# Patient Record
Sex: MALE | Race: WHITE | NOT HISPANIC OR LATINO | Employment: OTHER | URBAN - METROPOLITAN AREA
[De-identification: names, ages, dates, MRNs, and addresses within clinical notes are randomized per-mention and may not be internally consistent; named-entity substitution may affect disease eponyms.]

---

## 2017-04-26 ENCOUNTER — OFFICE VISIT (OUTPATIENT)
Dept: FAMILY MEDICINE | Facility: CLINIC | Age: 37
End: 2017-04-26
Payer: COMMERCIAL

## 2017-04-26 VITALS
WEIGHT: 172.7 LBS | OXYGEN SATURATION: 97 % | SYSTOLIC BLOOD PRESSURE: 130 MMHG | HEART RATE: 70 BPM | TEMPERATURE: 96.7 F | DIASTOLIC BLOOD PRESSURE: 90 MMHG | BODY MASS INDEX: 26.26 KG/M2

## 2017-04-26 DIAGNOSIS — J30.89 SEASONAL ALLERGIC RHINITIS DUE TO OTHER ALLERGIC TRIGGER: ICD-10-CM

## 2017-04-26 DIAGNOSIS — H92.03 OTALGIA OF BOTH EARS: Primary | ICD-10-CM

## 2017-04-26 DIAGNOSIS — H61.21 IMPACTED CERUMEN OF RIGHT EAR: ICD-10-CM

## 2017-04-26 DIAGNOSIS — R01.1 HEART MURMUR: ICD-10-CM

## 2017-04-26 PROCEDURE — 99213 OFFICE O/P EST LOW 20 MIN: CPT | Mod: 25 | Performed by: NURSE PRACTITIONER

## 2017-04-26 PROCEDURE — 69209 REMOVE IMPACTED EAR WAX UNI: CPT | Performed by: NURSE PRACTITIONER

## 2017-04-26 RX ORDER — OXYMETAZOLINE HYDROCHLORIDE 0.05 G/100ML
2-3 SPRAY NASAL 2 TIMES DAILY PRN
Qty: 1 BOTTLE | Refills: 0 | Status: SHIPPED | OUTPATIENT
Start: 2017-04-26 | End: 2017-09-27

## 2017-04-26 RX ORDER — FLUTICASONE PROPIONATE 50 MCG
1-2 SPRAY, SUSPENSION (ML) NASAL DAILY
Qty: 1 BOTTLE | Refills: 11 | Status: SHIPPED | OUTPATIENT
Start: 2017-04-26 | End: 2017-09-27

## 2017-04-26 NOTE — PROGRESS NOTES
SUBJECTIVE:                                                    Mehran Ibarra is a 37 year old male who presents to clinic today for the following health issues:    Acute Illness   Acute illness concerns: ear infection  Onset: 1 day ago    Fever: no     Chills/Sweats: no     Headache (location?): no     Sinus Pressure: No    Conjunctivitis:  no    Ear Pain: YES: both    Rhinorrhea: YES- a little    Congestion: no     Sore Throat: YES     Cough: YES - a little but not as bad    Wheeze: no     Decreased Appetite: no     Nausea: no     Vomiting: no     Diarrhea:  no     Dysuria/Freq.: no     Fatigue/Achiness: no     Sick/Strep Exposure: no      Therapies Tried and outcome: None    BP Readings from Last 6 Encounters:   04/26/17 130/90   12/15/16 132/80   07/26/16 126/78   08/21/15 146/86   01/31/14 120/80   11/27/13 125/78       Questions/Concerns: None      Problem list and histories reviewed & adjusted, as indicated.  Additional history: as documented    Patient Active Problem List   Diagnosis     GERD (gastroesophageal reflux disease)     IBS (irritable bowel syndrome)     Pectus excavatum     ADHD (attention deficit hyperactivity disorder)     Generalized anxiety disorder     CARDIOVASCULAR SCREENING; LDL GOAL LESS THAN 160     Global developmental delay     Epigastric pain     Seborrheic dermatitis     Localized bacterial skin infection     Athlete's foot on right     Atypical nevus of back     Elevated blood pressure reading without diagnosis of hypertension     Bunion of great toe of left foot     Past Surgical History:   Procedure Laterality Date     HERNIA REPAIR, INGUINAL RT/LT      Hernia Repair, RT/LT     ingrown toenail       THORACOSCOPIC REPAIR PECTUS EXCAVATUM CHILD ([place])       tooth removal         Social History   Substance Use Topics     Smoking status: Current Some Day Smoker     Packs/day: 0.02     Years: 10.00     Types: Cigarettes, Cigars     Last attempt to quit: 2/28/2013     Smokeless  tobacco: Never Used     Alcohol use No     Family History   Problem Relation Age of Onset     Hypertension Father      Cardiovascular Mother      HEART DISEASE Maternal Grandmother      DIABETES Paternal Grandfather            Reviewed and updated as needed this visit by clinical staff       Reviewed and updated as needed this visit by Provider         ROS:  ROS:5 point ROS including CONST, HEENT, Respiratory, CV, and GI other than that noted in the HPI,  is negative       OBJECTIVE:                                                    /90 (BP Location: Right arm, Patient Position: Chair, Cuff Size: Adult Regular)  Pulse 70  Temp 96.7  F (35.9  C) (Oral)  Wt 172 lb 11.2 oz (78.3 kg)  SpO2 97%  BMI 26.26 kg/m2  Body mass index is 26.26 kg/(m^2).  GENERAL: healthy, alert and no distress  EYES: Eyes grossly normal to inspection, PERRL and conjunctivae and sclerae normal  HENT: normal cephalic/atraumatic, both ears: clear effusion and left partially occluded with wax, nose and mouth without ulcers or lesions, nasal erythematous and edematous, oropharynx clear and oral mucous membranes moist  NECK: no adenopathy, no asymmetry, masses, or scars and thyroid normal to palpation  RESP: lungs clear to auscultation - no rales, rhonchi or wheezes  CV: regular rate and rhythm, normal S1 S2, no S3 or S4, no murmur, click or rub, no peripheral edema and peripheral pulses strong; grade 2/6 systolic murmur     Diagnostic Test Results:  pending     ASSESSMENT/PLAN:                                                      (H92.03) Otalgia of both ears  (primary encounter diagnosis)  Comment:   Plan: fluticasone (FLONASE) 50 MCG/ACT spray            (H61.21) Impacted cerumen of right ear  Comment:   Plan: REMOVE IMPACTED CERUMEN            (J30.89) Seasonal allergic rhinitis due to other allergic trigger  Comment:   Plan: oxymetazoline (AFRIN NASAL SPRAY) 0.05 % spray,        fluticasone (FLONASE) 50 MCG/ACT spray             (R01.1) Heart murmur  Comment:   Plan: Echocardiogram Complete        Newly noted murmur      Patient Instructions   Start afrin and flonase nasal sprays.  Stop the afrin after 3 days.    Schedule and ECHO by calling [phone]        Diagnosing Heart Valve Problems  Your doctor will ask you questions about your family and medical history. Certain tests may be done. These help diagnose your valve problem and rule out any other disease you may have.    Listening to Your Heart  A problem with a heart valve will usually cause the heart to make a noise. Your doctor can hear this noise, called a murmur. But you can have a heart murmur and not have valve disease or any other heart problem. Other tests can help confirm the diagnosis of valve disease.  Looking at Your Heart  A transthoracic, or surface, echocardiogram (echo) is a simple, painless test that bounces harmless sound waves off the heart. These sound waves become images on a video screen. Your doctor can then see a moving picture of your heart. This test shows how the valves work. It can confirm whether a valve is narrowed or leaking. It can also show the size of the chambers and whether your heart muscle pumps normally. A special type of echo, called a transesophageal echo (MIKHAIL), may be done as well. This test can provide even more detailed information about your heart valves. But a MIKHAIL is somewhat more involved than a surface echo, so a surface echo is usually the first test done. Echo testing can help your doctor monitor changes in your heart over time.  Other Tests  Your doctor may order a chest X-ray for another look at your heart and lungs. You may have an electrocardiogram, a test that shows the rhythm of the heartbeat. You may have cardiac catheterization, a test to look inside the heart. This test helps measure the pressure in the chambers, checks for leaky valves, and looks for problems in the heart s arteries.    8405-5538 [employer] [address] [place] [address]. All rights reserved. This information is not intended as a substitute for professional medical care. Always follow your healthcare professional's instructions.            [facility]

## 2017-04-26 NOTE — PROGRESS NOTES
Mehran Ibarra is a 37 year old male who presents in clinic with complaint of impacted ear wax (cerumen).  Per the order of Christina Balderas, ear wax was removed from right side by flushing with warm water and Hydrogen peroxide solution and manual debridement has been performed. Patient denies pain/dizziness/discharge/drainage  (if yes, stop procedure and huddle with provider).  Ear wax has been successfully removed. (If not, huddle with provider).   Alyson Helton

## 2017-04-26 NOTE — NURSING NOTE
"Chief Complaint   Patient presents with     Otitis Media       Initial /90 (BP Location: Right arm, Patient Position: Chair, Cuff Size: Adult Regular)  Pulse 70  Temp 96.7  F (35.9  C) (Oral)  Wt 172 lb 11.2 oz (78.3 kg)  SpO2 97%  BMI 26.26 kg/m2 Estimated body mass index is 26.26 kg/(m^2) as calculated from the following:    Height as of 7/26/16: 5' 8\" (1.727 m).    Weight as of this encounter: 172 lb 11.2 oz (78.3 kg).  Medication Reconciliation: complete     Saravanan Kyle MA      "

## 2017-04-26 NOTE — MR AVS SNAPSHOT
After Visit Summary   4/26/2017    Mehran Ibarra    MRN: [id number]           Patient Information     Date Of Birth          1980        Visit Information        Provider Department      4/26/2017 2:15 PM Christina Balderas APRN [facility]        Today's Diagnoses     Otalgia of both ears    -  1    Impacted cerumen of right ear        Seasonal allergic rhinitis due to other allergic trigger        Heart murmur          Care Instructions    Start afrin and flonase nasal sprays.  Stop the afrin after 3 days.    Schedule and ECHO by calling [phone]        Diagnosing Heart Valve Problems  Your doctor will ask you questions about your family and medical history. Certain tests may be done. These help diagnose your valve problem and rule out any other disease you may have.    Listening to Your Heart  A problem with a heart valve will usually cause the heart to make a noise. Your doctor can hear this noise, called a murmur. But you can have a heart murmur and not have valve disease or any other heart problem. Other tests can help confirm the diagnosis of valve disease.  Looking at Your Heart  A transthoracic, or surface, echocardiogram (echo) is a simple, painless test that bounces harmless sound waves off the heart. These sound waves become images on a video screen. Your doctor can then see a moving picture of your heart. This test shows how the valves work. It can confirm whether a valve is narrowed or leaking. It can also show the size of the chambers and whether your heart muscle pumps normally. A special type of echo, called a transesophageal echo (MIKHAIL), may be done as well. This test can provide even more detailed information about your heart valves. But a MIKHAIL is somewhat more involved than a surface echo, so a surface echo is usually the first test done. Echo testing can help your doctor monitor changes in your heart over time.  Other Tests  Your doctor may order a chest  "X-ray for another look at your heart and lungs. You may have an electrocardiogram, a test that shows the rhythm of the heartbeat. You may have cardiac catheterization, a test to look inside the heart. This test helps measure the pressure in the chambers, checks for leaky valves, and looks for problems in the heart s arteries.    3145-8323 [employer]. [address] [place] [address]. All rights reserved. This information is not intended as a substitute for professional medical care. Always follow your healthcare professional's instructions.              Follow-ups after your visit        Future tests that were ordered for you today     Open Future Orders        Priority Expected Expires Ordered    Echocardiogram Complete Routine  4/26/2018 4/26/2017            Who to contact     If you have questions or need follow up information about today's clinic visit or your schedule please contact [facility] directly at [phone].  Normal or non-critical lab and imaging results will be communicated to you by MyChart, letter or phone within 4 business days after the clinic has received the results. If you do not hear from us within 7 days, please contact the clinic through [employer] or phone. If you have a critical or abnormal lab result, we will notify you by phone as soon as possible.  Submit refill requests through [employer] or call your pharmacy and they will forward the refill request to us. Please allow 3 business days for your refill to be completed.          Additional Information About Your Visit        [employer] Information     [employer] lets you send messages to your doctor, view your test results, renew your prescriptions, schedule appointments and more. To sign up, go to [email or web address] . Click on \"Log in\" on the left side of the screen, which will take you to the Welcome page. Then click on \"Sign up Now\" on the right side of the page.     You will be asked to enter the " access code listed below, as well as some personal information. Please follow the directions to create your username and password.     Your access code is: [email or web address]  Expires: 2017  3:05 PM     Your access code will  in 90 days. If you need help or a new code, please call your [place] clinic or [phone].        Care EveryWhere ID     This is your Care EveryWhere ID. This could be used by other organizations to access your [place] medical records  [id number]        Your Vitals Were     Pulse Temperature Pulse Oximetry BMI (Body Mass Index)          70 96.7  F (35.9  C) (Oral) 97% 26.26 kg/m2         Blood Pressure from Last 3 Encounters:   17 130/90   12/15/16 132/80   16 126/78    Weight from Last 3 Encounters:   17 172 lb 11.2 oz (78.3 kg)   12/15/16 170 lb 1.6 oz (77.2 kg)   16 170 lb 11.2 oz (77.4 kg)              We Performed the Following     REMOVE IMPACTED CERUMEN          Today's Medication Changes          These changes are accurate as of: 17  3:05 PM.  If you have any questions, ask your nurse or doctor.               Start taking these medicines.        Dose/Directions    fluticasone 50 MCG/ACT spray   Commonly known as:  FLONASE   Used for:  Seasonal allergic rhinitis due to other allergic trigger, Otalgia of both ears   Started by:  Christina Balderas APRN CNP        Dose:  1-2 spray   Spray 1-2 sprays into both nostrils daily   Quantity:  1 Bottle   Refills:  11       oxymetazoline 0.05 % spray   Commonly known as:  AFRIN NASAL SPRAY   Used for:  Seasonal allergic rhinitis due to other allergic trigger   Started by:  Christina Balderas APRN CNP        Dose:  2-3 spray   Spray 2-3 sprays into both nostrils 2 times daily as needed for congestion FOR THREE DAYS ONLY   Quantity:  1 Bottle   Refills:  0            Where to get your medicines      These medications were sent to [id number] - [place], [place] - [address], [place] [address]     Phone:  [phone]     fluticasone 50 MCG/ACT spray    oxymetazoline 0.05 % spray                Primary Care Provider Office Phone # Fax #    Arpan Flores [phone]       [facility] [address]  [facility] [address]        Thank you!     Thank you for choosing [facility]  for your care. Our goal is always to provide you with excellent care. Hearing back from our patients is one way we can continue to improve our services. Please take a few minutes to complete the written survey that you may receive in the mail after your visit with us. Thank you!             Your Updated Medication List - Protect others around you: Learn how to safely use, store and throw away your medicines at www.disposemymeds.org.          This list is accurate as of: 4/26/17  3:05 PM.  Always use your most recent med list.                   Brand Name Dispense Instructions for use    amoxicillin 500 MG capsule    AMOXIL    30 capsule    Take 1 capsule (500 mg) by mouth 3 times daily       * amphetamine-dextroamphetamine 5 MG per tablet    ADDERALL    5 tablet    Take 1 tablet (5 mg) by mouth daily       * amphetamine-dextroamphetamine 5 MG per 24 hr capsule    ADDERALL XR         citalopram 10 MG tablet    celeXA    5 tablet    Take 1 tablet (10 mg) by mouth daily       fluticasone 50 MCG/ACT spray    FLONASE    1 Bottle    Spray 1-2 sprays into both nostrils daily       FLUVIRIN Inj   Generic drug:  Influenza Vac Typ A&B Surf Ant          guanFACINE 1 MG tablet    TENEX    10 tablet    Take 2 tablets (2 mg) by mouth At Bedtime       ketoconazole 2 % cream    NIZORAL    15 g    Apply topically 2 times daily       oxymetazoline 0.05 % spray    AFRIN NASAL SPRAY    1 Bottle    Spray 2-3 sprays into both nostrils 2 times daily as needed for congestion FOR THREE DAYS ONLY       * Notice:  This list has 2 medication(s) that are  the same as other medications prescribed for you. Read the directions carefully, and ask your doctor or other care provider to review them with you.

## 2017-04-26 NOTE — PATIENT INSTRUCTIONS
Start afrin and flonase nasal sprays.  Stop the afrin after 3 days.    Schedule and ECHO by calling [phone]        Diagnosing Heart Valve Problems  Your doctor will ask you questions about your family and medical history. Certain tests may be done. These help diagnose your valve problem and rule out any other disease you may have.    Listening to Your Heart  A problem with a heart valve will usually cause the heart to make a noise. Your doctor can hear this noise, called a murmur. But you can have a heart murmur and not have valve disease or any other heart problem. Other tests can help confirm the diagnosis of valve disease.  Looking at Your Heart  A transthoracic, or surface, echocardiogram (echo) is a simple, painless test that bounces harmless sound waves off the heart. These sound waves become images on a video screen. Your doctor can then see a moving picture of your heart. This test shows how the valves work. It can confirm whether a valve is narrowed or leaking. It can also show the size of the chambers and whether your heart muscle pumps normally. A special type of echo, called a transesophageal echo (MIKHAIL), may be done as well. This test can provide even more detailed information about your heart valves. But a MIKHAIL is somewhat more involved than a surface echo, so a surface echo is usually the first test done. Echo testing can help your doctor monitor changes in your heart over time.  Other Tests  Your doctor may order a chest X-ray for another look at your heart and lungs. You may have an electrocardiogram, a test that shows the rhythm of the heartbeat. You may have cardiac catheterization, a test to look inside the heart. This test helps measure the pressure in the chambers, checks for leaky valves, and looks for problems in the heart s arteries.    0400-0120 [employer]. [address] [place] [address], [place] [address]. All rights reserved. This information is not intended as a substitute for  professional medical care. Always follow your healthcare professional's instructions.

## 2017-09-11 ENCOUNTER — OFFICE VISIT (OUTPATIENT)
Dept: FAMILY MEDICINE | Facility: CLINIC | Age: 37
End: 2017-09-11
Payer: COMMERCIAL

## 2017-09-11 VITALS
TEMPERATURE: 98.5 F | OXYGEN SATURATION: 94 % | SYSTOLIC BLOOD PRESSURE: 122 MMHG | DIASTOLIC BLOOD PRESSURE: 80 MMHG | HEART RATE: 85 BPM | WEIGHT: 170.5 LBS | BODY MASS INDEX: 25.92 KG/M2

## 2017-09-11 DIAGNOSIS — R01.1 UNDIAGNOSED CARDIAC MURMURS: ICD-10-CM

## 2017-09-11 DIAGNOSIS — L72.0 EPITHELIAL CYST: ICD-10-CM

## 2017-09-11 DIAGNOSIS — D22.9 ATYPICAL MOLE: ICD-10-CM

## 2017-09-11 DIAGNOSIS — Z00.00 ROUTINE GENERAL MEDICAL EXAMINATION AT A HEALTH CARE FACILITY: Primary | ICD-10-CM

## 2017-09-11 PROBLEM — F17.200 CURRENT SMOKER: Status: ACTIVE | Noted: 2017-09-11

## 2017-09-11 PROBLEM — F17.200 CURRENT SMOKER: Status: RESOLVED | Noted: 2017-09-11 | Resolved: 2017-09-11

## 2017-09-11 PROCEDURE — 99395 PREV VISIT EST AGE 18-39: CPT | Performed by: FAMILY MEDICINE

## 2017-09-11 NOTE — PATIENT INSTRUCTIONS
-You may call radiology at [phone] to try and schedule a non-stress or non-exercise echocardiogram.    Preventive Health Recommendations  Male Ages 26 - 39    Yearly exam:             See your health care provider every year in order to  o   Review health changes.   o   Discuss preventive care.    o   Review your medicines if your doctor has prescribed any.    You should be tested each year for STDs (sexually transmitted diseases), if you re at risk.     After age 35, talk to your provider about cholesterol testing. If you are at risk for heart disease, have your cholesterol tested at least every 5 years.     If you are at risk for diabetes, you should have a diabetes test (fasting glucose).  Shots: Get a flu shot each year. Get a tetanus shot every 10 years.     Nutrition:    Eat at least 5 servings of fruits and vegetables daily.     Eat whole-grain bread, whole-wheat pasta and brown rice instead of white grains and rice.     Talk to your provider about Calcium and Vitamin D.     Lifestyle    Exercise for at least 150 minutes a week (30 minutes a day, 5 days a week). This will help you control your weight and prevent disease.     Limit alcohol to one drink per day.     No smoking.     Wear sunscreen to prevent skin cancer.     See your dentist every six months for an exam and cleaning.

## 2017-09-11 NOTE — MR AVS SNAPSHOT
After Visit Summary   9/11/2017    Mehran Ibarra    MRN: [id number]           Patient Information     Date Of Birth          1980        Visit Information        Provider Department      9/11/2017 11:00 AM Ar[place] Flores [place] [facility] [place] [facility] [place]'s Diagnoses     Routine general medical examination at a health care facility    -  1    Atypical mole        Epithelial cyst        Undiagnosed cardiac murmurs          Care Instructions    -You may call radiology at [phone] to try and schedule a non-stress or non-exercise echocardiogram.    Preventive Health Recommendations  Male Ages 26 - 39    Yearly exam:             See your health care provider every year in order to  o   Review health changes.   o   Discuss preventive care.    o   Review your medicines if your doctor has prescribed any.    You should be tested each year for STDs (sexually transmitted diseases), if you re at risk.     After age 35, talk to your provider about cholesterol testing. If you are at risk for heart disease, have your cholesterol tested at least every 5 years.     If you are at risk for diabetes, you should have a diabetes test (fasting glucose).  Shots: Get a flu shot each year. Get a tetanus shot every 10 years.     Nutrition:    Eat at least 5 servings of fruits and vegetables daily.     Eat whole-grain bread, whole-wheat pasta and brown rice instead of white grains and rice.     Talk to your provider about Calcium and Vitamin D.     Lifestyle    Exercise for at least 150 minutes a week (30 minutes a day, 5 days a week). This will help you control your weight and prevent disease.     Limit alcohol to one drink per day.     No smoking.     Wear sunscreen to prevent skin cancer.     See your dentist every six months for an exam and cleaning.             Follow-ups after your visit        Future tests that were ordered for you today     Open Future Orders        Priority Expected Expires  "Ordered    Echocardiogram Complete Routine  2018            Who to contact     If you have questions or need follow up information about today's clinic visit or your schedule please contact [facility] [place] directly at [phone].  Normal or non-critical lab and imaging results will be communicated to you by [facility], letter or phone within 4 business days after the clinic has received the results. If you do not hear from us within 7 days, please contact the clinic through [facility] or phone. If you have a critical or abnormal lab result, we will notify you by phone as soon as possible.  Submit refill requests through [facility] or call your pharmacy and they will forward the refill request to us. Please allow 3 business days for your refill to be completed.          Additional Information About Your Visit        [facility] Information     [facility] lets you send messages to your doctor, view your test results, renew your prescriptions, schedule appointments and more. To sign up, go to [email or web address] . Click on \"Log in\" on the left side of the screen, which will take you to the Welcome page. Then click on \"Sign up Now\" on the right side of the page.     You will be asked to enter the access code listed below, as well as some personal information. Please follow the directions to create your username and password.     Your access code is: [email or web address]  Expires: 12/10/2017 11:43 AM     Your access code will  in 90 days. If you need help or a new code, please call your [facility] or [phone].        Care EveryWhere ID     This is your Care EveryWhere ID. This could be used by other organizations to access your [place] medical records  [id number]        Your Vitals Were     Pulse Temperature Pulse Oximetry BMI (Body Mass Index)          85 98.5  F (36.9  C) (Oral) 94% 25.92 kg/m2         Blood Pressure from Last 3 Encounters:   17 122/80   17 130/90   12/15/16 132/80 "    Weight from Last 3 Encounters:   09/11/17 170 lb 8 oz (77.3 kg)   04/26/17 172 lb 11.2 oz (78.3 kg)   12/15/16 170 lb 1.6 oz (77.2 kg)               Primary Care Provider Office Phone # Fax #    Arpan Flores [phone]       [address] [address]        Equal Access to Services     RAYA VASQUEZ : Hadii aad ku hadasho Soomaali, waaxda luqadaha, qaybta kaalmada adeegyada, waxay idiin hayaan adeeg kharash la'aan . So [facility] [phone].    ATENCIÓN: Si habla español, tiene a english disposición servicios gratuitos de asistencia lingüística. [facility] [phone].    We comply with applicable federal civil rights laws and [place] laws. We do not discriminate on the basis of race, color, national origin, age, disability sex, sexual orientation or gender identity.            Thank you!     Thank you for choosing [facility] [place]  for your care. Our goal is always to provide you with excellent care. Hearing back from our patients is one way we can continue to improve our services. Please take a few minutes to complete the written survey that you may receive in the mail after your visit with us. Thank you!             Your Updated Medication List - Protect others around you: Learn how to safely use, store and throw away your medicines at www.disposemymeds.org.          This list is accurate as of: 9/11/17 11:43 AM.  Always use your most recent med list.                   Brand Name Dispense Instructions for use Diagnosis    amoxicillin 500 MG capsule    AMOXIL    30 capsule    Take 1 capsule (500 mg) by mouth 3 times daily    Suppurative otitis media of right ear, unspecified chronicity       * amphetamine-dextroamphetamine 5 MG per tablet    ADDERALL    5 tablet    Take 1 tablet (5 mg) by mouth daily    ADHD (attention deficit hyperactivity disorder), Generalised anxiety disorder       * amphetamine-dextroamphetamine 5 MG per 24 hr capsule    ADDERALL XR           citalopram 10 MG tablet    celeXA    5 tablet    Take 1 tablet (10 mg) by mouth daily    Generalised anxiety disorder       fluticasone 50 MCG/ACT spray    FLONASE    1 Bottle    Spray 1-2 sprays into both nostrils daily    Seasonal allergic rhinitis due to other allergic trigger, Otalgia of both ears       FLUVIRIN Inj   Generic drug:  Influenza Vac Typ A&B Surf Ant           guanFACINE 1 MG tablet    TENEX    10 tablet    Take 2 tablets (2 mg) by mouth At Bedtime    Generalised anxiety disorder       ketoconazole 2 % cream    NIZORAL    15 g    Apply topically 2 times daily    Athlete's foot on right       oxymetazoline 0.05 % spray    AFRIN NASAL SPRAY    1 Bottle    Spray 2-3 sprays into both nostrils 2 times daily as needed for congestion FOR THREE DAYS ONLY    Seasonal allergic rhinitis due to other allergic trigger       * Notice:  This list has 2 medication(s) that are the same as other medications prescribed for you. Read the directions carefully, and ask your doctor or other care provider to review them with you.

## 2017-09-11 NOTE — NURSING NOTE
"Chief Complaint   Patient presents with     Physical       Initial /80  Pulse 85  Temp 98.5  F (36.9  C) (Oral)  Wt 170 lb 8 oz (77.3 kg)  SpO2 94%  BMI 25.92 kg/m2 Estimated body mass index is 25.92 kg/(m^2) as calculated from the following:    Height as of 7/26/16: 5' 8\" (1.727 m).    Weight as of this encounter: 170 lb 8 oz (77.3 kg).  Medication Reconciliation: complete     Emilee Fernandez MA      "

## 2017-09-11 NOTE — PROGRESS NOTES
SUBJECTIVE:   CC: Mehran Ibarra is an 37 year old male who presents for preventative health visit.     Patient has recently been seen by urgent care and it was recommended that he have an echocardiogram because he had an abnormal heart beat. His mother would also like to have some spots on his back and shoulders checked for cancer. He reports he quit smoking 6 months ago because he did not feel like smoking anymore.    Healthy Habits:    Do you get at least three servings of calcium containing foods daily (dairy, green leafy vegetables, etc.)? yes    Amount of exercise or daily activities, outside of work: Walks around 3 times a week, sometimes less or more    Problems taking medications regularly Yes sometimes forgetting to take    Medication side effects: No, but states if he doesn't eat anything he will get a stomach ache    Have you had an eye exam in the past two years? yes    Do you see a dentist twice per year? yes    Do you have sleep apnea, excessive snoring or daytime drowsiness?no      Today's PHQ-2 Score:   PHQ-2 ( 1999 Pf[employer]) 4/26/2017 12/15/2016   Q1: Little interest or pleasure in doing things 0 0   Q2: Feeling down, depressed or hopeless 0 0   PHQ-2 Score 0 0         Abuse: Current or Past(Physical, Sexual or Emotional)- No  Do you feel safe in your environment - Yes  Social History   Substance Use Topics     Smoking status: Current Some Day Smoker     Packs/day: 0.02     Years: 10.00     Types: Cigarettes, Cigars     Last attempt to quit: 2/28/2013     Smokeless tobacco: Never Used     Alcohol use No     The patient does not drink >3 drinks per day nor >7 drinks per week.    Last PSA: No results found for: PSA    Reviewed orders with patient. Reviewed health maintenance and updated orders accordingly - Yes  BP Readings from Last 3 Encounters:   09/11/17 122/80   04/26/17 130/90   12/15/16 132/80    Wt Readings from Last 3 Encounters:   09/11/17 77.3 kg (170 lb 8 oz)   04/26/17 78.3 kg (172 lb  11.2 oz)   12/15/16 77.2 kg (170 lb 1.6 oz)        Patient Active Problem List   Diagnosis     GERD (gastroesophageal reflux disease)     IBS (irritable bowel syndrome)     Pectus excavatum     ADHD (attention deficit hyperactivity disorder)     Generalized anxiety disorder     CARDIOVASCULAR SCREENING; LDL GOAL LESS THAN 160     Global developmental delay     Epigastric pain     Seborrheic dermatitis     Localized bacterial skin infection     Athlete's foot on right     Atypical nevus of back     Elevated blood pressure reading without diagnosis of hypertension     Bunion of great toe of left foot     Current smoker     Past Surgical History:   Procedure Laterality Date     HERNIA REPAIR, INGUINAL RT/LT      Hernia Repair, RT/LT     ingrown toenail       THORACOSCOPIC REPAIR PECTUS EXCAVATUM CHILD ([place])       tooth removal         Social History   Substance Use Topics     Smoking status: Current Some Day Smoker     Packs/day: 0.02     Years: 10.00     Types: Cigarettes, Cigars     Last attempt to quit: 2/28/2013     Smokeless tobacco: Never Used     Alcohol use No     Family History   Problem Relation Age of Onset     Hypertension Father      Cardiovascular Mother      HEART DISEASE Maternal Grandmother      DIABETES Paternal Grandfather          Current Outpatient Prescriptions   Medication Sig Dispense Refill     amphetamine-dextroamphetamine (ADDERALL XR) 5 MG per capsule   0     FLUVIRIN INJ   0     amphetamine-dextroamphetamine (ADDERALL) 5 MG tablet Take 1 tablet (5 mg) by mouth daily 5 tablet 0     guanFACINE (TENEX) 1 MG tablet Take 2 tablets (2 mg) by mouth At Bedtime 10 tablet 0     citalopram (CELEXA) 10 MG tablet Take 1 tablet (10 mg) by mouth daily 5 tablet 0     ketoconazole (NIZORAL) 2 % cream Apply topically 2 times daily 15 g 1     oxymetazoline (AFRIN NASAL SPRAY) 0.05 % spray Spray 2-3 sprays into both nostrils 2 times daily as needed for congestion FOR THREE DAYS ONLY (Patient not taking:  Reported on 9/11/2017) 1 Bottle 0     fluticasone (FLONASE) 50 MCG/ACT spray Spray 1-2 sprays into both nostrils daily (Patient not taking: Reported on 9/11/2017) 1 Bottle 11     amoxicillin (AMOXIL) 500 MG capsule Take 1 capsule (500 mg) by mouth 3 times daily (Patient not taking: Reported on 9/11/2017) 30 capsule 0     No Known Allergies        Reviewed and updated as needed this visit by clinical staff  Tobacco  Allergies  Meds  Med Hx  Surg Hx  Fam Hx  Soc Hx        Reviewed and updated as needed this visit by Provider              ROS:  Denies headache, insomnia, chest pain, shortness of breath, cough, heartburn, bowel issues, bladder issues, neck pain, back pain, hip pain, knee pain, ankle pain, or foot pain. Remainder of ROS is negative unless otherwise noted above or in HPI.    This document serves as a record of the services and decisions personally performed and made by Arpan Flores MD. It was created on his behalf by Naga Andrews, a trained medical scribe. The creation of this document is based the provider's statements to the medical scribe.  Naga Andrews 11:28 AM September 11, 2017    OBJECTIVE:   /80  Pulse 85  Temp 98.5  F (36.9  C) (Oral)  Wt 77.3 kg (170 lb 8 oz)  SpO2 94%  BMI 25.92 kg/m2  EXAM:  GENERAL: healthy, alert and no distress, developmental delay  EYES: right pupil larger than left, both pupils reactive to light and conjunctivae and sclerae normal. Immature cataracts bilateral. EOMI.  HENT: ear canals and TM's normal, nose and mouth without ulcers or lesions  NECK: no adenopathy, no asymmetry, masses, or scars and thyroid normal to palpation  RESP: lungs clear to auscultation - no rales, rhonchi or wheezes  CV: 2/6 soft systolic murmur, regular rate and rhythm, normal S1 S2, no S3 or S4, click or rub, no peripheral edema and peripheral pulses strong  ABDOMEN: soft, nontender, no hepatosplenomegaly, no masses and bowel sounds normal   (male): normal male  genitalia without lesions or urethral discharge, no hernia  MS: no gross musculoskeletal defects noted, no edema  SKIN: scattered moles across the shoulders, varying shades of tan and dark brown and some without pigmentation, some flat and some raised, most spots 6 mm in size, sebaceous cyst at base of neck 2 cm   NEURO: Normal strength and tone, mentation intact and speech normal, developmental delay. Knee reflexes normal.  PSYCH: mentation appears normal, affect normal/bright  LYMPH: no cervical, supraclavicular, axillary, or inguinal adenopathy    ASSESSMENT/PLAN:   (Z00.00) Routine general medical examination at a health care facility  (primary encounter diagnosis)  Comment: Routine physical completed today.  Plan: Follow up in 1 year for annual exam.    (D22.9) Atypical mole  Comment: As above in physical exam.   Plan: Will continue to monitor. Follow up as needed.    (L72.0) Epithelial cyst  Comment: As above in physical exam.   Plan: Will continue to monitor. Follow up as needed.    (R01.1) Undiagnosed cardiac murmurs  Comment: Order placed for echocardiogram.  Plan: Echocardiogram Complete        Follow through with echocardiogram.    Patient Instructions   -You may call radiology at [phone] to try and schedule a non-stress or non-exercise echocardiogram.    Preventive Health Recommendations  Male Ages 26 - 39    Yearly exam:             See your health care provider every year in order to  o   Review health changes.   o   Discuss preventive care.    o   Review your medicines if your doctor has prescribed any.    You should be tested each year for STDs (sexually transmitted diseases), if you re at risk.     After age 35, talk to your provider about cholesterol testing. If you are at risk for heart disease, have your cholesterol tested at least every 5 years.     If you are at risk for diabetes, you should have a diabetes test (fasting glucose).  Shots: Get a flu shot each year. Get a tetanus shot every  "10 years.     Nutrition:    Eat at least 5 servings of fruits and vegetables daily.     Eat whole-grain bread, whole-wheat pasta and brown rice instead of white grains and rice.     Talk to your provider about Calcium and Vitamin D.     Lifestyle    Exercise for at least 150 minutes a week (30 minutes a day, 5 days a week). This will help you control your weight and prevent disease.     Limit alcohol to one drink per day.     No smoking.     Wear sunscreen to prevent skin cancer.     See your dentist every six months for an exam and cleaning.         COUNSELING:  Reviewed preventive health counseling, as reflected in patient instructions     reports that he has been smoking Cigarettes and Cigars.  He has a 0.20 pack-year smoking history. He has never used smokeless tobacco.  Estimated body mass index is 25.92 kg/(m^2) as calculated from the following:    Height as of 7/26/16: 1.727 m (5' 8\").    Weight as of this encounter: 77.3 kg (170 lb 8 oz).   Weight management plan: Encouraged regular exercise and eating a healthy diet    The information in this document, created by the medical scribe for me, accurately reflects the services I personally performed and the decisions made by me. I have reviewed and approved this document for accuracy prior to leaving the patient care area.   Arpan Flores MD 11:27 AM September 11, 2017  [facility] [place]  "

## 2017-09-15 ENCOUNTER — TELEPHONE (OUTPATIENT)
Dept: FAMILY MEDICINE | Facility: CLINIC | Age: 37
End: 2017-09-15

## 2017-09-15 DIAGNOSIS — L72.0 EPITHELIAL CYST: ICD-10-CM

## 2017-09-15 DIAGNOSIS — D22.9 ATYPICAL MOLE: Primary | ICD-10-CM

## 2017-09-15 NOTE — TELEPHONE ENCOUNTER
Pt's mother/guardian is requesting to dsicuss the severity of pt's mole and cyst which she feels where not discussed at length during pt's last appointment and also if there is another option other than the echocardiogram as pt does not do well in stress inducing environments.    Kay can be reached @ [phone] flakito

## 2017-09-15 NOTE — TELEPHONE ENCOUNTER
Dr. Flores    See message below    Left VM for return call to clinic, did tell them that provider is not in clinic until next week.     Advise    Cher Saldivar RN   [facility]

## 2017-09-15 NOTE — TELEPHONE ENCOUNTER
Spoke with Kay    She can wait for response from Dr. Sandra Saldivar, RN   [facility]

## 2017-09-18 NOTE — TELEPHONE ENCOUNTER
Recommend Derm referral, suggest Dr Raven Price Primary care derm in [place]. Lower stress than busy clinics. Also recommend doing an EKG here in the clinic and cancel the Echo. If annual exam and EKG in 1 year don't indicate any changes, we can skip the echo.   Please notify, thanks Arpan

## 2017-09-19 ENCOUNTER — TELEPHONE (OUTPATIENT)
Dept: FAMILY MEDICINE | Facility: CLINIC | Age: 37
End: 2017-09-19

## 2017-09-19 ENCOUNTER — ALLIED HEALTH/NURSE VISIT (OUTPATIENT)
Dept: NURSING | Facility: CLINIC | Age: 37
End: 2017-09-19
Payer: COMMERCIAL

## 2017-09-19 DIAGNOSIS — R03.0 ELEVATED BLOOD PRESSURE READING WITHOUT DIAGNOSIS OF HYPERTENSION: Primary | ICD-10-CM

## 2017-09-19 DIAGNOSIS — R01.1 NEWLY RECOGNIZED MURMUR: Primary | ICD-10-CM

## 2017-09-19 DIAGNOSIS — R94.31 ABNORMAL ELECTROCARDIOGRAM: ICD-10-CM

## 2017-09-19 PROCEDURE — 93000 ELECTROCARDIOGRAM COMPLETE: CPT

## 2017-09-19 PROCEDURE — 99207 ZZC NO CHARGE NURSE ONLY: CPT

## 2017-09-19 NOTE — TELEPHONE ENCOUNTER
patient here for nurse only EKG   needs to see cards  Orders Placed This Encounter     CARDIOLOGY EVAL ADULT REFERRAL     Referral Type:   CV Cardio consult

## 2017-09-26 ENCOUNTER — HOSPITAL ENCOUNTER (OUTPATIENT)
Dept: CARDIOLOGY | Facility: CLINIC | Age: 37
Discharge: HOME | End: 2017-09-26
Attending: FAMILY MEDICINE | Admitting: FAMILY MEDICINE
Payer: COMMERCIAL

## 2017-09-26 DIAGNOSIS — R01.1 UNDIAGNOSED CARDIAC MURMURS: ICD-10-CM

## 2017-09-26 PROCEDURE — 40000264 ECHO COMPLETE WITH OPTISON

## 2017-09-26 PROCEDURE — 25500064 ZZH RX 255 OP 636: Performed by: FAMILY MEDICINE

## 2017-09-26 RX ADMIN — HUMAN ALBUMIN MICROSPHERES AND PERFLUTREN 3 ML: 10; .22 INJECTION, SOLUTION INTRAVENOUS at 14:15

## 2017-09-27 ENCOUNTER — OFFICE VISIT (OUTPATIENT)
Dept: CARDIOLOGY | Facility: CLINIC | Age: 37
End: 2017-09-27
Attending: FAMILY MEDICINE
Payer: COMMERCIAL

## 2017-09-27 VITALS
SYSTOLIC BLOOD PRESSURE: 122 MMHG | HEART RATE: 75 BPM | HEIGHT: 67 IN | DIASTOLIC BLOOD PRESSURE: 76 MMHG | BODY MASS INDEX: 27.06 KG/M2 | WEIGHT: 172.4 LBS

## 2017-09-27 DIAGNOSIS — I42.2 HYPERTROPHIC CARDIOMYOPATHY (H): Primary | ICD-10-CM

## 2017-09-27 PROCEDURE — 99203 OFFICE O/P NEW LOW 30 MIN: CPT | Performed by: INTERNAL MEDICINE

## 2017-09-27 NOTE — LETTER
9/27/2017    Ramírez Owens MD  [address]  [place] [address]    RE: Mehran ANTHONY Fred       Dear Colleague,    I had the pleasure of seeing Mehran Ibarra in the [facility].    It is a pleasure for me to see Mr. Ibarra today who is accompanied by his mother.  He is here for cardiac murmur which he certainly has.  He has a severe pectus excavatum and I suspect the murmur could partially be accounted for by the alteration in his chest wall.  However, an echocardiogram demonstrated a symmetrical septal hypertrophy.  Fortunately, there is no BRODIE nor is there left ventricular outflow tract obstruction.  Interestingly, his murmur does increase slightly with Valsalva maneuver.      He himself is free of chest pain, shortness of breath, PND, orthopnea.  This is a young gentleman with developmental delay as well as anxiety disorder.  He tells me he has no syncope, but does admit to having occasional dizzy spells which happened about once a month.  He will be transiently unsteady for a few seconds.  There are no other symptoms.      His mother is a clinic patient of ours.  She gave me permission to look up her charts and I see that she herself has a history of hypertrophic cardiomyopathy.  She tells me that her maternal grandfather had some sort of heart problem.  She has 2 brothers who have heart problems, but she does not know exactly what they are.  She herself has a defibrillator in place for ventricular tachycardia and she follows with my esteemed colleague, Dr. Elam.      Aside from the medical illnesses mentioned above, this young gentleman has no history of diabetes or hypertension.  She does not smoke, drink or do drugs.     Outpatient Encounter Prescriptions as of 9/27/2017   Medication Sig Dispense Refill     amphetamine-dextroamphetamine (ADDERALL) 5 MG tablet Take 1 tablet (5 mg) by mouth daily 5 tablet 0     guanFACINE (TENEX) 1 MG tablet Take 2  tablets (2 mg) by mouth At Bedtime 10 tablet 0     citalopram (CELEXA) 10 MG tablet Take 1 tablet (10 mg) by mouth daily 5 tablet 0     ketoconazole (NIZORAL) 2 % cream Apply topically 2 times daily 15 g 1     [DISCONTINUED] oxymetazoline (AFRIN NASAL SPRAY) 0.05 % spray Spray 2-3 sprays into both nostrils 2 times daily as needed for congestion FOR THREE DAYS ONLY (Patient not taking: Reported on 9/11/2017) 1 Bottle 0     [DISCONTINUED] fluticasone (FLONASE) 50 MCG/ACT spray Spray 1-2 sprays into both nostrils daily (Patient not taking: Reported on 9/11/2017) 1 Bottle 11     [DISCONTINUED] amoxicillin (AMOXIL) 500 MG capsule Take 1 capsule (500 mg) by mouth 3 times daily (Patient not taking: Reported on 9/11/2017) 30 capsule 0     [DISCONTINUED] amphetamine-dextroamphetamine (ADDERALL XR) 5 MG per capsule   0     [DISCONTINUED] FLUVIRIN INJ   0     No facility-administered encounter medications on file as of 9/27/2017.       ASSESSMENT:  This gentleman has newly diagnosed hypertrophic cardiomyopathy, but he has no symptoms nor does he have significant gradient.  As such, I do not think he would benefit from any medical therapy at this time.  I am not sure what his monthly transient dizzy spells are from, but I would like to monitor his heart rhythm on a prolonged basis.  Hopefully, we will not find any significant arrhythmias.  I also mentioned a cardiac MR at some stage to evaluate for myocardial scarring.  I look forward to seeing him again in a couple of months' time.     Again, thank you for allowing me to participate in the care of your patient.      Sincerely,    DR EVETTE MILTON MD     [facility]    cc:   Arpan Flores MD  [address]  [facility] [address]

## 2017-09-27 NOTE — MR AVS SNAPSHOT
After Visit Summary   9/27/2017    Mehran Ibarra    MRN: [id number]           Patient Information     Date Of Birth          1980        Visit Information        Provider Department      9/27/2017 12:45 PM Ip, Avni Hendrix MD [facility]        Today's Diagnoses     Hypertrophic cardiomyopathy (H)    -  1       Follow-ups after your visit        Your next 10 appointments already scheduled     Oct 04, 2017  8:00 AM CDT   Office Visit with Sheila Carreno MD   [facility] ([facility] )    [address] [place] [id number] [phone]           Bring a current list of meds and any records pertaining to this visit. For Physicals, please bring immunization records and any forms needing to be filled out. Please arrive 10 minutes early to complete paperwork.              Future tests that were ordered for you today     Open Future Orders        Priority Expected Expires Ordered    Cardiac Event Monitor - Peds/Adult Routine 10/4/2017 9/27/2018 9/27/2017    ECHO COMPLETE WITH OPTISON Routine  9/11/2018 9/11/2017            Who to contact     If you have questions or need follow up information about today's clinic visit or your schedule please contact [facility] directly at [phone].  Normal or non-critical lab and imaging results will be communicated to you by MyChart, letter or phone within 4 business days after the clinic has received the results. If you do not hear from us within 7 days, please contact the clinic through MyChart or phone. If you have a critical or abnormal lab result, we will notify you by phone as soon as possible.  Submit refill requests through [employer] or call your pharmacy and they will forward the refill request to us. Please allow 3 business days for your refill to be completed.          Additional  "Information About Your Visit        MyChart Information     [facility] lets you send messages to your doctor, view your test results, renew your prescriptions, schedule appointments and more. To sign up, go to [email or web address] . Click on \"Log in\" on the left side of the screen, which will take you to the Welcome page. Then click on \"Sign up Now\" on the right side of the page.     You will be asked to enter the access code listed below, as well as some personal information. Please follow the directions to create your username and password.     Your access code is: [email or web address]  Expires: 12/10/2017 11:43 AM     Your access code will  in 90 days. If you need help or a new code, please call your [facility] clinic or [phone].        Care EveryWhere ID     This is your Care EveryWhere ID. This could be used by other organizations to access your [facility] medical records  [id number]        Your Vitals Were     Pulse Height BMI (Body Mass Index)             75 1.708 m (5' 7.25\") 26.8 kg/m2          Blood Pressure from Last 3 Encounters:   17 122/76   17 122/80   17 130/90    Weight from Last 3 Encounters:   17 78.2 kg (172 lb 6.4 oz)   17 77.3 kg (170 lb 8 oz)   17 78.3 kg (172 lb 11.2 oz)               Primary Care Provider Office Phone # Fax #    Arpan Flores [phone]       [address] [address]        Equal Access to Services     [facility] : Hadii lilian Xiong, mell [place], nichole iraheta . So [facility] [phone].    ATENCIÓN: Si habla español, tiene a english disposición servicios gratuitos de asistencia lingüística. Llame al [phone].    We comply with applicable federal civil rights laws and [place] laws. We do not discriminate on the basis of race, color, national origin, age, disability sex, sexual orientation or gender identity.          "   Thank you!     Thank you for choosing [facility] [place]  for your care. Our goal is always to provide you with excellent care. Hearing back from our patients is one way we can continue to improve our services. Please take a few minutes to complete the written survey that you may receive in the mail after your visit with us. Thank you!             Your Updated Medication List - Protect others around you: Learn how to safely use, store and throw away your medicines at www.disposemymeds.org.          This list is accurate as of: 9/27/17  1:18 PM.  Always use your most recent med list.                   Brand Name Dispense Instructions for use Diagnosis    amphetamine-dextroamphetamine 5 MG per tablet    ADDERALL    5 tablet    Take 1 tablet (5 mg) by mouth daily    ADHD (attention deficit hyperactivity disorder), Generalised anxiety disorder       citalopram 10 MG tablet    celeXA    5 tablet    Take 1 tablet (10 mg) by mouth daily    Generalised anxiety disorder       FLUVIRIN Inj   Generic drug:  Influenza Vac Typ A&B Surf Ant           guanFACINE 1 MG tablet    TENEX    10 tablet    Take 2 tablets (2 mg) by mouth At Bedtime    Generalised anxiety disorder       ketoconazole 2 % cream    NIZORAL    15 g    Apply topically 2 times daily    Athlete's foot on right

## 2017-09-27 NOTE — PROGRESS NOTES
HISTORY OF PRESENT ILLNESS:  It is a pleasure for me to see Mr. Ibarra today who is accompanied by his mother.  He is here for cardiac murmur which he certainly has.  He has a severe pectus excavatum and I suspect the murmur could partially be accounted for by the alteration in his chest wall.  However, an echocardiogram demonstrated a symmetrical septal hypertrophy.  Fortunately, there is no BRODIE nor is there left ventricular outflow tract obstruction.  Interestingly, his murmur does increase slightly with Valsalva maneuver.      He himself is free of chest pain, shortness of breath, PND, orthopnea.  This is a young gentleman with developmental delay as well as anxiety disorder.  He tells me he has no syncope, but does admit to having occasional dizzy spells which happened about once a month.  He will be transiently unsteady for a few seconds.  There are no other symptoms.      His mother is a clinic patient of ours.  She gave me permission to look up her charts and I see that she herself has a history of hypertrophic cardiomyopathy.  She tells me that her maternal grandfather had some sort of heart problem.  She has 2 brothers who have heart problems, but she does not know exactly what they are.  She herself has a defibrillator in place for ventricular tachycardia and she follows with my esteemed colleague, Dr. Elam.      Aside from the medical illnesses mentioned above, this young gentleman has no history of diabetes or hypertension.  She does not smoke, drink or do drugs.      ASSESSMENT:  This gentleman has newly diagnosed hypertrophic cardiomyopathy, but he has no symptoms nor does he have significant gradient.  As such, I do not think he would benefit from any medical therapy at this time.  I am not sure what his monthly transient dizzy spells are from, but I would like to monitor his heart rhythm on a prolonged basis.  Hopefully, we will not find any significant arrhythmias.  I also mentioned a cardiac MR  at some stage to evaluate for myocardial scarring.  I look forward to seeing him again in a couple of months' time.         EVETTE MILTON MD, FACC             D: 2017 13:41   T: 2017 15:31   MT: AMI      Name:     DENVER NICOLE   MRN:      [id number]        Account:      [id number]   :      1980           Service Date: 2017      Document: [id number]

## 2017-09-27 NOTE — PROGRESS NOTES
HPI and Plan:   See dictation    Orders Placed This Encounter   Procedures     Follow-Up with Cardiologist     Cardiac Event Monitor - Peds/Adult       No orders of the defined types were placed in this encounter.      Encounter Diagnosis   Name Primary?     Hypertrophic cardiomyopathy (H) Yes       CURRENT MEDICATIONS:  Current Outpatient Prescriptions   Medication Sig Dispense Refill     amphetamine-dextroamphetamine (ADDERALL) 5 MG tablet Take 1 tablet (5 mg) by mouth daily 5 tablet 0     guanFACINE (TENEX) 1 MG tablet Take 2 tablets (2 mg) by mouth At Bedtime 10 tablet 0     citalopram (CELEXA) 10 MG tablet Take 1 tablet (10 mg) by mouth daily 5 tablet 0     ketoconazole (NIZORAL) 2 % cream Apply topically 2 times daily 15 g 1     FLUVIRIN INJ   0       ALLERGIES   No Known Allergies    PAST MEDICAL HISTORY:  Past Medical History:   Diagnosis Date     ADHD (attention deficit hyperactivity disorder)      CARDIOVASCULAR SCREENING; LDL GOAL LESS THAN 160 6/9/2012     Elevated blood pressure reading without diagnosis of hypertension 8/21/2015     Generalized anxiety disorder 6/8/2012     Diagnosis updated by automated process. Provider to review and confirm.     GERD (gastroesophageal reflux disease) 3/8/2011     Global developmental delay      IBS (irritable bowel syndrome) 3/8/2011    With constipation      Pectus excavatum 6/8/2012     Scolioses      Undiagnosed cardiac murmurs 9/11/2017       PAST SURGICAL HISTORY:  Past Surgical History:   Procedure Laterality Date     HERNIA REPAIR, INGUINAL RT/LT      Hernia Repair, RT/LT     ingrown toenail       THORACOSCOPIC REPAIR PECTUS EXCAVATUM CHILD ([facility])       tooth removal         FAMILY HISTORY:  Family History   Problem Relation Age of Onset     Hypertension Father      Cardiovascular Mother      HEART DISEASE Maternal Grandmother      DIABETES Paternal Grandfather        SOCIAL HISTORY:  Social History     Social History     Marital status: Single     Spouse  "name: N/A     Number of children: N/A     Years of education: N/A     Social History Main Topics     Smoking status: Former Smoker     Packs/day: 0.02     Years: 10.00     Types: Cigarettes, Cigars     Quit date: 2/28/2013     Smokeless tobacco: Never Used     Alcohol use No     Drug use: No     Sexual activity: Not Currently     Partners: Female     Birth control/ protection: Abstinence     Other Topics Concern     Parent/Sibling W/ Cabg, Mi Or Angioplasty Before 65f 55m? No     Social History Narrative       Review of Systems:  Skin:  Positive for     Eyes:  Positive for glasses  ENT:  Negative    Respiratory:  Negative    Cardiovascular:    dizziness;Positive for  Gastroenterology: Negative    Genitourinary:  Negative    Musculoskeletal:  Negative    Neurologic:  Negative    Psychiatric:  Negative    Heme/Lymph/Imm:  Negative    Endocrine:  Positive for hot flashes    Physical Exam:  Vitals: /76  Pulse 75  Ht 1.708 m (5' 7.25\")  Wt 78.2 kg (172 lb 6.4 oz)  BMI 26.8 kg/m2    Constitutional:  cooperative, alert and oriented, well developed, well nourished, in no acute distress        Skin:  warm and dry to the touch, no apparent skin lesions or masses noted        Head:  normocephalic, no masses or lesions        Eyes:  pupils equal and round, conjunctivae and lids unremarkable, sclera white, no xanthalasma, EOMS intact, no nystagmus        ENT:  no pallor or cyanosis, dentition good        Neck:  carotid pulses are full and equal bilaterally, JVP normal, no carotid bruit, no thyromegaly        Chest:  healed surgical scar;clear to auscultation   severe pectus excavatum.    Cardiac: regular rhythm;normal S1 and S2;apical impulse not displaced       systolic murmur;grade 1          Abdomen:  abdomen soft, non-tender, BS normoactive, no mass, no HSM, no bruits        Vascular: pulses full and equal, no bruits auscultated                                      Extremities and Back:  no deformities, clubbing, " cyanosis, erythema observed        Neurological:  affect appropriate, oriented to time, person and place          Recent Lab Results:  LIPID RESULTS:  Lab Results   Component Value Date    CHOL 201 (H) 07/26/2016    HDL 37 (L) 07/26/2016    LDL 92 07/26/2016    TRIG 362 (H) 07/26/2016    CHOLHDLRATIO 3.3 06/08/2012       LIVER ENZYME RESULTS:  Lab Results   Component Value Date    AST 24 07/26/2016    ALT 47 07/26/2016       CBC RESULTS:  Lab Results   Component Value Date    WBC 13.9 (H) 12/15/2016    RBC 5.35 12/15/2016    HGB 16.4 12/15/2016    HCT 46.5 12/15/2016    MCV 87 12/15/2016    MCH 30.7 12/15/2016    MCHC 35.3 12/15/2016    RDW 12.5 12/15/2016     12/15/2016       BMP RESULTS:  Lab Results   Component Value Date     07/26/2016    POTASSIUM 4.3 07/26/2016    CHLORIDE 106 07/26/2016    CO2 28 07/26/2016    ANIONGAP 6 07/26/2016    GLC 77 07/26/2016    BUN 11 07/26/2016    CR 0.76 07/26/2016    GFRESTIMATED >90  Non  GFR Calc   07/26/2016    GFRESTBLACK >90   GFR Calc   07/26/2016    SHANDA 9.1 07/26/2016        A1C RESULTS:  No results found for: A1C    INR RESULTS:  No results found for: INR        CC  Ramírez Owens MD  [address]  [place] [address]

## 2017-09-28 ENCOUNTER — TELEPHONE (OUTPATIENT)
Dept: CARDIOLOGY | Facility: CLINIC | Age: 37
End: 2017-09-28

## 2017-09-28 NOTE — TELEPHONE ENCOUNTER
Patient's mother, Kay, called stating when she scheduled the cardiac monitor to be placed on 9/29, she had forgotten that Mehran has a trip to [place] the beginning of October and is asking that the monitor placement be after the trip.  Explained Dr. Lopez is not in the office today or tomorrow. She is willing to take the risk on her own to not have the monitor placed until after trip. Aware he could have another 'spell' during this time, which we would not have knowledge as to if rhythm related.   Transferred to scheduling per her insistence.

## 2017-10-01 PROBLEM — R01.1 UNDIAGNOSED CARDIAC MURMURS: Status: RESOLVED | Noted: 2017-09-11 | Resolved: 2017-10-01

## 2017-10-01 PROBLEM — F79 INTELLECTUAL DISABILITY: Status: ACTIVE | Noted: 2017-10-01

## 2017-10-01 PROBLEM — I51.7 ATRIAL SEPTAL HYPERTROPHY: Status: ACTIVE | Noted: 2017-10-01

## 2017-10-19 ENCOUNTER — HOSPITAL ENCOUNTER (OUTPATIENT)
Dept: CARDIOLOGY | Facility: CLINIC | Age: 37
Discharge: HOME | End: 2017-10-19
Attending: INTERNAL MEDICINE | Admitting: INTERNAL MEDICINE
Payer: COMMERCIAL

## 2017-10-19 DIAGNOSIS — I42.2 HYPERTROPHIC CARDIOMYOPATHY (H): ICD-10-CM

## 2017-10-19 PROCEDURE — 93270 REMOTE 30 DAY ECG REV/REPORT: CPT

## 2017-10-19 PROCEDURE — 93272 ECG/REVIEW INTERPRET ONLY: CPT | Performed by: INTERNAL MEDICINE

## 2017-10-20 ENCOUNTER — TELEPHONE (OUTPATIENT)
Dept: CARDIOLOGY | Facility: CLINIC | Age: 37
End: 2017-10-20

## 2017-10-20 NOTE — TELEPHONE ENCOUNTER
Cardionet monitor ordered by Dr. MILTON 9-27-17  Base line strip 10-19-17 noted showing sinus arrythmia HR 60 and 66. Strip placed in folder

## 2017-10-31 ENCOUNTER — OFFICE VISIT (OUTPATIENT)
Dept: FAMILY MEDICINE | Facility: CLINIC | Age: 37
End: 2017-10-31
Payer: COMMERCIAL

## 2017-10-31 DIAGNOSIS — D48.5 NEOPLASM OF UNCERTAIN BEHAVIOR OF SKIN: Primary | ICD-10-CM

## 2017-10-31 DIAGNOSIS — D23.5 DYSPLASTIC NEVUS OF TRUNK: ICD-10-CM

## 2017-10-31 DIAGNOSIS — Z80.8 FAMILY HISTORY OF NONMELANOMA SKIN CANCER: ICD-10-CM

## 2017-10-31 DIAGNOSIS — D22.5 COMPOUND NEVUS OF BACK: ICD-10-CM

## 2017-10-31 PROCEDURE — 88305 TISSUE EXAM BY PATHOLOGIST: CPT | Performed by: FAMILY MEDICINE

## 2017-10-31 PROCEDURE — 99000 SPECIMEN HANDLING OFFICE-LAB: CPT | Performed by: FAMILY MEDICINE

## 2017-10-31 PROCEDURE — 11301 SHAVE SKIN LESION 0.6-1.0 CM: CPT | Mod: 59 | Performed by: FAMILY MEDICINE

## 2017-10-31 PROCEDURE — 99213 OFFICE O/P EST LOW 20 MIN: CPT | Mod: 25 | Performed by: FAMILY MEDICINE

## 2017-10-31 PROCEDURE — 11301 SHAVE SKIN LESION 0.6-1.0 CM: CPT | Performed by: FAMILY MEDICINE

## 2017-10-31 NOTE — PATIENT INSTRUCTIONS
"FUTURE APPOINTMENTS  Follow up per pathology report.    WOUND CARE INSTRUCTIONS  1. After 24 hours, change dressing daily.  2. Wash hands before every dressing change.  3. Wash the wound area with a mild soap, then rinse  4. Gently pat dry with a sterile gauze or Q-tip.  5. Apply Vaseline or Aquaphor only over entire wound. Do NOT use Neosporin - as many people react to neomycin.  6. Finally, cover with a bandage or sterile non-stick gauze with micropore paper tape.  7. Repeat once daily until wound has healed.    Do not let the wound dry out.  The wound will heal faster and with better cosmetic results if routinely kept moist with step #5. It is a false belief that a wound heals better when it is exposed to air and allowed to dry out.      Soap, water and shampoo will not hurt this area.    Do not go swimming or take baths, but showering is encouraged.    Limit use of the area where the procedure was done for a few days to allow for optimal healing.    If you experience bleeding:  Repeat steps #2-5 and hold firm pressure on the area for 10 minutes without checking to see if the bleeding has stopped. \"Checking\" pulls off the protective wound clot and restarts the bleeding all over again. Re-apply pressure for 10 minutes if necessary to stop bleeding.  Use additional sterile gauze and tape to maintain pressure once bleeding has stopped.    Signs of Infection:  Infection can occur in any area where skin has been disrupted.  If you notice persistent redness, swelling, colored drainage, increasing pain, fever or other signs of infection, please call us at: [phone] and ask to have me or my colleague paged. We will call you back to discuss.    Pathology Results:  You will be notified, generally via letter or MyChart, in approximately 10 days. If there is anything we need to discuss or further treatment needed, I will call you to discuss it.    Skin tissue can be some of the most challenging tissue for pathologists " to examine, therefore we may use a specialist outside the [employer] system to read certain submitted tissue samples. When submitted to these outside specialists, [employer] will notify you that your tissue sample result has returned. However, this only means that the tissue sample has been sent to the specialist. These results are not available in [employer], so I will contact you when I receive the final report.    PATIENT INFORMATION : WOUNDS  During the healing process you will notice a number of changes. All wounds develop a small halo of redness surrounding the wound.  This means healing is occurring. Severe itching with extensive redness usually indicates sensitivity to the ointment or bandage tape used to dress the wound.  You should call our office if this develops.      Swelling  and/or discoloration around your surgical site is common, particularly when performed around the eye.    All wounds normally drain.  The larger the wound the more drainage there will be.  After 7-10 days, you will notice the wound beginning to shrink and new skin will begin to grow.  The wound is healed when you can see skin has formed over the entire area.  A healed wound has a healthy, shiny look to the surface and is red to dark pink in color to normalize.  Wounds may take approximately 4-6 weeks to heal.  Larger wounds may take 6-8 weeks. After the wound is healed you may discontinue dressing changes.    You may experience a sensation of tightness as your wound heals. This is normal and will gradually subside.    Your healed wound may be sensitive to temperature changes. This sensitivity improves with time, but if you re having a lot of discomfort, try to avoid temperature extremes.    Patients frequently experience itching after their wound appears to have healed because of the continue healing under the skin.  Plain Vaseline will help relieve the itching.

## 2017-10-31 NOTE — MR AVS SNAPSHOT
"              After Visit Summary   10/31/2017    Mehran Ibarra    MRN: [id number]           Patient Information     Date Of Birth          1980        Visit Information        Provider Department      10/31/2017 9:40 AM Sheila Carreno MD [facility] - Primary Care Skin        Today's Diagnoses     Neoplasm of uncertain behavior of skin    -  1    Family history of nonmelanoma skin cancer          Care Instructions    FUTURE APPOINTMENTS  Follow up per pathology report.    WOUND CARE INSTRUCTIONS  1. After 24 hours, change dressing daily.  2. Wash hands before every dressing change.  3. Wash the wound area with a mild soap, then rinse  4. Gently pat dry with a sterile gauze or Q-tip.  5. Apply Vaseline or Aquaphor only over entire wound. Do NOT use Neosporin - as many people react to neomycin.  6. Finally, cover with a bandage or sterile non-stick gauze with micropore paper tape.  7. Repeat once daily until wound has healed.    Do not let the wound dry out.  The wound will heal faster and with better cosmetic results if routinely kept moist with step #5. It is a false belief that a wound heals better when it is exposed to air and allowed to dry out.      Soap, water and shampoo will not hurt this area.    Do not go swimming or take baths, but showering is encouraged.    Limit use of the area where the procedure was done for a few days to allow for optimal healing.    If you experience bleeding:  Repeat steps #2-5 and hold firm pressure on the area for 10 minutes without checking to see if the bleeding has stopped. \"Checking\" pulls off the protective wound clot and restarts the bleeding all over again. Re-apply pressure for 10 minutes if necessary to stop bleeding.  Use additional sterile gauze and tape to maintain pressure once bleeding has stopped.    Signs of Infection:  Infection can occur in any area where skin has been disrupted.  If you notice persistent redness, swelling, colored " drainage, increasing pain, fever or other signs of infection, please call us at: [phone] and ask to have me or my colleague paged. We will call you back to discuss.    Pathology Results:  You will be notified, generally via letter or MyChart, in approximately 10 days. If there is anything we need to discuss or further treatment needed, I will call you to discuss it.    Skin tissue can be some of the most challenging tissue for pathologists to examine, therefore we may use a specialist outside the [facility] system to read certain submitted tissue samples. When submitted to these outside specialists, [employer] will notify you that your tissue sample result has returned. However, this only means that the tissue sample has been sent to the specialist. These results are not available in [employer], so I will contact you when I receive the final report.    PATIENT INFORMATION : WOUNDS  During the healing process you will notice a number of changes. All wounds develop a small halo of redness surrounding the wound.  This means healing is occurring. Severe itching with extensive redness usually indicates sensitivity to the ointment or bandage tape used to dress the wound.  You should call our office if this develops.      Swelling  and/or discoloration around your surgical site is common, particularly when performed around the eye.    All wounds normally drain.  The larger the wound the more drainage there will be.  After 7-10 days, you will notice the wound beginning to shrink and new skin will begin to grow.  The wound is healed when you can see skin has formed over the entire area.  A healed wound has a healthy, shiny look to the surface and is red to dark pink in color to normalize.  Wounds may take approximately 4-6 weeks to heal.  Larger wounds may take 6-8 weeks. After the wound is healed you may discontinue dressing changes.    You may experience a sensation of tightness as your wound heals. This is normal and will  "gradually subside.    Your healed wound may be sensitive to temperature changes. This sensitivity improves with time, but if you re having a lot of discomfort, try to avoid temperature extremes.    Patients frequently experience itching after their wound appears to have healed because of the continue healing under the skin.  Plain Vaseline will help relieve the itching.          Follow-ups after your visit        Your next 10 appointments already scheduled     Nov 28, 2017  2:45 PM CST   Return Visit with Avni Lopez MD   [facility] ([facility])    [address] [place] [id number] [phone]              Who to contact     If you have questions or need follow up information about today's clinic visit or your schedule please contact [facility] - PRIMARY CARE SKIN directly at [phone].  Normal or non-critical lab and imaging results will be communicated to you by [employer], letter or phone within 4 business days after the clinic has received the results. If you do not hear from us within 7 days, please contact the clinic through [employer] or phone. If you have a critical or abnormal lab result, we will notify you by phone as soon as possible.  Submit refill requests through [employer] software or call your pharmacy and they will forward the refill request to us. Please allow 3 business days for your refill to be completed.          Additional Information About Your Visit        [email or web address] Information     [employer] software lets you send messages to your doctor, view your test results, renew your prescriptions, schedule appointments and more. To sign up, go to [email or web address] . Click on \"Log in\" on the left side of the screen, which will take you to the Welcome page. Then click on \"Sign up Now\" on the right side of the page.     You will be asked to enter the access code listed below, as well as some personal information. Please follow the directions " to create your username and password.     Your access code is: [email or web address]  Expires: 12/10/2017 11:43 AM     Your access code will  in 90 days. If you need help or a new code, please call your [facility] clinic or [phone].        Care EveryWhere ID     This is your Care EveryWhere ID. This could be used by other organizations to access your [facility] medical records  [id number]         Blood Pressure from Last 3 Encounters:   17 122/76   17 122/80   17 130/90    Weight from Last 3 Encounters:   17 172 lb 6.4 oz (78.2 kg)   17 170 lb 8 oz (77.3 kg)   17 172 lb 11.2 oz (78.3 kg)              We Performed the Following     CL SURG PATH [facility] DERM     SHAV SKIN LESION TRUNK/ARM/LEG 0.6-1.0 CM        Primary Care Provider Office Phone # Fax #    Arpan Flores [phone]       [address] [address]        Equal Access to Services     [facility]: Hadii aad ku hadasho Soomaali, waaxda luqadaha, qaybta kaalmada adeegyada, nichole mcmillan . So [facility] [phone].    ATENCIÓN: Si habla español, tiene a english disposición servicios gratuitos de asistencia lingüística. Llame al [phone].    We comply with applicable federal civil rights laws and [place] laws. We do not discriminate on the basis of race, color, national origin, age, disability, sex, sexual orientation, or gender identity.            Thank you!     Thank you for choosing [facility] - PRIMARY CARE SKIN  for your care. Our goal is always to provide you with excellent care. Hearing back from our patients is one way we can continue to improve our services. Please take a few minutes to complete the written survey that you may receive in the mail after your visit with us. Thank you!             Your Updated Medication List - Protect others around you: Learn how to safely use, store and throw away your medicines at  www.disposemymeds.org.          This list is accurate as of: 10/31/17  9:50 AM.  Always use your most recent med list.                   Brand Name Dispense Instructions for use Diagnosis    amphetamine-dextroamphetamine 5 MG per tablet    ADDERALL    5 tablet    Take 1 tablet (5 mg) by mouth daily    ADHD (attention deficit hyperactivity disorder), Generalised anxiety disorder       citalopram 10 MG tablet    celeXA    5 tablet    Take 1 tablet (10 mg) by mouth daily    Generalised anxiety disorder       guanFACINE 1 MG tablet    TENEX    10 tablet    Take 2 tablets (2 mg) by mouth At Bedtime    Generalised anxiety disorder       ketoconazole 2 % cream    NIZORAL    15 g    Apply topically 2 times daily    Athlete's foot on right

## 2017-10-31 NOTE — PROGRESS NOTES
"[facility] - PRIMARY CARE SKIN    CC : Lesion(s)  SUBJECTIVE:                                                    Mehran Ibarra is a 37 year old male who presents to clinic today with mother and guardian because of a lesion on the back.    Bothersome lesions noticed by the patient or other skin concerns :  Issue One : Moles on the back.  Onset : varies.  Enlarging : NO.  Bleeding : NO  Itchy or irritating : NO.  Pain or tenderness : NO.  Changing color : NO.    Personal history of skin cancer : NO.  Family history of skin cancer : YES - non-melanoma in maternal grandmother on face.    Sun Exposure History  Previous history of significant sun exposure:  Blistering sunburns : YES - once  Tanning beds : NO.  Sunscreen Use : NO.  Sun-protective clothing use : No  Wide-brimmed hats : No  Sunglasses : No  Seeks shade : Yes    Refer to electronic medical record (EMR) for past medical history and medications.    INTEGUMENTARY/SKIN: POSITIVE for mole changes  ROS : 14 point review of systems was negative except the symptoms listed above in the HPI.    This document serves as a record of the services and decisions personally performed and made by Raven Carreno MD. It was created on her behalf by Asim Glaser, a trained medical scribe.  The creation of this document is based on the scribe's personal observations and the provider's statements to the medical scribe.  Asim Glaser, October 31, 2017 9:44 AM      OBJECTIVE:                                                    GENERAL: healthy, alert and no distress  SKIN: [place] Skin Type - II.  Neck, Trunk were examined. The dermatoscope was used to help evaluate pigmented lesions.  Skin Pertinent Findings:  Back : Multiple \"stuck on\" appearing papules, raised, brown, coarse-textured, round lesion(s) most consistent with seborrheic keratoses.    Back, 6 cm left of midline, at T5 : 8 mm x 5 mm in size, brown-pink macule with slightly raised center and surrounding brown " "pigmentation. ? Atypical Nevus ? Other    Back, in midline, at T4 : 7 mm x 4 mm in size, brown macule with atypical pigmentation. ? Atypical Nevus ? Other    Diagnostic Test Results:  none           ASSESSMENT:                                                      Encounter Diagnoses   Name Primary?     Neoplasm of uncertain behavior of skin Yes     Family history of nonmelanoma skin cancer          PLAN:                                                    Patient Instructions   FUTURE APPOINTMENTS  Follow up per pathology report.    WOUND CARE INSTRUCTIONS  1. After 24 hours, change dressing daily.  2. Wash hands before every dressing change.  3. Wash the wound area with a mild soap, then rinse  4. Gently pat dry with a sterile gauze or Q-tip.  5. Apply Vaseline or Aquaphor only over entire wound. Do NOT use Neosporin - as many people react to neomycin.  6. Finally, cover with a bandage or sterile non-stick gauze with micropore paper tape.  7. Repeat once daily until wound has healed.    Do not let the wound dry out.  The wound will heal faster and with better cosmetic results if routinely kept moist with step #5. It is a false belief that a wound heals better when it is exposed to air and allowed to dry out.      Soap, water and shampoo will not hurt this area.    Do not go swimming or take baths, but showering is encouraged.    Limit use of the area where the procedure was done for a few days to allow for optimal healing.    If you experience bleeding:  Repeat steps #2-5 and hold firm pressure on the area for 10 minutes without checking to see if the bleeding has stopped. \"Checking\" pulls off the protective wound clot and restarts the bleeding all over again. Re-apply pressure for 10 minutes if necessary to stop bleeding.  Use additional sterile gauze and tape to maintain pressure once bleeding has stopped.    Signs of Infection:  Infection can occur in any area where skin has been disrupted.  If you notice " persistent redness, swelling, colored drainage, increasing pain, fever or other signs of infection, please call us at: [phone] and ask to have me or my colleague paged. We will call you back to discuss.    Pathology Results:  You will be notified, generally via letter or MyChart, in approximately 10 days. If there is anything we need to discuss or further treatment needed, I will call you to discuss it.    Skin tissue can be some of the most challenging tissue for pathologists to examine, therefore we may use a specialist outside the [employer] system to read certain submitted tissue samples. When submitted to these outside specialists, [employer] will notify you that your tissue sample result has returned. However, this only means that the tissue sample has been sent to the specialist. These results are not available in [employer], so I will contact you when I receive the final report.    PATIENT INFORMATION : WOUNDS  During the healing process you will notice a number of changes. All wounds develop a small halo of redness surrounding the wound.  This means healing is occurring. Severe itching with extensive redness usually indicates sensitivity to the ointment or bandage tape used to dress the wound.  You should call our office if this develops.      Swelling  and/or discoloration around your surgical site is common, particularly when performed around the eye.    All wounds normally drain.  The larger the wound the more drainage there will be.  After 7-10 days, you will notice the wound beginning to shrink and new skin will begin to grow.  The wound is healed when you can see skin has formed over the entire area.  A healed wound has a healthy, shiny look to the surface and is red to dark pink in color to normalize.  Wounds may take approximately 4-6 weeks to heal.  Larger wounds may take 6-8 weeks. After the wound is healed you may discontinue dressing changes.    You may experience a sensation of tightness as your  wound heals. This is normal and will gradually subside.    Your healed wound may be sensitive to temperature changes. This sensitivity improves with time, but if you re having a lot of discomfort, try to avoid temperature extremes.    Patients frequently experience itching after their wound appears to have healed because of the continue healing under the skin.  Plain Vaseline will help relieve the itching.            PROCEDURES:                                                    Name : Shave Excision  Indication : Excision of tissue for pathology evaluation.  Location(s) : Back, 6 cm left of midline, at T5 : 8 mm x 5 mm in size, brown-pink macule with slightly raised center and surrounding brown pigmentation. ? Atypical Nevus ? Other.  Completed by : Raven Carreno MD  Photo Taken : no.  Anesthesia : Patient was anesthetized by infiltrating the area surrounding the lesion with 1% lidocaine.   epinephrine 1:046296 : Yes.  Buffered with bicarbonate : Yes.  Note : Discussed the risk of pain, infection, scarring, hypo- or hyperpigmentation and recurrence or need for re-treatment. The benefits of treatment and alternative treatments were also discussed.    During this procedure, the universal protocol was utilized. The patient's identity was confirmed by no less than two patient identifiers, correct procedure was verified, correct site was verified and marked as applicable and a final pause was completed.    Sterile technique was used throughout the procedure. The skin was cleaned and prepped with surgical cleanser. Once adequate anesthesia was obtained, the lesion was removed with a deep scallop shave procedure. The specimen was sent to pathology.    Direct pressure and monsels's and monopolar cautery was applied for hemostasis. No bleeding was present upon the completion of the procedure. The wound was coated with antibacterial ointment. A dry sterile dressing was applied. Patient tolerated the procedure well and left  in satisfactory condition.    Primary provider and referring provider will be informed regarding the tissue report when it returns.    Name : Shave Excision  Indication : Excision of tissue for pathology evaluation.  Location(s) : Back, in midline, at T4 : 7 mm x 4 mm in size, brown macule with atypical pigmentation. ? Atypical Nevus ? Other.  Completed by : Raven Carreno MD  Photo Taken : no.  Anesthesia : Patient was anesthetized by infiltrating the area surrounding the lesion with 1% lidocaine.   epinephrine 1:522106 : Yes.  Buffered with bicarbonate : Yes.  Note : Discussed the risk of pain, infection, scarring, hypo- or hyperpigmentation and recurrence or need for re-treatment. The benefits of treatment and alternative treatments were also discussed.    During this procedure, the universal protocol was utilized. The patient's identity was confirmed by no less than two patient identifiers, correct procedure was verified, correct site was verified and marked as applicable and a final pause was completed.    Sterile technique was used throughout the procedure. The skin was cleaned and prepped with surgical cleanser. Once adequate anesthesia was obtained, the lesion was removed with a deep scallop shave procedure. The specimen was sent to pathology.    Direct pressure and monsels's and monopolar cautery was applied for hemostasis. No bleeding was present upon the completion of the procedure. The wound was coated with antibacterial ointment. A dry sterile dressing was applied. Patient tolerated the procedure well and left in satisfactory condition.    Primary provider and referring provider will be informed regarding the tissue report when it returns.        The information in this document, created by the medical scribe for me, accurately reflects the services I personally performed and the decisions made by me. I have reviewed and approved this document for accuracy prior to leaving the patient care area.  Raven  MD Wai October 31, 2017 9:44 AM  [facility] - PRIMARY CARE SKIN

## 2017-10-31 NOTE — LETTER
November 3, 2017    Mehran Iabrra  [address]  [place] [address]        Dear Mehran,    The lesion that was removed from the midline of back was benign (not cancer) and is called a mild atypical nevus. A nevus with atypical changes has a small potential to evolve into a melanoma; therefore, we usually recommend completely removing the atypical nevus. Your atypical nevus was completely removed. If any pigmentation should recur then it should be evaluated.     The lesion(s) that was removed the left back has been found to be benign (not cancer) and called a compound nevus (mold). Thus, no further treatment is needed. The tissue sample was sent to [facility], so I have included the report to this letter.    I would recommend annual full-body skin exam.     Thank you for allowing me to be involved in your health care and for choosing [place] [facility].  If you have any questions or concerns please feel free to contact me, [phone].      Sincerely,      Sheila Carreno M.D.

## 2017-10-31 NOTE — LETTER
10/31/2017         RE: Mehran Ibarra  [address]  [place] [address]        Dear Colleague,    Thank you for referring your patient, Mehran Ibarra, to the [facility] - PRIMARY CARE SKIN. Please see a copy of my visit note below.    [facility] PRIMARY CARE SKIN    CC : Lesion(s)  SUBJECTIVE:                                                    Mehran Ibarra is a 37 year old male who presents to clinic today with mother and guardian because of a lesion on the back.    Bothersome lesions noticed by the patient or other skin concerns :  Issue One : Moles on the back.  Onset : varies.  Enlarging : NO.  Bleeding : NO  Itchy or irritating : NO.  Pain or tenderness : NO.  Changing color : NO.    Personal history of skin cancer : NO.  Family history of skin cancer : YES - non-melanoma in maternal grandmother on face.    Sun Exposure History  Previous history of significant sun exposure:  Blistering sunburns : YES - once  Tanning beds : NO.  Sunscreen Use : NO.  Sun-protective clothing use : No  Wide-brimmed hats : No  Sunglasses : No  Seeks shade : Yes    Refer to electronic medical record (EMR) for past medical history and medications.    INTEGUMENTARY/SKIN: POSITIVE for mole changes  ROS : 14 point review of systems was negative except the symptoms listed above in the HPI.    This document serves as a record of the services and decisions personally performed and made by Raven Carreno MD. It was created on her behalf by Asim Glaser, a trained medical scribe.  The creation of this document is based on the scribe's personal observations and the provider's statements to the medical scribe.  Asim Glaser, October 31, 2017 9:44 AM      OBJECTIVE:                                                    GENERAL: healthy, alert and no distress  SKIN: [place] Skin Type - II.  Neck, Trunk were examined. The dermatoscope was used to help evaluate pigmented lesions.  Skin Pertinent Findings:  Back :  "Multiple \"stuck on\" appearing papules, raised, brown, coarse-textured, round lesion(s) most consistent with seborrheic keratoses.    Back, 6 cm left of midline, at T5 : 8 mm x 5 mm in size, brown-pink macule with slightly raised center and surrounding brown pigmentation. ? Atypical Nevus ? Other    Back, in midline, at T4 : 7 mm x 4 mm in size, brown macule with atypical pigmentation. ? Atypical Nevus ? Other    Diagnostic Test Results:  none           ASSESSMENT:                                                      Encounter Diagnoses   Name Primary?     Neoplasm of uncertain behavior of skin Yes     Family history of nonmelanoma skin cancer          PLAN:                                                    Patient Instructions   FUTURE APPOINTMENTS  Follow up per pathology report.    WOUND CARE INSTRUCTIONS  1. After 24 hours, change dressing daily.  2. Wash hands before every dressing change.  3. Wash the wound area with a mild soap, then rinse  4. Gently pat dry with a sterile gauze or Q-tip.  5. Apply Vaseline or Aquaphor only over entire wound. Do NOT use Neosporin - as many people react to neomycin.  6. Finally, cover with a bandage or sterile non-stick gauze with micropore paper tape.  7. Repeat once daily until wound has healed.    Do not let the wound dry out.  The wound will heal faster and with better cosmetic results if routinely kept moist with step #5. It is a false belief that a wound heals better when it is exposed to air and allowed to dry out.      Soap, water and shampoo will not hurt this area.    Do not go swimming or take baths, but showering is encouraged.    Limit use of the area where the procedure was done for a few days to allow for optimal healing.    If you experience bleeding:  Repeat steps #2-5 and hold firm pressure on the area for 10 minutes without checking to see if the bleeding has stopped. \"Checking\" pulls off the protective wound clot and restarts the bleeding all over again. " Re-apply pressure for 10 minutes if necessary to stop bleeding.  Use additional sterile gauze and tape to maintain pressure once bleeding has stopped.    Signs of Infection:  Infection can occur in any area where skin has been disrupted.  If you notice persistent redness, swelling, colored drainage, increasing pain, fever or other signs of infection, please call us at: [phone] and ask to have me or my colleague paged. We will call you back to discuss.    Pathology Results:  You will be notified, generally via letter or MyChart, in approximately 10 days. If there is anything we need to discuss or further treatment needed, I will call you to discuss it.    Skin tissue can be some of the most challenging tissue for pathologists to examine, therefore we may use a specialist outside the [employer] system to read certain submitted tissue samples. When submitted to these outside specialists, [facility] will notify you that your tissue sample result has returned. However, this only means that the tissue sample has been sent to the specialist. These results are not available in [employer], so I will contact you when I receive the final report.    PATIENT INFORMATION : WOUNDS  During the healing process you will notice a number of changes. All wounds develop a small halo of redness surrounding the wound.  This means healing is occurring. Severe itching with extensive redness usually indicates sensitivity to the ointment or bandage tape used to dress the wound.  You should call our office if this develops.      Swelling  and/or discoloration around your surgical site is common, particularly when performed around the eye.    All wounds normally drain.  The larger the wound the more drainage there will be.  After 7-10 days, you will notice the wound beginning to shrink and new skin will begin to grow.  The wound is healed when you can see skin has formed over the entire area.  A healed wound has a healthy, shiny look to the  surface and is red to dark pink in color to normalize.  Wounds may take approximately 4-6 weeks to heal.  Larger wounds may take 6-8 weeks. After the wound is healed you may discontinue dressing changes.    You may experience a sensation of tightness as your wound heals. This is normal and will gradually subside.    Your healed wound may be sensitive to temperature changes. This sensitivity improves with time, but if you re having a lot of discomfort, try to avoid temperature extremes.    Patients frequently experience itching after their wound appears to have healed because of the continue healing under the skin.  Plain Vaseline will help relieve the itching.            PROCEDURES:                                                    Name : Shave Excision  Indication : Excision of tissue for pathology evaluation.  Location(s) : Back, 6 cm left of midline, at T5 : 8 mm x 5 mm in size, brown-pink macule with slightly raised center and surrounding brown pigmentation. ? Atypical Nevus ? Other.  Completed by : Raven Carreno MD  Photo Taken : no.  Anesthesia : Patient was anesthetized by infiltrating the area surrounding the lesion with 1% lidocaine.   epinephrine [id number] : Yes.  Buffered with bicarbonate : Yes.  Note : Discussed the risk of pain, infection, scarring, hypo- or hyperpigmentation and recurrence or need for re-treatment. The benefits of treatment and alternative treatments were also discussed.    During this procedure, the universal protocol was utilized. The patient's identity was confirmed by no less than two patient identifiers, correct procedure was verified, correct site was verified and marked as applicable and a final pause was completed.    Sterile technique was used throughout the procedure. The skin was cleaned and prepped with surgical cleanser. Once adequate anesthesia was obtained, the lesion was removed with a deep scallop shave procedure. The specimen was sent to pathology.    Direct pressure  and monsels's and monopolar cautery was applied for hemostasis. No bleeding was present upon the completion of the procedure. The wound was coated with antibacterial ointment. A dry sterile dressing was applied. Patient tolerated the procedure well and left in satisfactory condition.    Primary provider and referring provider will be informed regarding the tissue report when it returns.    Name : Shave Excision  Indication : Excision of tissue for pathology evaluation.  Location(s) : Back, in midline, at T4 : 7 mm x 4 mm in size, brown macule with atypical pigmentation. ? Atypical Nevus ? Other.  Completed by : Raven Carreno MD  Photo Taken : no.  Anesthesia : Patient was anesthetized by infiltrating the area surrounding the lesion with 1% lidocaine.   epinephrine [id number] : Yes.  Buffered with bicarbonate : Yes.  Note : Discussed the risk of pain, infection, scarring, hypo- or hyperpigmentation and recurrence or need for re-treatment. The benefits of treatment and alternative treatments were also discussed.    During this procedure, the universal protocol was utilized. The patient's identity was confirmed by no less than two patient identifiers, correct procedure was verified, correct site was verified and marked as applicable and a final pause was completed.    Sterile technique was used throughout the procedure. The skin was cleaned and prepped with surgical cleanser. Once adequate anesthesia was obtained, the lesion was removed with a deep scallop shave procedure. The specimen was sent to pathology.    Direct pressure and monsels's and monopolar cautery was applied for hemostasis. No bleeding was present upon the completion of the procedure. The wound was coated with antibacterial ointment. A dry sterile dressing was applied. Patient tolerated the procedure well and left in satisfactory condition.    Primary provider and referring provider will be informed regarding the tissue report when it returns.        The  information in this document, created by the medical scribe for me, accurately reflects the services I personally performed and the decisions made by me. I have reviewed and approved this document for accuracy prior to leaving the patient care area.  Raven Carreno MD October 31, 2017 9:44 AM  [facility] - PRIMARY CARE SKIN    Again, thank you for allowing me to participate in the care of your patient.        Sincerely,        Sheila Carreno MD

## 2017-11-03 ENCOUNTER — TELEPHONE (OUTPATIENT)
Dept: FAMILY MEDICINE | Facility: CLINIC | Age: 37
End: 2017-11-03

## 2017-11-03 NOTE — TELEPHONE ENCOUNTER
Left message on answering machine for patient to call back.  Amina HeartRN  [facility]  [phone]      Dear Mehran,    The lesion that was removed from the midline of back was benign (not cancer) and is called a mild atypical nevus. A nevus with atypical changes has a small potential to evolve into a melanoma; therefore, we usually recommend completely removing the atypical nevus. Your atypical nevus was completely removed. If any pigmentation should recur then it should be evaluated.     The lesion(s) that was removed the left back has been found to be benign (not cancer) and called a compound nevus (mold). Thus, no further treatment is needed. The tissue sample was sent to [facility], so I have included the report to this letter.    I would recommend annual full-body skin exam.

## 2017-11-03 NOTE — PROGRESS NOTES
ADDENDUM:                                                    November 3, 2017 7:01 AM  Pathology report results:

## 2017-11-06 NOTE — TELEPHONE ENCOUNTER
Mother notified.    Amina ESPARZARN  [facility]  [phone]

## 2018-05-02 ENCOUNTER — OFFICE VISIT (OUTPATIENT)
Dept: FAMILY MEDICINE | Facility: CLINIC | Age: 38
End: 2018-05-02
Payer: COMMERCIAL

## 2018-05-02 VITALS
HEART RATE: 76 BPM | WEIGHT: 172.8 LBS | BODY MASS INDEX: 26.86 KG/M2 | SYSTOLIC BLOOD PRESSURE: 141 MMHG | TEMPERATURE: 99.7 F | DIASTOLIC BLOOD PRESSURE: 79 MMHG

## 2018-05-02 DIAGNOSIS — R07.0 THROAT PAIN: Primary | ICD-10-CM

## 2018-05-02 DIAGNOSIS — J00 COMMON COLD VIRUS: ICD-10-CM

## 2018-05-02 LAB
DEPRECATED S PYO AG THROAT QL EIA: NORMAL
SPECIMEN SOURCE: NORMAL

## 2018-05-02 PROCEDURE — 87880 STREP A ASSAY W/OPTIC: CPT | Performed by: NURSE PRACTITIONER

## 2018-05-02 PROCEDURE — 87081 CULTURE SCREEN ONLY: CPT | Performed by: NURSE PRACTITIONER

## 2018-05-02 PROCEDURE — 99213 OFFICE O/P EST LOW 20 MIN: CPT | Performed by: NURSE PRACTITIONER

## 2018-05-02 NOTE — MR AVS SNAPSHOT
After Visit Summary   5/2/2018    Mehran Ibarra    MRN: [id number]           Patient Information     Date Of Birth          1980        Visit Information        Provider Department      5/2/2018 9:20 AM Lucita Rodriguez APRN [facility]        Today's Diagnoses     Throat pain    -  1    Common cold virus          Care Instructions    Drink plenty of extra fluids, honey soothes the cough (honey and lemon in tea is great), and cold fluids help keep swelling down.     Gargle salt water a few times a day, some studies show this can kill the virus sooner. Chicken noodle soup, and good nutrition in small amounts. Vitamin C and Zinc can help boost your immune system and can be taken short term while you fight this off. Extra fluids and rest also help your body fight off the virus.     Do saline washes with neti pot or saline nasal spray a few times throughout the day    Over the counter decongestants you can try:   Oxymetazolone nasal spray  (Afrin or generic is fine):  two sprays twice daily for 3-4 days then stop to avoid rebound congestion.    OR  Pseudoephedrine: 30mg three times daily by mouth for 1-2 weeks. You need to go to the Pharmacist counter and show your ID to get this medication. Do not take if you have high blood pressure, and this medication may also keep you awake.     I would also recommend to thin mucous:  Guaifenesin regularly until you are better    For pain and discomfort:  Ibuprofen 400 - 600mg three times daily for 5-7 days (anti-inflammatory) to decrease swelling and help with pain of sinuses, nose, throat and chest. If you have Diabetes or Kidney issues, do not take this or any other NSAID     Return to clinic at any time if you develop high fevers, if worsening or in 1-2 weeks if not improving.              Follow-ups after your visit        Who to contact     If you have questions or need follow up information about today's clinic visit or your  "schedule please contact [facility] directly at [phone].  Normal or non-critical lab and imaging results will be communicated to you by MyChart, letter or phone within 4 business days after the clinic has received the results. If you do not hear from us within 7 days, please contact the clinic through [facility] or phone. If you have a critical or abnormal lab result, we will notify you by phone as soon as possible.  Submit refill requests through [place] or call your pharmacy and they will forward the refill request to us. Please allow 3 business days for your refill to be completed.          Additional Information About Your Visit        [facility] Information     [place] lets you send messages to your doctor, view your test results, renew your prescriptions, schedule appointments and more. To sign up, go to [email or web address] . Click on \"Log in\" on the left side of the screen, which will take you to the Welcome page. Then click on \"Sign up Now\" on the right side of the page.     You will be asked to enter the access code listed below, as well as some personal information. Please follow the directions to create your username and password.     Your access code is: [email or web address]  Expires: 2018 10:07 AM     Your access code will  in 90 days. If you need help or a new code, please call your [place] clinic or [phone].        Care EveryWhere ID     This is your Care EveryWhere ID. This could be used by other organizations to access your [place] medical records  [id number]        Your Vitals Were     Pulse Temperature BMI (Body Mass Index)             76 99.7  F (37.6  C) (Temporal) 26.86 kg/m2          Blood Pressure from Last 3 Encounters:   18 141/79   17 122/76   17 122/80    Weight from Last 3 Encounters:   18 172 lb 12.8 oz (78.4 kg)   17 172 lb 6.4 oz (78.2 kg)   17 170 lb 8 oz (77.3 kg)              We Performed the Following     Beta strep " group A culture     Strep, Rapid Screen          Today's Medication Changes          These changes are accurate as of 5/2/18 10:07 AM.  If you have any questions, ask your nurse or doctor.               These medicines have changed or have updated prescriptions.        Dose/Directions    guanFACINE 1 MG tablet   Commonly known as:  TENEX   This may have changed:  how much to take   Used for:  Generalised anxiety disorder        Dose:  2 mg   Take 2 tablets (2 mg) by mouth At Bedtime   Quantity:  10 tablet   Refills:  0                Primary Care Provider Office Phone # Fax #    Arpan Flores [phone]       [address]  [facility] [address]        Equal Access to Services     [facility] : Hadii lilian mitchell hadasho Sobimal, waaxda luqadaha, qaybta kaalmada adeyolyyada, nichole mcmillan . So [facility] [phone].    ATENCIÓN: Si habla español, tiene a english disposición servicios gratuitos de asistencia lingüística. Llame al [phone].    We comply with applicable federal civil rights laws and [place] laws. We do not discriminate on the basis of race, color, national origin, age, disability, sex, sexual orientation, or gender identity.            Thank you!     Thank you for choosing [facility]  for your care. Our goal is always to provide you with excellent care. Hearing back from our patients is one way we can continue to improve our services. Please take a few minutes to complete the written survey that you may receive in the mail after your visit with us. Thank you!             Your Updated Medication List - Protect others around you: Learn how to safely use, store and throw away your medicines at www.disposemymeds.org.          This list is accurate as of 5/2/18 10:07 AM.  Always use your most recent med list.                   Brand Name Dispense Instructions for use Diagnosis    amphetamine-dextroamphetamine 5 MG per tablet    ADDERALL     5 tablet    Take 1 tablet (5 mg) by mouth daily    ADHD (attention deficit hyperactivity disorder), Generalised anxiety disorder       citalopram 10 MG tablet    celeXA    5 tablet    Take 1 tablet (10 mg) by mouth daily    Generalised anxiety disorder       guanFACINE 1 MG tablet    TENEX    10 tablet    Take 2 tablets (2 mg) by mouth At Bedtime    Generalised anxiety disorder       ketoconazole 2 % cream    NIZORAL    15 g    Apply topically 2 times daily    Athlete's foot on right

## 2018-05-02 NOTE — PROGRESS NOTES
SUBJECTIVE:   Mehran Ibarra is a 38 year old male who presents to clinic today for the following health issues:  Here with his mom  Acute Illness   Acute illness concerns: sore throat, ear ache   Onset: 3-4 days     Fever: no    Chills/Sweats: no    Headache (location?): no    Sinus Pressure:no    Conjunctivitis:  no    Ear Pain: YES: both    Rhinorrhea: no    Congestion: no    Sore Throat: YES     Cough: YES - off and on     Wheeze: no    Decreased Appetite: no    Nausea: no    Vomiting: no    Diarrhea:  no    Dysuria/Freq.: no    Fatigue/Achiness: Not sleeping well     Sick/Strep Exposure: no     Therapies Tried and outcome: Drinking water and juice     Problem list and histories reviewed & adjusted, as indicated.  Additional history: as documented    Patient Active Problem List   Diagnosis     GERD (gastroesophageal reflux disease)     IBS (irritable bowel syndrome)     Pectus excavatum     Attention deficit disorder     Generalized anxiety disorder     CARDIOVASCULAR SCREENING; LDL GOAL LESS THAN 160     Global developmental delay     Seborrheic dermatitis     Localized bacterial skin infection     Atypical nevus of back     Elevated blood pressure reading without diagnosis of hypertension     Bunion of great toe of left foot     Epithelial cyst     Left ventricular hypertrophy     Scolioses     Impulse control disorder     Intellectual disability     Tobacco dependence in remission     Past Surgical History:   Procedure Laterality Date     HERNIA REPAIR, INGUINAL RT/LT      Hernia Repair, RT/LT     ingrown toenail       THORACOSCOPIC REPAIR PECTUS EXCAVATUM CHILD ([facility])       tooth removal         Social History   Substance Use Topics     Smoking status: Former Smoker     Packs/day: 0.02     Years: 10.00     Types: Cigarettes, Cigars     Quit date: 2/28/2013     Smokeless tobacco: Never Used     Alcohol use No     Family History   Problem Relation Age of Onset     Hypertension Father      Cardiovascular  Mother      HEART DISEASE Maternal Grandmother      DIABETES Paternal Grandfather          Current Outpatient Prescriptions   Medication Sig Dispense Refill     amphetamine-dextroamphetamine (ADDERALL) 5 MG tablet Take 1 tablet (5 mg) by mouth daily 5 tablet 0     citalopram (CELEXA) 10 MG tablet Take 1 tablet (10 mg) by mouth daily 5 tablet 0     guanFACINE (TENEX) 1 MG tablet Take 2 tablets (2 mg) by mouth At Bedtime (Patient taking differently: Take 1 mg by mouth At Bedtime ) 10 tablet 0     ketoconazole (NIZORAL) 2 % cream Apply topically 2 times daily (Patient not taking: Reported on 5/2/2018) 15 g 1     No Known Allergies  BP Readings from Last 3 Encounters:   05/02/18 141/79   09/27/17 122/76   09/11/17 122/80    Wt Readings from Last 3 Encounters:   05/02/18 172 lb 12.8 oz (78.4 kg)   09/27/17 172 lb 6.4 oz (78.2 kg)   09/11/17 170 lb 8 oz (77.3 kg)                  Labs reviewed in EPIC    Reviewed and updated as needed this visit by clinical staff       Reviewed and updated as needed this visit by Provider         ROS:  Constitutional, HEENT, cardiovascular, pulmonary, gi and gu systems are negative, except as otherwise noted.    OBJECTIVE:     /79 (BP Location: Left arm, Patient Position: Sitting, Cuff Size: Adult Regular)  Pulse 76  Temp 99.7  F (37.6  C) (Temporal)  Wt 172 lb 12.8 oz (78.4 kg)  BMI 26.86 kg/m2  Body mass index is 26.86 kg/(m^2).  EXAM:   CONSTITUTIONAL: mildly ill appearing, alert and no distress  EYES: Eyes grossly normal to inspection, PERRL and conjunctivae and sclerae normal  HEENT: Head normocephalic. Neck supple,  no adenopathy, right canal with mod amt dried cerumen, TM's normal bilaterally, no sinus tenderness,  clear rhinorrhea, mucous membranes moist. No tonsillar hypertrophy, pharynx slightly erythematous without exudate, swabbed samples collected and sent to lab  CV: S1 normal, S2 normal, no murmurs, clicks, or gallops and extremities warm  RESP: normal respiratory  rate and rhythm, lungs clear to auscultation  GI: Abdomen soft, non-tender. BS normal. No masses, organomegaly  Skin: no suspicious lesions or rashes, good skin turgor  Neurologic: strength and tone- normal, sensory exam- grossly normal, mentation appears normal     Diagnostic Test Results:  Results for orders placed or performed in visit on 05/02/18 (from the past 24 hour(s))   Strep, Rapid Screen   Result Value Ref Range    Specimen Description Throat     Rapid Strep A Screen       NEGATIVE: No Group A streptococcal antigen detected by immunoassay, await culture report.       ASSESSMENT/PLAN:         ICD-10-CM    1. Throat pain R07.0 Strep, Rapid Screen     Beta strep group A culture   2. Common cold virus J00    Discussed viral versus bacterial illnesses along with typical course of progression, and no signs or symptoms of bacterial infection at this point. Culture pending   Debrox for right ear and follow up if not improving or getting worse and would wash out at that time  Symptom management: gargle salt water, honey in tea or warm water, plenty of rest and extra fluids. Reviewed signs of dehydration. See patient instructions     Patient Instructions   Drink plenty of extra fluids, honey soothes the cough (honey and lemon in tea is great), and cold fluids help keep swelling down.     Gargle salt water a few times a day, some studies show this can kill the virus sooner. Chicken noodle soup, and good nutrition in small amounts. Vitamin C and Zinc can help boost your immune system and can be taken short term while you fight this off. Extra fluids and rest also help your body fight off the virus.     Do saline washes with neti pot or saline nasal spray a few times throughout the day    Over the counter decongestants you can try:   Oxymetazolone nasal spray  (Afrin or generic is fine):  two sprays twice daily for 3-4 days then stop to avoid rebound congestion.    OR  Pseudoephedrine: 30mg three times daily by mouth  for 1-2 weeks. You need to go to the Pharmacist counter and show your ID to get this medication. Do not take if you have high blood pressure, and this medication may also keep you awake.     I would also recommend to thin mucous:  Guaifenesin regularly until you are better    For pain and discomfort:  Ibuprofen 400 - 600mg three times daily for 5-7 days (anti-inflammatory) to decrease swelling and help with pain of sinuses, nose, throat and chest. If you have Diabetes or Kidney issues, do not take this or any other NSAID     Return to clinic at any time if you develop high fevers, if worsening or in 1-2 weeks if not improving.          SHIRA Kerr [facility]

## 2018-05-03 LAB
BACTERIA SPEC CULT: NORMAL
SPECIMEN SOURCE: NORMAL

## 2018-06-17 ENCOUNTER — HOSPITAL ENCOUNTER (EMERGENCY)
Facility: CLINIC | Age: 38
Discharge: HOME | End: 2018-06-18
Attending: EMERGENCY MEDICINE | Admitting: EMERGENCY MEDICINE
Payer: COMMERCIAL

## 2018-06-17 DIAGNOSIS — R10.84 ABDOMINAL PAIN, GENERALIZED: ICD-10-CM

## 2018-06-17 LAB
ALBUMIN SERPL-MCNC: 4.4 G/DL (ref 3.4–5)
ALBUMIN UR-MCNC: 10 MG/DL
ALP SERPL-CCNC: 105 U/L (ref 40–150)
ALT SERPL W P-5'-P-CCNC: 55 U/L (ref 0–70)
ANION GAP SERPL CALCULATED.3IONS-SCNC: 9 MMOL/L (ref 3–14)
APPEARANCE UR: CLEAR
AST SERPL W P-5'-P-CCNC: 31 U/L (ref 0–45)
BACTERIA #/AREA URNS HPF: ABNORMAL /HPF
BASOPHILS # BLD AUTO: 0 10E9/L (ref 0–0.2)
BASOPHILS NFR BLD AUTO: 0.4 %
BILIRUB SERPL-MCNC: 1.7 MG/DL (ref 0.2–1.3)
BILIRUB UR QL STRIP: NEGATIVE
BUN SERPL-MCNC: 14 MG/DL (ref 7–30)
CALCIUM SERPL-MCNC: 9.4 MG/DL (ref 8.5–10.1)
CHLORIDE SERPL-SCNC: 104 MMOL/L (ref 94–109)
CO2 SERPL-SCNC: 25 MMOL/L (ref 20–32)
COLOR UR AUTO: YELLOW
CREAT SERPL-MCNC: 0.73 MG/DL (ref 0.66–1.25)
DIFFERENTIAL METHOD BLD: NORMAL
EOSINOPHIL # BLD AUTO: 0 10E9/L (ref 0–0.7)
EOSINOPHIL NFR BLD AUTO: 0.1 %
ERYTHROCYTE [DISTWIDTH] IN BLOOD BY AUTOMATED COUNT: 11.7 % (ref 10–15)
GFR SERPL CREATININE-BSD FRML MDRD: >90 ML/MIN/1.7M2
GLUCOSE SERPL-MCNC: 112 MG/DL (ref 70–99)
GLUCOSE UR STRIP-MCNC: NEGATIVE MG/DL
HCT VFR BLD AUTO: 46.6 % (ref 40–53)
HGB BLD-MCNC: 16.5 G/DL (ref 13.3–17.7)
HGB UR QL STRIP: NEGATIVE
IMM GRANULOCYTES # BLD: 0 10E9/L (ref 0–0.4)
IMM GRANULOCYTES NFR BLD: 0.3 %
KETONES UR STRIP-MCNC: 10 MG/DL
LACTATE BLD-SCNC: 1.3 MMOL/L (ref 0.7–2)
LEUKOCYTE ESTERASE UR QL STRIP: ABNORMAL
LIPASE SERPL-CCNC: 136 U/L (ref 73–393)
LYMPHOCYTES # BLD AUTO: 0.9 10E9/L (ref 0.8–5.3)
LYMPHOCYTES NFR BLD AUTO: 8.8 %
MCH RBC QN AUTO: 30.4 PG (ref 26.5–33)
MCHC RBC AUTO-ENTMCNC: 35.4 G/DL (ref 31.5–36.5)
MCV RBC AUTO: 86 FL (ref 78–100)
MONOCYTES # BLD AUTO: 1.1 10E9/L (ref 0–1.3)
MONOCYTES NFR BLD AUTO: 11.1 %
MUCOUS THREADS #/AREA URNS LPF: PRESENT /LPF
NEUTROPHILS # BLD AUTO: 7.9 10E9/L (ref 1.6–8.3)
NEUTROPHILS NFR BLD AUTO: 79.3 %
NITRATE UR QL: NEGATIVE
NRBC # BLD AUTO: 0 10*3/UL
NRBC BLD AUTO-RTO: 0 /100
PH UR STRIP: 5.5 PH (ref 5–7)
PLATELET # BLD AUTO: 317 10E9/L (ref 150–450)
POTASSIUM SERPL-SCNC: 3.8 MMOL/L (ref 3.4–5.3)
PROT SERPL-MCNC: 8 G/DL (ref 6.8–8.8)
RBC # BLD AUTO: 5.43 10E12/L (ref 4.4–5.9)
RBC #/AREA URNS AUTO: 3 /HPF (ref 0–2)
SODIUM SERPL-SCNC: 138 MMOL/L (ref 133–144)
SOURCE: ABNORMAL
SP GR UR STRIP: 1.02 (ref 1–1.03)
SQUAMOUS #/AREA URNS AUTO: 1 /HPF (ref 0–1)
TRANS CELLS #/AREA URNS HPF: <1 /HPF (ref 0–1)
UROBILINOGEN UR STRIP-MCNC: NORMAL MG/DL (ref 0–2)
WBC # BLD AUTO: 10 10E9/L (ref 4–11)
WBC #/AREA URNS AUTO: 12 /HPF (ref 0–5)

## 2018-06-17 PROCEDURE — 99284 EMERGENCY DEPT VISIT MOD MDM: CPT | Mod: Z6 | Performed by: EMERGENCY MEDICINE

## 2018-06-17 PROCEDURE — 87086 URINE CULTURE/COLONY COUNT: CPT | Performed by: EMERGENCY MEDICINE

## 2018-06-17 PROCEDURE — 99284 EMERGENCY DEPT VISIT MOD MDM: CPT | Mod: 25 | Performed by: EMERGENCY MEDICINE

## 2018-06-17 PROCEDURE — 81001 URINALYSIS AUTO W/SCOPE: CPT | Performed by: EMERGENCY MEDICINE

## 2018-06-17 PROCEDURE — 80053 COMPREHEN METABOLIC PANEL: CPT | Performed by: EMERGENCY MEDICINE

## 2018-06-17 PROCEDURE — 83605 ASSAY OF LACTIC ACID: CPT | Performed by: EMERGENCY MEDICINE

## 2018-06-17 PROCEDURE — 96365 THER/PROPH/DIAG IV INF INIT: CPT | Mod: 59 | Performed by: EMERGENCY MEDICINE

## 2018-06-17 PROCEDURE — 83690 ASSAY OF LIPASE: CPT | Performed by: EMERGENCY MEDICINE

## 2018-06-17 PROCEDURE — 85025 COMPLETE CBC W/AUTO DIFF WBC: CPT | Performed by: EMERGENCY MEDICINE

## 2018-06-17 NOTE — ED AVS SNAPSHOT
[facility], Emergency Department    [address]    [place] [address]    Phone:  [phone]    Fax:  [phone]                                       Mehran Ibarra   MRN: [id number]    Department:  [facility], Emergency Department   Date of Visit:  6/17/2018           Patient Information     Date Of Birth          1980        Your diagnoses for this visit were:     None       You were seen by Lyssa Ziegler MD.      Follow-up Information     Follow up with Arpan Flores MD.    Specialty:  Family Practice    Contact information:    [address]  [facility] [address] [phone]          Discharge Instructions       FOLLOW-UP:  Please make an appointment to follow up with:  - Your Primary Care Provider in 2 days to ensure you're getting better. Return to the Emergency Department with any new/worsening symptoms.   - Your urine culture is pending. If it returns positive we will call you to start antibiotics.     OTHER INSTRUCTIONS:  - Do your best to stay hydrated.     RETURN TO THE EMERGENCY DEPARTMENT  Return to the Emergency Department at any time for new/worsening symptoms.       Abdominal Pain    Abdominal pain is pain in the stomach or belly area. Everyone has this pain from time to time. In many cases it goes away on its own. But abdominal pain can sometimes be due to a serious problem, such as appendicitis. So it s important to know when to seek help.  Causes of abdominal pain  There are many possible causes of abdominal pain. Common causes in adults include:    Constipation, diarrhea, or gas    Stomach acid flowing back up into the esophagus (acid reflux or heartburn)    Severe acid reflux, called GERD (gastroesophageal reflux disease)    A sore in the lining of the stomach or small intestine (like what we may be seeing on your CT scan tonight)    Inflammation of the gallbladder, liver, or pancreas    Gallstones or kidney  stones    Appendicitis     Intestinal blockage     An internal organ pushing through a muscle or other tissue (hernia)    Urinary tract infections    In women, menstrual cramps, fibroids, or endometriosis    Inflammation or infection of the intestines  Diagnosing the cause of abdominal pain  Your healthcare provider will do a physical exam help find the cause of your pain. If needed, tests will be ordered. Belly pain has many possible causes. So it can be hard to find the reason for your pain. Giving details about your pain can help. Tell your provider where and when you feel the pain, and what makes it better or worse. Also let your provider know if you have other symptoms such as:    Fever    Tiredness    Upset stomach (nausea)    Vomiting    Changes in bathroom habits  Treating abdominal pain  Some causes of pain need emergency medical treatment right away. These include appendicitis or a bowel blockage. Other problems can be treated with rest, fluids, or medicines. Your healthcare provider can give you specific instructions for treatment or self-care based on what is causing your pain.  If you have vomiting or diarrhea, sip water or other clear fluids. When you are ready to eat solid foods again, start with small amounts of easy-to-digest, low-fat foods. These include apple sauce, toast, or crackers.   When to seek medical care  Call 911 or go to the hospital right away if you:    Can t pass stool and are vomiting    Are vomiting blood or have bloody diarrhea or black, tarry diarrhea    Have chest, neck, or shoulder pain    Feel like you might pass out    Have pain in your shoulder blades with nausea    Have sudden, severe belly pain    Have new, severe pain unlike any you have felt before    Have a belly that is rigid, hard, and tender to touch  Call your healthcare provider if you have:    Pain for more than 5 days    Bloating for more than 2 days    Diarrhea for more than 5 days    A fever of 100.4 F (38 C)  or higher, or as directed by your healthcare provider    Pain that gets worse    Weight loss for no reason    Continued lack of appetite    Blood in your stool  How to prevent abdominal pain  Here are some tips to help prevent abdominal pain:    Eat smaller amounts of food at one time.    Avoid greasy, fried, or other high-fat foods.    Avoid foods that give you gas.    Exercise regularly.    Drink plenty of fluids.  To help prevent GERD symptoms:    Quit smoking.    Reduce alcohol and certain foods that increase stomach acid.    Avoid aspirin and over-the-counter pain and fever medicines (NSAIDS or nonsteroidal anti-inflammatory drugs), if possible    Lose extra weight.    Finish eating at least 2 hours before you go to bed or lie down.    Raise the head of your bed.  Date Last Reviewed: 7/1/2016 2000-2017 [employer]. [address] [place] [address], [place] [address]. All rights reserved. This information is not intended as a substitute for professional medical care. Always follow your healthcare professional's instructions.            24 Hour Appointment Hotline       To make an appointment at any [facility], call [phone] ([phone]). If you don't have a family doctor or clinic, we will help you find one. [facility] are conveniently located to serve the needs of you and your family.             Review of your medicines      Our records show that you are taking the medicines listed below. If these are incorrect, please call your family doctor or clinic.        Dose / Directions Last dose taken    amphetamine-dextroamphetamine 5 MG per tablet   Commonly known as:  ADDERALL   Dose:  5 mg   Quantity:  5 tablet        Take 1 tablet (5 mg) by mouth daily   Refills:  0        citalopram 10 MG tablet   Commonly known as:  celeXA   Dose:  10 mg   Quantity:  5 tablet        Take 1 tablet (10 mg) by mouth daily   Refills:  0        guanFACINE 1 MG tablet   Commonly known as:  TENEX   Dose:  2  "mg   Quantity:  10 tablet        Take 2 tablets (2 mg) by mouth At Bedtime   Refills:  0        ketoconazole 2 % cream   Commonly known as:  NIZORAL   Quantity:  15 g        Apply topically 2 times daily   Refills:  1                Procedures and tests performed during your visit     CBC with platelets differential    CT Abdomen Pelvis w Contrast    Comprehensive metabolic panel    Lactic acid whole blood    Lipase    UA with Microscopic    Urine Culture      Orders Needing Specimen Collection     None      Pending Results     Date and Time Order Name Status Description    6/18/2018 0053 Urine Culture In process     6/18/2018 0006 CT Abdomen Pelvis w Contrast Preliminary             Pending Culture Results     Date and Time Order Name Status Description    6/18/2018 0053 Urine Culture In process             Pending Results Instructions     If you had any lab results that were not finalized at the time of your Discharge, you can call the ED Lab Result RN at [phone]. You will be contacted by this team for any positive Lab results or changes in treatment. The nurses are available 7 days a week from 10A to 6:30P.  You can leave a message 24 hours per day and they will return your call.        Thank you for choosing [facility]       Thank you for choosing [facility] for your care. Our goal is always to provide you with excellent care. Hearing back from our patients is one way we can continue to improve our services. Please take a few minutes to complete the written survey that you may receive in the mail after you visit with us. Thank you!        [employer] Information     [employer] lets you send messages to your doctor, view your test results, renew your prescriptions, schedule appointments and more. To sign up, go to [email or web address] . Click on \"Log in\" on the left side of the screen, which will take you to the Welcome page. Then click on \"Sign up Now\" on the right side of the page.     You will be asked to enter " the access code listed below, as well as some personal information. Please follow the directions to create your username and password.     Your access code is: [email or web address]  Expires: 2018 10:07 AM     Your access code will  in 90 days. If you need help or a new code, please call your [facility] clinic or [phone].        Care EveryWhere ID     This is your Care EveryWhere ID. This could be used by other organizations to access your [facility] medical records  [id number]        Equal Access to Services     [facility]: Liv Xiong, mell [place], vale ericksonaltommy malloy, nichole mcmillan . So [facility] [phone].    ATENCIÓN: Si habla español, tiene a english disposición servicios gratuitos de asistencia lingüística. Llame al [phone].    We comply with applicable federal civil rights laws and [place] laws. We do not discriminate on the basis of race, color, national origin, age, disability, sex, sexual orientation, or gender identity.            After Visit Summary       This is your record. Keep this with you and show to your community pharmacist(s) and doctor(s) at your next visit.

## 2018-06-17 NOTE — ED AVS SNAPSHOT
[facility], [place], Emergency Department    [address]    [place] [address]    Phone:  [phone]    Fax:  [phone]                                       Mehran Ibarra   MRN: [id number]    Department:  [facility], Emergency Department   Date of Visit:  6/17/2018           After Visit Summary Signature Page     I have received my discharge instructions, and my questions have been answered. I have discussed any challenges I see with this plan with the nurse or doctor.    ..........................................................................................................................................  Patient/Patient Representative Signature      ..........................................................................................................................................  Patient Representative Print Name and Relationship to Patient    ..................................................               ................................................  Date                                            Time    ..........................................................................................................................................  Reviewed by Signature/Title    ...................................................              ..............................................  Date                                                            Time

## 2018-06-18 ENCOUNTER — APPOINTMENT (OUTPATIENT)
Dept: CT IMAGING | Facility: CLINIC | Age: 38
End: 2018-06-18
Attending: EMERGENCY MEDICINE
Payer: COMMERCIAL

## 2018-06-18 ENCOUNTER — TELEPHONE (OUTPATIENT)
Dept: FAMILY MEDICINE | Facility: CLINIC | Age: 38
End: 2018-06-18

## 2018-06-18 VITALS
DIASTOLIC BLOOD PRESSURE: 87 MMHG | BODY MASS INDEX: 26.94 KG/M2 | RESPIRATION RATE: 18 BRPM | SYSTOLIC BLOOD PRESSURE: 135 MMHG | TEMPERATURE: 98.8 F | OXYGEN SATURATION: 94 % | WEIGHT: 173.3 LBS

## 2018-06-18 PROCEDURE — 25000128 H RX IP 250 OP 636: Performed by: EMERGENCY MEDICINE

## 2018-06-18 PROCEDURE — 25000125 ZZHC RX 250: Performed by: EMERGENCY MEDICINE

## 2018-06-18 PROCEDURE — 74177 CT ABD & PELVIS W/CONTRAST: CPT

## 2018-06-18 RX ORDER — SODIUM CHLORIDE 9 MG/ML
INJECTION, SOLUTION INTRAVENOUS CONTINUOUS
Status: DISCONTINUED | OUTPATIENT
Start: 2018-06-18 | End: 2018-06-18 | Stop reason: HOSPADM

## 2018-06-18 RX ORDER — CEFTRIAXONE 1 G/1
1 INJECTION, POWDER, FOR SOLUTION INTRAMUSCULAR; INTRAVENOUS ONCE
Status: COMPLETED | OUTPATIENT
Start: 2018-06-18 | End: 2018-06-18

## 2018-06-18 RX ORDER — IOPAMIDOL 755 MG/ML
100 INJECTION, SOLUTION INTRAVASCULAR ONCE
Status: COMPLETED | OUTPATIENT
Start: 2018-06-18 | End: 2018-06-18

## 2018-06-18 RX ADMIN — IOPAMIDOL 84 ML: 755 INJECTION, SOLUTION INTRAVENOUS at 00:43

## 2018-06-18 RX ADMIN — CEFTRIAXONE SODIUM 1 G: 1 INJECTION, POWDER, FOR SOLUTION INTRAMUSCULAR; INTRAVENOUS at 01:06

## 2018-06-18 RX ADMIN — SODIUM CHLORIDE: 9 INJECTION, SOLUTION INTRAVENOUS at 01:04

## 2018-06-18 RX ADMIN — SODIUM CHLORIDE 61 ML: 9 INJECTION, SOLUTION INTRAVENOUS at 00:43

## 2018-06-18 ASSESSMENT — ENCOUNTER SYMPTOMS
BRUISES/BLEEDS EASILY: 0
BACK PAIN: 0
ANAL BLEEDING: 0
NECK PAIN: 0
EYE PAIN: 0
HEADACHES: 0
CHILLS: 0
DIZZINESS: 0
PALPITATIONS: 0
NERVOUS/ANXIOUS: 0
FEVER: 0
DYSURIA: 0
DYSPHORIC MOOD: 0
COUGH: 0
DIAPHORESIS: 0
SORE THROAT: 0
VOICE CHANGE: 0
SHORTNESS OF BREATH: 0
NAUSEA: 1
ADENOPATHY: 0
BLOOD IN STOOL: 0
LIGHT-HEADEDNESS: 0
ABDOMINAL PAIN: 1
HEMATURIA: 0
DIARRHEA: 1
POLYDIPSIA: 0
COLOR CHANGE: 0
VOMITING: 0
NUMBNESS: 0
WEAKNESS: 0
CONSTIPATION: 0
TROUBLE SWALLOWING: 0
FREQUENCY: 0

## 2018-06-18 NOTE — TELEPHONE ENCOUNTER
Mehran's Mother calling requesting an ER fu appointment for Wednesday with . Pt was seen at the [facility] on 6/17. Please call Kay to advise at [phone] or Mobile ok to LM. Thank You.    Graciela Branch, [facility]  Diabetes and Nutrition Scheduling

## 2018-06-18 NOTE — TELEPHONE ENCOUNTER
Called mother, Kay, scheduled patient for 10:45 am on 06/20/18.    Margarita Kennedy RN  [facility]

## 2018-06-18 NOTE — ED PROVIDER NOTES
History     Chief Complaint   Patient presents with     Abdominal Pain     HPI  Mehran Ibarra is a 38 year old male with a history of global developmental delay, IBS, previous epigastric discomfort/gastritis/GERD, anxiety who is presenting with intermittent abdominal discomfort that is worse on the left side.     Patient was unable to answer how long episodes of pain last, but says it has been somewhat waxing and waning. Patient state he is experiencing nausea but denies vomiting. Patient states his bowel movements are a little more loose than usual with a yellow color and he denies any blood in bowel. Patient also denies any pain w/ urination or blood in urine.  Frequency or urgency. Patient denies fever, headache, neck pain or stiffness.  No falls or accidents.  No lifting injuries or twisting mechanisms.  No bowel or bladder incontinence.  No numbness, tingling or focal weakness.  No chest pain or breathing symptoms.  No palpitations.  No recent antibiotics, potentially contaminated water, international travel, etc.    No previous abdominal surgeries. Patient states the pain he is experiencing now is different from the pain he has had associated with acid reflux.  No history of kidney stones.    No other new symptoms or complaints at this time.  Please see ROS for further details.    I have reviewed the Medications, Allergies, Past Medical and Surgical History, and Social History in the Epic system.    Review of Systems   Constitutional: Negative for chills, diaphoresis and fever.   HENT: Negative for ear pain, sore throat, tinnitus, trouble swallowing and voice change.    Eyes: Negative for pain and visual disturbance.   Respiratory: Negative for cough and shortness of breath.    Cardiovascular: Negative for chest pain, palpitations and leg swelling.   Gastrointestinal: Positive for abdominal pain (left sided), diarrhea (looser and yellow in color) and nausea. Negative for anal bleeding, blood in stool,  constipation and vomiting.   Endocrine: Negative for polydipsia and polyuria.   Genitourinary: Negative for dysuria, frequency, hematuria and urgency.   Musculoskeletal: Negative for back pain and neck pain.   Skin: Negative for color change and rash.   Allergic/Immunologic: Negative for immunocompromised state.   Neurological: Negative for dizziness, weakness, light-headedness, numbness and headaches.   Hematological: Negative for adenopathy. Does not bruise/bleed easily.   Psychiatric/Behavioral: Negative for dysphoric mood. The patient is not nervous/anxious.        Physical Exam   BP: (!) 151/100  Heart Rate: 93  Temp: 97.5  F (36.4  C)  Weight: 78.6 kg (173 lb 4.8 oz)  SpO2: 95 %      Physical Exam  CONSTITUTIONAL: Well-developed and well-nourished. Awake and alert. Non-toxic appearance. No acute distress.   HENT:   - Head: Normocephalic and atraumatic.   - Ears: Hearing and external ear grossly normal.   - Nose: Nose normal. No rhinorrhea. No epistaxis.   - Mouth/Throat: MMM  EYES: Conjunctivae and lids are normal. No scleral icterus.   NECK: Normal range of motion and phonation normal. Neck supple.  No tracheal deviation, no stridor. No edema or erythema noted.  CARDIOVASCULAR: Normal rate, regular rhythm and no appreciable abnormal heart sounds.  PULMONARY/CHEST: Normal work of breathing. No accessory muscle usage or stridor. No respiratory distress.  No appreciable abnormal breath sounds.  ABDOMEN: Soft, non-distended. Some left sided abd tenderness. No peritoneal findings, no rigidity, rebound or guarding.   MUSCULOSKELETAL: Extremities warm and seemingly well perfused.   NEUROLOGIC: Awake, alert. Not disoriented. No seizure activity. GCS 15  SKIN: Skin is warm and dry. No rash noted. No diaphoresis. No pallor.   PSYCHIATRIC: Normal mood and affect. Speech and behavior normal. Thought processes linear. Cognition and memory are normal.     ED Course     ED Course   Value Comment Time   WBC Urine: (!) 12  (Reviewed) 06/18 0000   Leukocyte Esterase Urine: (!) Large (Reviewed) 06/18 0000   Nitrite Urine: Negative (Reviewed) 06/18 0000   RBC Urine: (!) 3 (Reviewed) 06/18 0000   Bacteria Urine: (!) Many (Reviewed) 06/18 0000   Mucous Urine: (!) Present (Reviewed) 06/18 0000   Sodium: 138 (Reviewed) 06/18 0000   Potassium: 3.8 (Reviewed) 06/18 0000   Urea Nitrogen: 14 (Reviewed) 06/18 0000   Creatinine: 0.73 (Reviewed) 06/18 0000   WBC: 10.0 (Reviewed) 06/18 0000   Hemoglobin: 16.5 (Reviewed) 06/18 0000     Procedures               Labs Ordered and Resulted from Time of ED Arrival Up to the Time of Departure from the ED - No data to display         Assessments & Plan (with Medical Decision Making)   IMPRESSION: Mehran Ibarra is a 38 year old male presenting with his mother w/ PMH notable for global developmental delay, IBS, previous epigastric discomfort/gastritis/GERD, anxiety who is presenting with intermittent abdominal discomfort, worse on the left as described further above in HPI/ROS.  Clinically, patient appears nontoxic, NAD.  PE is somewhat elevated though I see that he has had elevated BPs in the past to some degree as well.  On examination he does have some left-sided abdominal discomfort though not significant and no violetta peritoneal findings.  Mild left flank pain as well, no violetta bony or rib tenderness.     DDX includes, but not limited to, UTI/pyelonephritis, constipation or other colon cause, less likely kidney stone etc.    PLAN: Laboratory studies, urine studies, me and on abdominal imaging especially given some limitation in being able to obtain a clear history from the patient in the setting of his known developmental delay.    RESULTS:  See ED Course section above for particular pertinent findings and comments  - Labs: No acute findings on CBC or CMP, lipase, or lactate.  - Urine: May have possible UTI with 12 WBCs, 3 RBCs, large LE urine culture added on  - Imaging: I have personally reviewed and  interpreted the radiologic images, and have reviewed and agree with written preliminary reports:  -- CT: Possible ileitis, formal pending    INTERVENTIONS:   -  IV fluids, IV ceftriaxone    RE-EVALUATION:  - The patient's symptoms were improving during ED stay.  - Pt continues to do well here in the ED, no acute issues or apparent concerning changes in vitals or clinical appearance.     DISCUSSIONS:  - w/ Patient and his mother: I have reviewed the available findings, tentative plan, need for follow up, and strict return instructions with the patient and his mother. They expressed understanding and agreement with this plan. All questions answered to the best of our ability at this time.     SIGNOUT:  - Patient signed out to overnight EM MD.  - Impression at shift change: Abdominal pain, ileitis  - Pending at shift change: Clinical reassessment after fluids  - Tentative plan: Reassess after IVF  --- Pending: Urine culture  --- Other recommendation: Repeat chest scan in 6 months for incidental findings (discussed w/ patient and his mother, they expressed understanding and will arrange to be performed w/ PCP in ~ 6 months).       ______________________________________________________________________________        New Prescriptions    No medications on file       Final diagnoses:   None     ISalvador, am serving as a trained medical scribe to document services personally performed by Lyssa Ziegler MD, based on the provider's statements to me.   ILyssa MD, was physically present and have reviewed and verified the accuracy of this note documented by Salvador Macario.    6/17/2018   [facility], [place], EMERGENCY DEPARTMENT     Lyssa Ziegler MD  06/20/18 1826

## 2018-06-18 NOTE — DISCHARGE INSTRUCTIONS
FOLLOW-UP:  Please make an appointment to follow up with:  - Your Primary Care Provider in 2 days to ensure you're getting better. Return to the Emergency Department with any new/worsening symptoms.   - Your urine culture is pending. If it returns positive we will call you to start antibiotics.     OTHER INSTRUCTIONS:  - Do your best to stay hydrated.     RETURN TO THE EMERGENCY DEPARTMENT  Return to the Emergency Department at any time for new/worsening symptoms.       Abdominal Pain    Abdominal pain is pain in the stomach or belly area. Everyone has this pain from time to time. In many cases it goes away on its own. But abdominal pain can sometimes be due to a serious problem, such as appendicitis. So it s important to know when to seek help.  Causes of abdominal pain  There are many possible causes of abdominal pain. Common causes in adults include:    Constipation, diarrhea, or gas    Stomach acid flowing back up into the esophagus (acid reflux or heartburn)    Severe acid reflux, called GERD (gastroesophageal reflux disease)    A sore in the lining of the stomach or small intestine (like what we may be seeing on your CT scan tonight)    Inflammation of the gallbladder, liver, or pancreas    Gallstones or kidney stones    Appendicitis     Intestinal blockage     An internal organ pushing through a muscle or other tissue (hernia)    Urinary tract infections    In women, menstrual cramps, fibroids, or endometriosis    Inflammation or infection of the intestines  Diagnosing the cause of abdominal pain  Your healthcare provider will do a physical exam help find the cause of your pain. If needed, tests will be ordered. Belly pain has many possible causes. So it can be hard to find the reason for your pain. Giving details about your pain can help. Tell your provider where and when you feel the pain, and what makes it better or worse. Also let your provider know if you have other symptoms such  as:    Fever    Tiredness    Upset stomach (nausea)    Vomiting    Changes in bathroom habits  Treating abdominal pain  Some causes of pain need emergency medical treatment right away. These include appendicitis or a bowel blockage. Other problems can be treated with rest, fluids, or medicines. Your healthcare provider can give you specific instructions for treatment or self-care based on what is causing your pain.  If you have vomiting or diarrhea, sip water or other clear fluids. When you are ready to eat solid foods again, start with small amounts of easy-to-digest, low-fat foods. These include apple sauce, toast, or crackers.   When to seek medical care  Call 911 or go to the hospital right away if you:    Can t pass stool and are vomiting    Are vomiting blood or have bloody diarrhea or black, tarry diarrhea    Have chest, neck, or shoulder pain    Feel like you might pass out    Have pain in your shoulder blades with nausea    Have sudden, severe belly pain    Have new, severe pain unlike any you have felt before    Have a belly that is rigid, hard, and tender to touch  Call your healthcare provider if you have:    Pain for more than 5 days    Bloating for more than 2 days    Diarrhea for more than 5 days    A fever of 100.4 F (38 C) or higher, or as directed by your healthcare provider    Pain that gets worse    Weight loss for no reason    Continued lack of appetite    Blood in your stool  How to prevent abdominal pain  Here are some tips to help prevent abdominal pain:    Eat smaller amounts of food at one time.    Avoid greasy, fried, or other high-fat foods.    Avoid foods that give you gas.    Exercise regularly.    Drink plenty of fluids.  To help prevent GERD symptoms:    Quit smoking.    Reduce alcohol and certain foods that increase stomach acid.    Avoid aspirin and over-the-counter pain and fever medicines (NSAIDS or nonsteroidal anti-inflammatory drugs), if possible    Lose extra weight.    Finish  eating at least 2 hours before you go to bed or lie down.    Raise the head of your bed.  Date Last Reviewed: 7/1/2016 2000-2017 [employer]. [address] [place] [address], [place] [address]. All rights reserved. This information is not intended as a substitute for professional medical care. Always follow your healthcare professional's instructions.

## 2018-06-18 NOTE — ED TRIAGE NOTES
Started this morning with  left sided abd. pain and nausea.  Pain is intermittent.  No vomiting.  States had 4 loose stools today.,

## 2018-06-19 LAB
BACTERIA SPEC CULT: NO GROWTH
Lab: NORMAL
SPECIMEN SOURCE: NORMAL

## 2018-06-19 NOTE — PROGRESS NOTES
SUBJECTIVE:   Rosalinda Ibarra is a 38 year old male who presents to clinic today for the following health issues:    ABDOMINAL PAIN   6/17/18 Monday morning mom noticed that rosalinda was having life side stomach that was painful to the touch. She decided to take rosalinda to the ER where they performed a urinalysis and CT. The results showed that he had a UTI as well as irritation in his small intestine. Saline drip with antibiotic was administered. While in ER Rosalinda had 2 loose BM. The following morning, 6/18/18 Rosalinda began having explosive BM, diarrhea and some anal leaking accompanied by intermittent urinary incontinence. Rosalinda began using Depends dippers. Recently noticed a rash on his inner thigh and along the upper and lower bands of where the Depends touched his skin. Will not resolve. By 6/19/18 was doing much better. Had a BM this morning.     Onset: Sunday morning, still has it but it is not nearly as bad as it was     Description:   Character: Dull ache  Location: left lower quadrant  Radiation: None    Intensity: mild    Progression of Symptoms:  improving    Accompanying Signs & Symptoms:  Fever/Chills?: no   Gas/Bloating: no  Nausea: YES- Sunday  Vomitting: no   Diarrhea?: YES- Sunday and Monday. Has a rash from the depends he was wearing. Would like this looked at today  Constipation:no   Dysuria or Hematuria: no    History:   Trauma: no   Previous similar pain: no    Previous tests done: CT    Precipitating factors:   Does the pain change with:     Food: no      BM: no     Urination: no     Alleviating factors:      Therapies Tried and outcome: Antibiotic through an IV    LMP:  not applicable     Was told he had a UTI from the urine he cultured. Was seen in [place] ER       Problem list and histories reviewed & adjusted, as indicated.  Additional history: as documented    Patient Active Problem List   Diagnosis     GERD (gastroesophageal reflux disease)     IBS (irritable bowel syndrome)     Pectus  excavatum     Attention deficit disorder     Generalized anxiety disorder     CARDIOVASCULAR SCREENING; LDL GOAL LESS THAN 160     Global developmental delay     Seborrheic dermatitis     Localized bacterial skin infection     Atypical nevus of back     Elevated blood pressure reading without diagnosis of hypertension     Bunion of great toe of left foot     Epithelial cyst     Left ventricular hypertrophy     Scolioses     Impulse control disorder     Intellectual disability     Tobacco dependence in remission     Past Surgical History:   Procedure Laterality Date     HERNIA REPAIR, INGUINAL RT/LT      Hernia Repair, RT/LT     ingrown toenail       THORACOSCOPIC REPAIR PECTUS EXCAVATUM CHILD ([place])       tooth removal         Social History   Substance Use Topics     Smoking status: Former Smoker     Packs/day: 0.02     Years: 10.00     Types: Cigarettes, Cigars     Quit date: 2/28/2013     Smokeless tobacco: Never Used     Alcohol use No     Family History   Problem Relation Age of Onset     Hypertension Father      Cardiovascular Mother      HEART DISEASE Maternal Grandmother      Diabetes Paternal Grandfather          Current Outpatient Prescriptions   Medication Sig Dispense Refill     amphetamine-dextroamphetamine (ADDERALL) 5 MG tablet Take 1 tablet (5 mg) by mouth daily 5 tablet 0     citalopram (CELEXA) 10 MG tablet Take 1 tablet (10 mg) by mouth daily 5 tablet 0     guanFACINE (TENEX) 1 MG tablet Take 2 tablets (2 mg) by mouth At Bedtime (Patient taking differently: Take 1 mg by mouth At Bedtime ) 10 tablet 0     ketoconazole (NIZORAL) 2 % cream Apply topically 2 times daily as needed for itching 30 g 1     No Known Allergies  BP Readings from Last 3 Encounters:   06/20/18 114/70   06/18/18 135/87   05/02/18 141/79    Wt Readings from Last 3 Encounters:   06/20/18 78.2 kg (172 lb 4.8 oz)   06/17/18 78.6 kg (173 lb 4.8 oz)   05/02/18 78.4 kg (172 lb 12.8 oz)                  Labs reviewed in  EPIC    Reviewed and updated as needed this visit by clinical staff        FHx: No history of diabetes   MHx : no kidney stones or infections     Reviewed and updated as needed this visit by Provider         ROS:  CONSTITUTIONAL: NEGATIVE for fever, chills, change in weight  INTEGUMENTARY/SKIN: NEGATIVE for worrisome rashes, moles or lesions  EYES: NEGATIVE for vision changes or irritation  ENT/MOUTH: NEGATIVE for ear, mouth and throat problems  RESP: NEGATIVE for significant cough or SOB  CV: NEGATIVE for chest pain, palpitations or peripheral edema  GI: NEGATIVE for nausea, abdominal pain, heartburn, or change in bowel habits, POSITIVE for anal sorness drom excessive BM  : NEGATIVE for frequency, dysuria, or hematuria  MUSCULOSKELETAL: NEGATIVE for significant arthralgias or myalgia  NEURO: NEGATIVE for weakness, dizziness or paresthesias  ENDOCRINE: NEGATIVE for temperature intolerance, skin/hair changes  HEME: NEGATIVE for bleeding problems  PSYCHIATRIC: NEGATIVE for changes in mood or affect    This document serves as a record of the services and decisions personally performed and made by Sheila Mcdonnell MD. It was created on her behalf by Naga Dalton, a trained medical scribe. The creation of this document is based on the provider's statements to the medical scribe.   Naga Dalton, 11:21 AM, June 20, 2018    OBJECTIVE:     /70  Pulse 72  Temp 97.2  F (36.2  C) (Oral)  Resp 18  Wt 78.2 kg (172 lb 4.8 oz)  SpO2 96%  BMI 26.79 kg/m2  Body mass index is 26.79 kg/(m^2).  GENERAL: healthy, alert and no distress, Presented with mother who did most of the ROS explination   NECK: no adenopathy, no asymmetry, masses, or scars and thyroid normal to palpation  RESP: lungs clear to auscultation - no rales, rhonchi or wheezes  CV: regular rate and rhythm, normal S1 S2, no S3 or S4, no murmur, click or rub, no peripheral edema and peripheral pulses strong  ABDOMEN: soft, no hepatosplenomegaly, no masses and  bowel sounds quiet but normal with percussible gas, abd slightly distended   MS: no gross musculoskeletal defects noted, no edema, no CVA tenderness  PSYCH: mentation appears normal, affect normal/bright    Diagnostic Test Results:  none     ASSESSMENT/PLAN:   (N30.00) Acute cystitis without hematuria  (primary encounter diagnosis)  Comment: Pain has subsided today   Plan: Continue to monitor, RTC if symptoms return    (R19.7) Diarrhea, unspecified type  Comment: Labs from ER showed very little signs of hematochezia. Diarrhea improved.  Plan: Continue to monitor     (L24.9) Irritant contact dermatitis, unspecified trigger  Comment: Use derm cream and hydrocortisone cream on wet skin. Twice daily.   Plan: If symptoms do not subside in 2 weeks, return to clinic.       Arpan Flores MD  [facility] [place]    The information in this document, created by a medical scribe for me, accurately reflects the services I personally performed and the decisions made by me. I have reviewed and approved this document for accuracy.  Dr. Arpan Flores, 1:05 PM, June 20, 2018

## 2018-06-20 ENCOUNTER — TELEPHONE (OUTPATIENT)
Dept: FAMILY MEDICINE | Facility: CLINIC | Age: 38
End: 2018-06-20

## 2018-06-20 ENCOUNTER — OFFICE VISIT (OUTPATIENT)
Dept: FAMILY MEDICINE | Facility: CLINIC | Age: 38
End: 2018-06-20
Payer: COMMERCIAL

## 2018-06-20 VITALS
DIASTOLIC BLOOD PRESSURE: 70 MMHG | OXYGEN SATURATION: 96 % | SYSTOLIC BLOOD PRESSURE: 114 MMHG | HEART RATE: 72 BPM | BODY MASS INDEX: 26.79 KG/M2 | RESPIRATION RATE: 18 BRPM | WEIGHT: 172.3 LBS | TEMPERATURE: 97.2 F

## 2018-06-20 DIAGNOSIS — L24.9 IRRITANT CONTACT DERMATITIS, UNSPECIFIED TRIGGER: ICD-10-CM

## 2018-06-20 DIAGNOSIS — R19.7 DIARRHEA, UNSPECIFIED TYPE: ICD-10-CM

## 2018-06-20 DIAGNOSIS — N30.00 ACUTE CYSTITIS WITHOUT HEMATURIA: Primary | ICD-10-CM

## 2018-06-20 DIAGNOSIS — R91.1 LUNG NODULE, SOLITARY: Primary | ICD-10-CM

## 2018-06-20 PROCEDURE — 99214 OFFICE O/P EST MOD 30 MIN: CPT | Performed by: FAMILY MEDICINE

## 2018-06-20 RX ORDER — KETOCONAZOLE 20 MG/G
CREAM TOPICAL 2 TIMES DAILY PRN
Qty: 30 G | Refills: 1 | Status: SHIPPED | OUTPATIENT
Start: 2018-06-20 | End: 2020-11-04

## 2018-06-20 NOTE — TELEPHONE ENCOUNTER
Reason for call:  Other   Patient called regarding (reason for call): call back  Additional comments: The patients mother called and stated he was seen in the er on 6/18 and had a CT scan done. They found he has a pulmonary nodule and wanted him to come back in 6 months to have another CT scan. She was not sure how she would go about getting one scheduled. She would like a call back to discuss how she can get another CT scan scheduled.     Phone number to reach patient:  Home number on file [phone] (home)    Best Time: Any    Can we leave a detailed message on this number?  YES

## 2018-06-20 NOTE — MR AVS SNAPSHOT
"              After Visit Summary   6/20/2018    Mehran Ibarra    MRN: [id number]           Patient Information     Date Of Birth          1980        Visit Information        Provider Department      6/20/2018 10:45 AM Arpan Flores MD [facility] [place] [facility]        Today's Diagnoses     Screening for HIV (human immunodeficiency virus)    -  1      Care Instructions    Rash care: Use derm cream and hydrocortisone cream on wet skin. Twice daily.   If symptoms do not subside in 2 weeks, return to clinic.           Follow-ups after your visit        Who to contact     If you have questions or need follow up information about today's clinic visit or your schedule please contact [facility] directly at [phone].  Normal or non-critical lab and imaging results will be communicated to you by MyChart, letter or phone within 4 business days after the clinic has received the results. If you do not hear from us within 7 days, please contact the clinic through MyChart or phone. If you have a critical or abnormal lab result, we will notify you by phone as soon as possible.  Submit refill requests through [employer] or call your pharmacy and they will forward the refill request to us. Please allow 3 business days for your refill to be completed.          Additional Information About Your Visit        MyChart Information     [employer] lets you send messages to your doctor, view your test results, renew your prescriptions, schedule appointments and more. To sign up, go to [email or web address] . Click on \"Log in\" on the left side of the screen, which will take you to the Welcome page. Then click on \"Sign up Now\" on the right side of the page.     You will be asked to enter the access code listed below, as well as some personal information. Please follow the directions to create your username and password.     Your access code is: [email or web address]  Expires: 7/31/2018 10:07 AM     Your access code will "  in 90 days. If you need help or a new code, please call your [facility] clinic or [phone].        Care EveryWhere ID     This is your Care EveryWhere ID. This could be used by other organizations to access your [facility] medical records  [id number]        Your Vitals Were     Pulse Temperature Respirations Pulse Oximetry BMI (Body Mass Index)       72 97.2  F (36.2  C) (Oral) 18 96% 26.79 kg/m2        Blood Pressure from Last 3 Encounters:   18 114/70   18 135/87   18 141/79    Weight from Last 3 Encounters:   18 78.2 kg (172 lb 4.8 oz)   18 78.6 kg (173 lb 4.8 oz)   18 78.4 kg (172 lb 12.8 oz)              Today, you had the following     No orders found for display         Today's Medication Changes          These changes are accurate as of 18 11:40 AM.  If you have any questions, ask your nurse or doctor.               These medicines have changed or have updated prescriptions.        Dose/Directions    guanFACINE 1 MG tablet   Commonly known as:  TENEX   This may have changed:  how much to take   Used for:  Generalised anxiety disorder        Dose:  2 mg   Take 2 tablets (2 mg) by mouth At Bedtime   Quantity:  10 tablet   Refills:  0                Primary Care Provider Office Phone # Fax #    Arpan Flores [phone]       [address] [facility] [address]  [facility] [address]        Equal Access to Services     RANDOLPH VASQUEZ AH: Hadii lilian murryo Sobimal, waaxda luqadaha, qaybta kaalmada adeegyada, waxay richard mcmillan . So [facility] [phone].    ATENCIÓN: Si habla español, tiene a english disposición servicios gratuitos de asistencia lingüística. Llame al [phone].    We comply with applicable federal civil rights laws and [place] laws. We do not discriminate on the basis of race, color, national origin, age, disability, sex, sexual orientation, or gender identity.            Thank you!     Thank you for choosing  [facility]  for your care. Our goal is always to provide you with excellent care. Hearing back from our patients is one way we can continue to improve our services. Please take a few minutes to complete the written survey that you may receive in the mail after your visit with us. Thank you!             Your Updated Medication List - Protect others around you: Learn how to safely use, store and throw away your medicines at www.disposemymeds.org.          This list is accurate as of 6/20/18 11:40 AM.  Always use your most recent med list.                   Brand Name Dispense Instructions for use Diagnosis    amphetamine-dextroamphetamine 5 MG per tablet    ADDERALL    5 tablet    Take 1 tablet (5 mg) by mouth daily    ADHD (attention deficit hyperactivity disorder), Generalised anxiety disorder       citalopram 10 MG tablet    celeXA    5 tablet    Take 1 tablet (10 mg) by mouth daily    Generalised anxiety disorder       guanFACINE 1 MG tablet    TENEX    10 tablet    Take 2 tablets (2 mg) by mouth At Bedtime    Generalised anxiety disorder       ketoconazole 2 % cream    NIZORAL    15 g    Apply topically 2 times daily    Athlete's foot on right

## 2018-06-20 NOTE — TELEPHONE ENCOUNTER
Dr. Flores    See pt message    I did talk with his mom and let her know you would look at the CT results and advise plan    CT cued    Cher Saldivar RN   [facility]

## 2018-06-20 NOTE — PATIENT INSTRUCTIONS
Rash care: Use derm cream and hydrocortisone cream on wet skin. Twice daily.   If symptoms do not subside in 2 weeks, return to clinic.

## 2018-09-12 ENCOUNTER — OFFICE VISIT (OUTPATIENT)
Dept: FAMILY MEDICINE | Facility: CLINIC | Age: 38
End: 2018-09-12
Payer: COMMERCIAL

## 2018-09-12 VITALS
BODY MASS INDEX: 26.06 KG/M2 | HEIGHT: 67 IN | WEIGHT: 166 LBS | OXYGEN SATURATION: 100 % | TEMPERATURE: 98 F | RESPIRATION RATE: 16 BRPM | HEART RATE: 52 BPM | SYSTOLIC BLOOD PRESSURE: 110 MMHG | DIASTOLIC BLOOD PRESSURE: 72 MMHG

## 2018-09-12 DIAGNOSIS — I42.2 HYPERTROPHIC CARDIOMYOPATHY (H): ICD-10-CM

## 2018-09-12 DIAGNOSIS — Z87.891 EX-SMOKER: ICD-10-CM

## 2018-09-12 DIAGNOSIS — Z23 NEED FOR PROPHYLACTIC VACCINATION AND INOCULATION AGAINST INFLUENZA: ICD-10-CM

## 2018-09-12 DIAGNOSIS — L29.0 PRURITUS ANI: ICD-10-CM

## 2018-09-12 DIAGNOSIS — Z00.00 MEDICARE ANNUAL WELLNESS VISIT, SUBSEQUENT: Primary | ICD-10-CM

## 2018-09-12 LAB
ANION GAP SERPL CALCULATED.3IONS-SCNC: 7 MMOL/L (ref 3–14)
BUN SERPL-MCNC: 14 MG/DL (ref 7–30)
CALCIUM SERPL-MCNC: 9.1 MG/DL (ref 8.5–10.1)
CHLORIDE SERPL-SCNC: 106 MMOL/L (ref 94–109)
CHOLEST SERPL-MCNC: 167 MG/DL
CO2 SERPL-SCNC: 28 MMOL/L (ref 20–32)
CREAT SERPL-MCNC: 0.63 MG/DL (ref 0.66–1.25)
GFR SERPL CREATININE-BSD FRML MDRD: >90 ML/MIN/1.7M2
GLUCOSE SERPL-MCNC: 96 MG/DL (ref 70–99)
HDLC SERPL-MCNC: 37 MG/DL
LDLC SERPL CALC-MCNC: 96 MG/DL
NONHDLC SERPL-MCNC: 130 MG/DL
POTASSIUM SERPL-SCNC: 4.2 MMOL/L (ref 3.4–5.3)
SODIUM SERPL-SCNC: 141 MMOL/L (ref 133–144)
TRIGL SERPL-MCNC: 169 MG/DL

## 2018-09-12 PROCEDURE — G0008 ADMIN INFLUENZA VIRUS VAC: HCPCS | Performed by: FAMILY MEDICINE

## 2018-09-12 PROCEDURE — 80048 BASIC METABOLIC PNL TOTAL CA: CPT | Performed by: FAMILY MEDICINE

## 2018-09-12 PROCEDURE — 80061 LIPID PANEL: CPT | Performed by: FAMILY MEDICINE

## 2018-09-12 PROCEDURE — 90686 IIV4 VACC NO PRSV 0.5 ML IM: CPT | Performed by: FAMILY MEDICINE

## 2018-09-12 PROCEDURE — 99395 PREV VISIT EST AGE 18-39: CPT | Mod: 25 | Performed by: FAMILY MEDICINE

## 2018-09-12 PROCEDURE — 36415 COLL VENOUS BLD VENIPUNCTURE: CPT | Performed by: FAMILY MEDICINE

## 2018-09-12 NOTE — MR AVS SNAPSHOT
After Visit Summary   9/12/2018    Mehran Ibarra    MRN: [id number]           Patient Information     Date Of Birth          1980        Visit Information        Provider Department      9/12/2018 8:00 AM Arpan Flores MD [facility]        Today's Diagnoses     Encounter for routine adult health examination with abnormal findings    -  1    Pruritus ani        Hypertrophic cardiomyopathy (H)        Ex-smoker        Need for prophylactic vaccination and inoculation against influenza          Care Instructions      Preventive Health Recommendations  Male Ages 26 - 39    Yearly exam:             See your health care provider every year in order to  o   Review health changes.   o   Discuss preventive care.    o   Review your medicines if your doctor has prescribed any.    You should be tested each year for STDs (sexually transmitted diseases), if you re at risk.     After age 35, talk to your provider about cholesterol testing. If you are at risk for heart disease, have your cholesterol tested at least every 5 years.     If you are at risk for diabetes, you should have a diabetes test (fasting glucose).  Shots: Get a flu shot each year. Get a tetanus shot every 10 years.     Nutrition:    Eat at least 5 servings of fruits and vegetables daily.     Eat whole-grain bread, whole-wheat pasta and brown rice instead of white grains and rice.     Get adequate Calcium and Vitamin D.     Lifestyle    Exercise for at least 150 minutes a week (30 minutes a day, 5 days a week). This will help you control your weight and prevent disease.     Limit alcohol to one drink per day.     No smoking.     Wear sunscreen to prevent skin cancer.     See your dentist every six months for an exam and cleaning.     Clean water baths once a week. Can use over the counter Benadryl cream on affected area.           Follow-ups after your visit        Follow-up notes from your care team     Return in about  "1 year (around 2019).      Who to contact     If you have questions or need follow up information about today's clinic visit or your schedule please contact [facility] directly at [phone].  Normal or non-critical lab and imaging results will be communicated to you by MyChart, letter or phone within 4 business days after the clinic has received the results. If you do not hear from us within 7 days, please contact the clinic through [employer] or phone. If you have a critical or abnormal lab result, we will notify you by phone as soon as possible.  Submit refill requests through [employer] or call your pharmacy and they will forward the refill request to us. Please allow 3 business days for your refill to be completed.          Additional Information About Your Visit        [employer] Information     [employer] lets you send messages to your doctor, view your test results, renew your prescriptions, schedule appointments and more. To sign up, go to [email or web address] . Click on \"Log in\" on the left side of the screen, which will take you to the Welcome page. Then click on \"Sign up Now\" on the right side of the page.     You will be asked to enter the access code listed below, as well as some personal information. Please follow the directions to create your username and password.     Your access code is: [email or web address]  Expires: 2018 10:46 AM     Your access code will  in 90 days. If you need help or a new code, please call your [facility] or [phone].        Care EveryWhere ID     This is your Care EveryWhere ID. This could be used by other organizations to access your [facility] medical records  [id number]        Your Vitals Were     Pulse Temperature Respirations Height Pulse Oximetry BMI (Body Mass Index)    52 98  F (36.7  C) (Temporal) 16 5' 7\" (1.702 m) 100% 26 kg/m2       Blood Pressure from Last 3 Encounters:   18 110/72   18 114/70   18 135/87    Weight " from Last 3 Encounters:   09/12/18 166 lb (75.3 kg)   06/20/18 172 lb 4.8 oz (78.2 kg)   06/17/18 173 lb 4.8 oz (78.6 kg)              We Performed the Following     Basic metabolic panel     FLU VACCINE, SPLIT VIRUS, IM (QUADRIVALENT) [27854]- >3 YRS     Lipid panel reflex to direct LDL Fasting     Vaccine Administration, Initial [36157]          Today's Medication Changes          These changes are accurate as of 9/12/18  8:39 AM.  If you have any questions, ask your nurse or doctor.               These medicines have changed or have updated prescriptions.        Dose/Directions    guanFACINE 1 MG tablet   Commonly known as:  TENEX   This may have changed:  how much to take   Used for:  Generalised anxiety disorder        Dose:  2 mg   Take 2 tablets (2 mg) by mouth At Bedtime   Quantity:  10 tablet   Refills:  0                Primary Care Provider Office Phone # Fax #    Arpan Flores [phone]       [address] [address]        Equal Access to Services     [facility] : Hadii lilian ku hadasho Soomaali, waaxda luqadaha, qaybta kaalmada adeegyada, nichole mcmillan . So [facility] [phone].    ATENCIÓN: Si habla español, tiene a english disposición servicios gratuitos de asistencia lingüística. LlSt. [facility] [phone].    We comply with applicable federal civil rights laws and [place] laws. We do not discriminate on the basis of race, color, national origin, age, disability, sex, sexual orientation, or gender identity.            Thank you!     Thank you for choosing [facility]  for your care. Our goal is always to provide you with excellent care. Hearing back from our patients is one way we can continue to improve our services. Please take a few minutes to complete the written survey that you may receive in the mail after your visit with us. Thank you!             Your Updated Medication List - Protect others around you: Learn  how to safely use, store and throw away your medicines at www.disposemymeds.org.          This list is accurate as of 9/12/18  8:39 AM.  Always use your most recent med list.                   Brand Name Dispense Instructions for use Diagnosis    amphetamine-dextroamphetamine 5 MG per tablet    ADDERALL    5 tablet    Take 1 tablet (5 mg) by mouth daily    ADHD (attention deficit hyperactivity disorder), Generalised anxiety disorder       citalopram 10 MG tablet    celeXA    5 tablet    Take 1 tablet (10 mg) by mouth daily    Generalised anxiety disorder       guanFACINE 1 MG tablet    TENEX    10 tablet    Take 2 tablets (2 mg) by mouth At Bedtime    Generalised anxiety disorder       ketoconazole 2 % cream    NIZORAL    30 g    Apply topically 2 times daily as needed for itching

## 2018-09-12 NOTE — PROGRESS NOTES
SUBJECTIVE:   CC: Mehran Ibarra is an 38 year old male who presents for preventative health visit with Mother.     Healthy Habits:    Do you get at least three servings of calcium containing foods daily (dairy, green leafy vegetables, etc.)? yes    Amount of exercise or daily activities, outside of work: [facility] and walks several days a week    Problems taking medications regularly No    Medication side effects: No    Have you had an eye exam in the past two years? yes    Do you see a dentist twice per year? yes    Do you have sleep apnea, excessive snoring or daytime drowsiness?no     Vision  He gets a regular vision exam.     Dental  Patient gets a regular dental exam.     Cardiovascular   He wore a monitor for 1 month, and it did not show findings.     GI  He has experienced stomach aches when he does not eat in the morning. He was in the ER for a sharp abdominal pain. He is having daily bowel movements. Patient feels he is eating enough fiber.     Skin  He went to see Dermatology and had 2 spots biopsied.     Labs  He is fasting Today.     Immunizations  He would like to get the Flu Shot Today.     Medications   Patient has been taking his medications.     Social History  He has not smoked in 5-6 months.       Today's PHQ-2 Score:   PHQ-2 ( 1999 Pfizer) 6/20/2018 4/26/2017   Q1: Little interest or pleasure in doing things 0 0   Q2: Feeling down, depressed or hopeless 0 0   PHQ-2 Score 0 0       Abuse: Current or Past(Physical, Sexual or Emotional)- No  Do you feel safe in your environment - Yes    Social History   Substance Use Topics     Smoking status: Former Smoker     Packs/day: 0.02     Years: 10.00     Types: Cigarettes, Cigars     Quit date: 2/28/2013     Smokeless tobacco: Never Used     Alcohol use No      If you drink alcohol do you typically have >3 drinks per day or >7 drinks per week? No                      Last PSA: No results found for: PSA    Reviewed orders with  patient. Reviewed health maintenance and updated orders accordingly - Yes  Labs reviewed in EPIC  BP Readings from Last 3 Encounters:   09/12/18 110/72   06/20/18 114/70   06/18/18 135/87    Wt Readings from Last 3 Encounters:   09/12/18 166 lb (75.3 kg)   06/20/18 172 lb 4.8 oz (78.2 kg)   06/17/18 173 lb 4.8 oz (78.6 kg)                  Patient Active Problem List   Diagnosis     GERD (gastroesophageal reflux disease)     IBS (irritable bowel syndrome)     Pectus excavatum     Attention deficit disorder     Generalized anxiety disorder     CARDIOVASCULAR SCREENING; LDL GOAL LESS THAN 160     Global developmental delay     Seborrheic dermatitis     Atypical nevus of back     Elevated blood pressure reading without diagnosis of hypertension     Bunion of great toe of left foot     Epithelial cyst     Scolioses     Impulse control disorder     Intellectual disability     Tobacco dependence in remission     Lung nodule, solitary     Hypertrophic cardiomyopathy (H)     Past Surgical History:   Procedure Laterality Date     HERNIA REPAIR, INGUINAL RT/LT      Hernia Repair, RT/LT     ingrown toenail       THORACOSCOPIC REPAIR PECTUS EXCAVATUM CHILD ([facility])       tooth removal         Social History   Substance Use Topics     Smoking status: Former Smoker     Packs/day: 0.02     Years: 10.00     Types: Cigarettes, Cigars     Quit date: 2/28/2013     Smokeless tobacco: Never Used     Alcohol use No     Family History   Problem Relation Age of Onset     Hypertension Father      Cardiovascular Mother      HEART DISEASE Maternal Grandmother      Diabetes Paternal Grandfather          Current Outpatient Prescriptions   Medication Sig Dispense Refill     amphetamine-dextroamphetamine (ADDERALL) 5 MG tablet Take 1 tablet (5 mg) by mouth daily 5 tablet 0     citalopram (CELEXA) 10 MG tablet Take 1 tablet (10 mg) by mouth daily 5 tablet 0     ketoconazole (NIZORAL) 2 % cream Apply topically 2 times daily as needed for itching 30 g  "1     guanFACINE (TENEX) 1 MG tablet Take 2 tablets (2 mg) by mouth At Bedtime (Patient taking differently: Take 1 mg by mouth At Bedtime ) 10 tablet 0     No Known Allergies    Reviewed and updated as needed this visit by clinical staff  Tobacco  Allergies  Meds  Med Hx  Surg Hx  Fam Hx  Soc Hx        Reviewed and updated as needed this visit by Provider        Past Medical History:   Diagnosis Date     CARDIOVASCULAR SCREENING; LDL GOAL LESS THAN 160 6/9/2012     Elevated blood pressure reading without diagnosis of hypertension 8/21/2015     Generalized anxiety disorder 6/8/2012     Diagnosis updated by automated process. Provider to review and confirm.     GERD (gastroesophageal reflux disease) 3/8/2011     Global developmental delay      IBS (irritable bowel syndrome) 3/8/2011    With constipation      Left ventricular hypertrophy 10/1/2017    Echo 9/17, referral Dr Lopez annual followup, and prn. MR in future.     Pectus excavatum 6/8/2012     Scolioses      Undiagnosed cardiac murmurs 9/11/2017      Past Surgical History:   Procedure Laterality Date     HERNIA REPAIR, INGUINAL RT/LT      Hernia Repair, RT/LT     ingrown toenail       THORACOSCOPIC REPAIR PECTUS EXCAVATUM CHILD ([facility])       tooth removal         ROS:  Remainder of ROS is negative unless otherwise noted above or in HPI.    This document serves as a record of the services and decisions personally performed and made by Arpan Flores MD. It was created on his behalf by Kady Delgadillo, a trained medical scribe. The creation of this document is based on the provider's statements to the medical scribe.  Kady Delgadillo 8:16 AM September 12, 2018    OBJECTIVE:   /72 (BP Location: Right arm, Patient Position: Sitting, Cuff Size: Adult Regular)  Pulse 52  Temp 98  F (36.7  C) (Temporal)  Resp 16  Ht 5' 7\" (1.702 m)  Wt 166 lb (75.3 kg)  SpO2 100%  BMI 26 kg/m2  EXAM:  GENERAL: healthy, alert and no distress  EYES: Eyes grossly normal to " inspection, PERRL and conjunctivae and sclerae normal  HENT: ear canals and TM's normal, nose and mouth without ulcers or lesions  NECK: no adenopathy, no asymmetry, masses, or scars and thyroid normal to palpation  RESP: lungs clear to auscultation - no rales, rhonchi or wheezes  CV: regular rate and rhythm, normal S1 S2, no S3 or S4, systolic murmur at left border, click or rub, no peripheral edema and peripheral pulses strong  ABDOMEN: soft, nontender, no hepatosplenomegaly, no masses and bowel sounds normal   (male): normal male genitalia without lesions or urethral discharge, no hernia  RECTAL: normal sphincter tone, no rectal masses, prostate normal size, smooth, nontender without nodules or masses  MS: no gross musculoskeletal defects noted, no edema  SKIN: 1cm biopsied spots at mid back between shoulder blades with scattered pigmented abeba, none are worrisome, some sun damaged skin, but not worrisome, pectus well healed scar noted on chest, otherwise no suspicious lesions or rashes  NEURO: Normal strength and tone, mentation intact and speech normal  PSYCH: mentation appears normal, affect normal/bright    Diagnostic Test Results:  No results found for this or any previous visit (from the past 24 hour(s)).    ASSESSMENT/PLAN:   (Z00.01) Encounter for routine adult health examination with abnormal findings  (primary encounter diagnosis)  Comment: Patient is doing well.   Plan: Basic metabolic panel, Lipid panel reflex to         direct LDL Fasting    (L29.0) Pruritus ani  Comment: Reported by Patient.   Plan: Directed to take clean water baths once a week and to use Benadryl cream on affected area.     (I42.2) Hypertrophic cardiomyopathy (H)  Comment: Stable.     (Z87.891) Ex-smoker  Comment: Reported by Patient.     (Z23) Need for prophylactic vaccination and inoculation against influenza  Comment: Patient agrees to update vaccine.   Plan: FLU VACCINE, SPLIT VIRUS, IM (QUADRIVALENT)         [30082]- >3  "YRS, Vaccine Administration,         Initial [52231]    Patient Instructions     Preventive Health Recommendations  Male Ages 26 - 39    Yearly exam:             See your health care provider every year in order to  o   Review health changes.   o   Discuss preventive care.    o   Review your medicines if your doctor has prescribed any.    You should be tested each year for STDs (sexually transmitted diseases), if you re at risk.     After age 35, talk to your provider about cholesterol testing. If you are at risk for heart disease, have your cholesterol tested at least every 5 years.     If you are at risk for diabetes, you should have a diabetes test (fasting glucose).  Shots: Get a flu shot each year. Get a tetanus shot every 10 years.     Nutrition:    Eat at least 5 servings of fruits and vegetables daily.     Eat whole-grain bread, whole-wheat pasta and brown rice instead of white grains and rice.     Get adequate Calcium and Vitamin D.     Lifestyle    Exercise for at least 150 minutes a week (30 minutes a day, 5 days a week). This will help you control your weight and prevent disease.     Limit alcohol to one drink per day.     No smoking.     Wear sunscreen to prevent skin cancer.     See your dentist every six months for an exam and cleaning.     Clean water baths once a week. Can use over the counter Benadryl cream on affected area.         COUNSELING:  Reviewed preventive health counseling, as reflected in patient instructions       Dental care, Flu Shot       Vision screening Tobacco Cessation    BP Readings from Last 1 Encounters:   09/12/18 110/72     Estimated body mass index is 26 kg/(m^2) as calculated from the following:    Height as of this encounter: 5' 7\" (1.702 m).    Weight as of this encounter: 166 lb (75.3 kg).      Weight management plan: Discussed healthy diet and exercise guidelines and patient will follow up in 12 months in clinic to re-evaluate.     reports that he quit smoking about 5 " years ago. His smoking use included Cigarettes and Cigars. He has a 0.20 pack-year smoking history. He has never used smokeless tobacco.      The information in this document, created by the medical scribe for me, accurately reflects the services I personally performed and the decisions made by me. I have reviewed and approved this document for accuracy prior to leaving the patient care area.  September 12, 2018 8:45 AM    Arpan Flores MD  [facility]    Injectable Influenza Immunization Documentation    1.  Is the person to be vaccinated sick today?   No    2. Does the person to be vaccinated have an allergy to a component   of the vaccine?   No  Egg Allergy Algorithm Link    3. Has the person to be vaccinated ever had a serious reaction   to influenza vaccine in the past?   No    4. Has the person to be vaccinated ever had Guillain-Barré syndrome?   No    Form completed by Kesha Pal

## 2018-09-12 NOTE — PATIENT INSTRUCTIONS
Preventive Health Recommendations  Male Ages 26 - 39    Yearly exam:             See your health care provider every year in order to  o   Review health changes.   o   Discuss preventive care.    o   Review your medicines if your doctor has prescribed any.    You should be tested each year for STDs (sexually transmitted diseases), if you re at risk.     After age 35, talk to your provider about cholesterol testing. If you are at risk for heart disease, have your cholesterol tested at least every 5 years.     If you are at risk for diabetes, you should have a diabetes test (fasting glucose).  Shots: Get a flu shot each year. Get a tetanus shot every 10 years.     Nutrition:    Eat at least 5 servings of fruits and vegetables daily.     Eat whole-grain bread, whole-wheat pasta and brown rice instead of white grains and rice.     Get adequate Calcium and Vitamin D.     Lifestyle    Exercise for at least 150 minutes a week (30 minutes a day, 5 days a week). This will help you control your weight and prevent disease.     Limit alcohol to one drink per day.     No smoking.     Wear sunscreen to prevent skin cancer.     See your dentist every six months for an exam and cleaning.     Clean water baths once a week. Can use over the counter Benadryl cream on affected area.

## 2018-09-12 NOTE — PROGRESS NOTES
"  Injectable Influenza Immunization Documentation    1.  Is the person to be vaccinated sick today?  {YES/NO DEFAULT NO:68121::\" No\"}    2. Does the person to be vaccinated have an allergy to a component   of the vaccine?  {YES/NO DEFAULT NO:88790::\" No\"}  Egg Allergy Algorithm Link    3. Has the person to be vaccinated ever had a serious reaction   to influenza vaccine in the past?  {YES/NO DEFAULT NO:71804::\" No\"}    4. Has the person to be vaccinated ever had Guillain-Barré syndrome?  {YES/NO DEFAULT NO:92376::\" No\"}    Form completed by ***         "

## 2019-01-17 ENCOUNTER — TELEPHONE (OUTPATIENT)
Dept: FAMILY MEDICINE | Facility: CLINIC | Age: 39
End: 2019-01-17

## 2019-01-17 NOTE — TELEPHONE ENCOUNTER
Reason for Call:  Form, our goal is to have forms completed with 72 hours, however, some forms may require a visit or additional information.    Type of letter, form or note:  [employer]     Who is the form from?: Patient    Where did the form come from: Patient or family brought in       [place] [facility] [place]    Where the form was placed: Given to MA/RN    What number is listed as a contact on the form?: [phone]       Additional comments: Please fill out and fax to [phone] Dang Connell    Call taken on 1/17/2019 at 4:08 PM by Sisi Perez

## 2019-01-29 NOTE — TELEPHONE ENCOUNTER
Patient called to inquire about the status of the form because it was not received by Please fill out and fax to [phone] Attn Sandhya Connell.     Please call Mehran at [phone] when the form is completed and faxed.

## 2019-01-30 NOTE — TELEPHONE ENCOUNTER
Forms completed and faxed to [phone].  Called patient and talked to Kay(Mother) and informed her that this was done.

## 2019-02-20 ENCOUNTER — TELEPHONE (OUTPATIENT)
Dept: FAMILY MEDICINE | Facility: CLINIC | Age: 39
End: 2019-02-20

## 2019-02-20 NOTE — TELEPHONE ENCOUNTER
"Spoke with mom. She states that pt's sores are a little bit open. Kind of crusty. Not itchy. Pt states that they hurt, describing it as \"deep\" pain. Pt is holding the area. She can tell that it is bothering him. Mom states pt said that it has been there for about a week.     Advised that they re-schedule OV. Cancelled appt today with Emily and re-scheduled for tomorrow at 1:40 pm with Teresa.    Margarita Kennedy RN  [facility]  "

## 2019-02-20 NOTE — TELEPHONE ENCOUNTER
Reason for call:  Other   Patient called regarding (reason for call): call back  Additional comments: The patients mother called and stated that she would like to discuss open sores near her sons ribs. He has an appointment with Emily Balderas at 9:30 but she would prefer not to drive in the snow if she does not have to. She would like a call back to discuss what the best course of action would be to get her son treated. She stated if it is recommended that she bring him in to be seen, she will absolutely do so.     Phone number to reach patient: [phone]      Best Time: Any    Can we leave a detailed message on this number?  YES

## 2019-02-21 ENCOUNTER — OFFICE VISIT (OUTPATIENT)
Dept: FAMILY MEDICINE | Facility: CLINIC | Age: 39
End: 2019-02-21
Payer: COMMERCIAL

## 2019-02-21 VITALS
WEIGHT: 166.6 LBS | DIASTOLIC BLOOD PRESSURE: 82 MMHG | OXYGEN SATURATION: 99 % | TEMPERATURE: 97 F | HEART RATE: 78 BPM | BODY MASS INDEX: 26.09 KG/M2 | SYSTOLIC BLOOD PRESSURE: 138 MMHG

## 2019-02-21 DIAGNOSIS — L30.9 DERMATITIS: Primary | ICD-10-CM

## 2019-02-21 PROCEDURE — 99213 OFFICE O/P EST LOW 20 MIN: CPT | Performed by: PHYSICIAN ASSISTANT

## 2019-02-21 NOTE — PROGRESS NOTES
SUBJECTIVE:   Mehran Ibarra is a 39 year old male who presents to clinic today with his mother for the following health issues:    Concern - Shingles   Onset: one week     Description:   Looks like bug bites and complained of it hurting     Intensity: moderate    Progression of Symptoms:  improving    Accompanying Signs & Symptoms:  Painful in the ribs     Previous history of similar problem:   no    Precipitating factors:   Worsened by: No    Alleviating factors:  Improved by: Nothing     Therapies Tried and outcome: put cream on and band aids but it did not help.    -Patient symptoms began about a week ago across his chest and abdomen, starting with a burning feeling  -His mother states that it originally looked like bug bites, she thought it was odd that he had been complaining about it so much  -She believes that the area looks improved  -He rates his pain as about 3/10, pain has improved recently  -He had some trouble sleeping at the onset of the pain but he slept well last night        Problem list and histories reviewed & adjusted, as indicated.  Additional history: as documented    ROS:  CONSTITUTIONAL: NEGATIVE for fever, chills, change in weight  INTEGUMENTARY/SKIN: NEGATIVE for worrisome rashes, moles or lesions, see HPI above  EYES: NEGATIVE for vision changes or irritation  ENT/MOUTH: NEGATIVE for ear, mouth and throat problems  RESP: NEGATIVE for significant cough or SOB  BREAST: NEGATIVE for masses, tenderness or discharge  CV: NEGATIVE for chest pain, palpitations or peripheral edema  GI: NEGATIVE for nausea, abdominal pain, heartburn, or change in bowel habits  : NEGATIVE for frequency, dysuria, or hematuria  MUSCULOSKELETAL: NEGATIVE for significant arthralgias or myalgia  NEURO: NEGATIVE for weakness, dizziness or paresthesias  ENDOCRINE: NEGATIVE for temperature intolerance, skin/hair changes  HEME: NEGATIVE for bleeding problems  PSYCHIATRIC: NEGATIVE for changes in mood or affect    This  document serves as a record of the services and decisions personally performed and made by Martha Garibay PA-C. It was created on her behalf by Fahad Perez, trained medical scribe. The creation of this document is based on the provider's statements to the medical scribe.  Fahad Perez 1:59 PM 2019    Patient Active Problem List   Diagnosis     GERD (gastroesophageal reflux disease)     IBS (irritable bowel syndrome)     Pectus excavatum     Attention deficit disorder     Generalized anxiety disorder     CARDIOVASCULAR SCREENING; LDL GOAL LESS THAN 160     Global developmental delay     Seborrheic dermatitis     Atypical nevus of back     Elevated blood pressure reading without diagnosis of hypertension     Bunion of great toe of left foot     Epithelial cyst     Scolioses     Impulse control disorder     Intellectual disability     Tobacco dependence in remission     Lung nodule, solitary     Hypertrophic cardiomyopathy (H)     Past Surgical History:   Procedure Laterality Date     HERNIA REPAIR, INGUINAL RT/LT      Hernia Repair, RT/LT     ingrown toenail       THORACOSCOPIC REPAIR PECTUS EXCAVATUM CHILD ([facility])       tooth removal         Social History     Tobacco Use     Smoking status: Former Smoker     Packs/day: 0.02     Years: 10.00     Pack years: 0.20     Types: Cigarettes, Cigars     Last attempt to quit: 2013     Years since quittin.9     Smokeless tobacco: Never Used   Substance Use Topics     Alcohol use: No     Family History   Problem Relation Age of Onset     Hypertension Father      Cardiovascular Mother      Heart Disease Maternal Grandmother      Diabetes Paternal Grandfather            Labs reviewed in EPIC  Patient Active Problem List   Diagnosis     GERD (gastroesophageal reflux disease)     IBS (irritable bowel syndrome)     Pectus excavatum     Attention deficit disorder     Generalized anxiety disorder     CARDIOVASCULAR SCREENING; LDL GOAL LESS THAN 160      Global developmental delay     Seborrheic dermatitis     Atypical nevus of back     Elevated blood pressure reading without diagnosis of hypertension     Bunion of great toe of left foot     Epithelial cyst     Scolioses     Impulse control disorder     Intellectual disability     Tobacco dependence in remission     Lung nodule, solitary     Hypertrophic cardiomyopathy (H)     Past Surgical History:   Procedure Laterality Date     HERNIA REPAIR, INGUINAL RT/LT      Hernia Repair, RT/LT     ingrown toenail       THORACOSCOPIC REPAIR PECTUS EXCAVATUM CHILD ([facility])       tooth removal         Social History     Tobacco Use     Smoking status: Former Smoker     Packs/day: 0.02     Years: 10.00     Pack years: 0.20     Types: Cigarettes, Cigars     Last attempt to quit: 2013     Years since quittin.9     Smokeless tobacco: Never Used   Substance Use Topics     Alcohol use: No     Family History   Problem Relation Age of Onset     Hypertension Father      Cardiovascular Mother      Heart Disease Maternal Grandmother      Diabetes Paternal Grandfather          Current Outpatient Medications   Medication Sig Dispense Refill     amphetamine-dextroamphetamine (ADDERALL) 5 MG tablet Take 1 tablet (5 mg) by mouth daily 5 tablet 0     citalopram (CELEXA) 10 MG tablet Take 1 tablet (10 mg) by mouth daily 5 tablet 0     guanFACINE (TENEX) 1 MG tablet Take 2 tablets (2 mg) by mouth At Bedtime (Patient taking differently: Take 1 mg by mouth At Bedtime ) 10 tablet 0     ketoconazole (NIZORAL) 2 % cream Apply topically 2 times daily as needed for itching 30 g 1       OBJECTIVE:                                                    /82   Pulse 78   Temp 97  F (36.1  C) (Oral)   Wt 75.6 kg (166 lb 9.6 oz)   SpO2 99%   BMI 26.09 kg/m   Body mass index is 26.09 kg/m .   GENERAL:: healthy, alert and no distress  RESP: lungs clear to auscultation - no rales, no rhonchi, no wheezes  CV: regular rates and rhythm, normal S1 S2,  "no S3 or S4 and no murmur, no click or rub -  SKIN: no suspicious lesions, no rashes, left anterior rib with 2 papules approximately 3 mm that are pink and blanching, hypersensitivity to the anterior part of T7 T8 but not posterior  NEURO: strength and tone- normal, sensory exam- grossly normal, mentation- intact, speech- normal  PSYCH: Alert and oriented times 3; speech- coherent , normal rate and volume; able to articulate logical thoughts, able to abstract reason, no tangential thoughts, no hallucinations or delusions, affect- normal    No results found for this or any previous visit (from the past 24 hour(s)).       ASSESSMENT/PLAN:                                                        ICD-10-CM    1. Dermatitis L30.9        This isn't entirely consistent with shingles since he does not have hypersensitivity of the skin around the entire dermatome. He also has a difficult time expressing his symptoms. It is already 1+ week into his symptoms so antivirals won't help anyway. He also says the pain is getting better. We'll continue to monitor and advised to use ibuprofen as needed. Return to clinic for any new or worsening symptoms or go to ER Urgent care in off hours        Estimated body mass index is 26.09 kg/m  as calculated from the following:    Height as of 9/12/18: 1.702 m (5' 7\").    Weight as of this encounter: 75.6 kg (166 lb 9.6 oz).       The information in this document, created by the medical scribe for me, accurately reflects the services I personally performed and the decisions made by me. I have reviewed and approved this document for accuracy prior to leaving the patient care area.  February 21, 2019 1:59 PM    Martha Garibay  [facility] [place]    .  "

## 2019-04-10 ENCOUNTER — OFFICE VISIT (OUTPATIENT)
Dept: FAMILY MEDICINE | Facility: CLINIC | Age: 39
End: 2019-04-10
Payer: COMMERCIAL

## 2019-04-10 VITALS — SYSTOLIC BLOOD PRESSURE: 118 MMHG | DIASTOLIC BLOOD PRESSURE: 82 MMHG

## 2019-04-10 DIAGNOSIS — L82.1 SEBORRHEIC KERATOSES: ICD-10-CM

## 2019-04-10 DIAGNOSIS — L91.0 KELOID SCAR: ICD-10-CM

## 2019-04-10 DIAGNOSIS — Z80.8 FAMILY HISTORY OF NONMELANOMA SKIN CANCER: ICD-10-CM

## 2019-04-10 DIAGNOSIS — Z12.83 SKIN CANCER SCREENING: Primary | ICD-10-CM

## 2019-04-10 DIAGNOSIS — D48.5 NEOPLASM OF UNCERTAIN BEHAVIOR OF SKIN: ICD-10-CM

## 2019-04-10 DIAGNOSIS — D22.5 MELANOCYTIC NEVUS OF TRUNK: ICD-10-CM

## 2019-04-10 DIAGNOSIS — Z86.018 HISTORY OF DYSPLASTIC NEVUS: ICD-10-CM

## 2019-04-10 DIAGNOSIS — L91.8 ACROCHORDON: ICD-10-CM

## 2019-04-10 PROCEDURE — 88305 TISSUE EXAM BY PATHOLOGIST: CPT | Mod: TC | Performed by: FAMILY MEDICINE

## 2019-04-10 PROCEDURE — 99213 OFFICE O/P EST LOW 20 MIN: CPT | Mod: 25 | Performed by: FAMILY MEDICINE

## 2019-04-10 PROCEDURE — 11302 SHAVE SKIN LESION 1.1-2.0 CM: CPT | Performed by: FAMILY MEDICINE

## 2019-04-10 NOTE — PROCEDURES
Name : Shave Excision  Indication : Excision of tissue for pathology evaluation.  Location(s) : Left lateral chest wall: 12 x 4 mm in size brown macule with irregular pigmentation. ? Atypical nevus ? Other..  Completed by : Raven Carreno MD  Photo Taken : no.  Anesthesia : Patient was anesthetized by infiltrating the area surrounding the lesion with 1% lidocaine.   epinephrine 1:[id number] : Yes.  Note : Discussed the risk of pain, infection, scarring, hypo- or hyperpigmentation and recurrence or need for re-treatment. The benefits of treatment and alternative treatments were also discussed.    During this procedure, the universal protocol was utilized. The patient's identity was confirmed by no less than two patient identifiers, correct procedure was verified, correct site was verified and marked as applicable and a final pause was completed.    Sterile technique was used throughout the procedure. The skin was cleaned and prepped with surgical cleanser. Once adequate anesthesia was obtained, the lesion was removed with a deep scallop shave procedure. The specimen was sent to pathology.    Direct pressure and aluminum chloride and monopolar cautery was applied for hemostasis. No bleeding was present upon the completion of the procedure. The wound was coated with antibacterial ointment. A dry sterile dressing was applied. Patient tolerated the procedure well and left in satisfactory condition.    Primary provider and referring provider will be informed regarding the tissue report when it returns.

## 2019-04-10 NOTE — PROGRESS NOTES
[facility] - PRIMARY CARE SKIN    CC: skin cancer screening (full-body)  SUBJECTIVE:   Mehran Ibarra is a(n) 39 year old male who presents to clinic today with mother for a full-body skin exam.    No bothersome lesions noticed by the patient or other skin concern. His mother has noticed a growth on the back of the scalp.    Personal Medical History  Skin cancer: NO - but history of mild dysplastic nevus(i) on back    Family Medical History  Skin cancer: YES - keratinocyte carcinoma(s) in maternal grandmother on face    Sun Exposure History  Previous history of significant sun exposure:  Blistering sunburns : YES - once  Tanning beds : NO.  Sunscreen Use : NO.  Sun-protective clothing use : No  Wide-brimmed hats : No  Sunglasses : No  Seeks shade : Yes    Refer to electronic medical record (EMR) for past medical history and medications.    INTEGUMENTARY/SKIN: NEGATIVE for worrisome rashes, moles or lesions  ROS: 14 point review of systems was negative except the symptoms listed above in the HPI.    This document serves as a record of the services and decisions personally performed and made by Sheila Carreno MD and was created by Asim Glaser, a trained medical scribe, based on personal observations and provider statements to the medical scribe.  April 10, 2019 8:58 AM   Asim Glaser    OBJECTIVE:   GENERAL: healthy, alert and no distress.  SKIN: [place] Skin Type - II.  This patient was examined from the top of the head to the bottom of the feet  including scalp, face, neck, trunk, buttocks, both arms, both legs, both hands, both feet, and all fingers and toes. The dermatoscope was used to help evaluate pigmented lesions.  Skin Pertinent Findings:  Left vertex of scalp: 4 mm in size smooth raised flesh-colored lesion. No suspicious characteristics.    Face: pedunculated, benign-appearing skin tag(s)/acrochorda on the eyelids.    Upper extremities: stuck-on appearing papules, raised, brown,  "coarse-textured, round lesion(s) most consistent with seborrheic keratoses.    Significant Findings:  Left lateral chest wall: 12 x 4 mm in size brown macule with irregular pigmentation. ? Atypical nevus ? Other.    Back, 5 cm right of T12: 9 x 5 mm in size brown macule Continue to monitor.    Back, 5 cm left of T5: 5 mm in size pink-grey smooth keloid Continue to monitor.    Left distal thigh: 13 x 10 mm in size keloid scar.    ASSESSMENT:     Encounter Diagnoses   Name Primary?     Skin cancer screening Yes     Neoplasm of uncertain behavior of skin      History of dysplastic nevus      Family history of nonmelanoma skin cancer      Acrochordon      Keloid scar      Seborrheic keratoses      Melanocytic nevus of trunk          PLAN:   Patient Instructions   FUTURE APPOINTMENTS  Follow up in 1 year(s) for a full-body skin cancer screening.  Follow up per pathology report.    WOUND CARE INSTRUCTIONS  1. Wash hands before every dressing change.  2. After 24 hours, change dressing daily.  3. Wash the wound area with a mild soap, then rinse.  4. Gently pat dry with a sterile gauze or Q-tip.  5. Using a Q-tip, apply Vaseline or Aquaphor only over entire wound. Do NOT use Neosporin - as many people react to neomycin.  6. Finally, cover with a bandage or sterile non-stick gauze with micropore paper tape.  7. Repeat once daily until wound has healed.      Soap, water and shampoo will not hurt this area.    Do not go swimming or take baths, but showering is encouraged.    Limit use of the area where the procedure was done for a few days to allow for optimal healing.    If you experience bleeding:  Wash hands and hold firm pressure on the area for 10 minutes without checking to see if the bleeding has stopped. \"Checking\" pulls off the protective wound clot and restarts the bleeding all over again. Re-apply pressure for 10 minutes if necessary to stop bleeding.  Use additional sterile gauze and tape to maintain pressure once " bleeding has stopped.  If bleeding continues, then call back to clinic at [phone].    Signs of Infection:  Infection can occur in any area where skin has been disrupted.  If you notice persistent redness, swelling, colored drainage, increasing pain, fever or other signs of infection, please call us at: [phone] and ask to have me or my colleague paged. We will call you back to discuss.    Pathology Results:  You will be notified, generally via letter or MyChart, in approximately 10 days. If there is anything we need to discuss or further treatment needed, I will call you to discuss it.    PATIENT INFORMATION : WOUNDS  During the healing process you will notice a number of changes. All wounds develop a small halo of redness surrounding the wound.  This means healing is occurring. Severe itching with extensive redness usually indicates sensitivity to the ointment or bandage tape used to dress the wound.  You should call our office if this develops.      Swelling  and/or discoloration around your surgical site is common, particularly when performed around the eye.    All wounds normally drain.  The larger the wound the more drainage there will be.  After 7-10 days, you will notice the wound beginning to shrink and new skin will begin to grow.  The wound is healed when you can see skin has formed over the entire area.  A healed wound has a healthy, shiny look to the surface and is red to dark pink in color to normalize.  Wounds may take approximately 4-6 weeks to heal.  Larger wounds may take 6-8 weeks. After the wound is healed you may discontinue dressing changes.    You may experience a sensation of tightness as your wound heals. This is normal and will gradually subside.    Your healed wound may be sensitive to temperature changes. This sensitivity improves with time, but if you re having a lot of discomfort, try to avoid temperature extremes.    Patients frequently experience itching after their wound  "appears to have healed because of the continue healing under the skin.  Plain Vaseline will help relieve the itching.    SUN PROTECTION INSTRUCTIONS  Sun damage can lead to skin cancer and premature aging of the skin.      The best way to protect from sun damage to your skin is to avoid the sun during peak hours (10 am - 2 pm) even on overcast days.    Never use tanning beds. Tanning beds are associated with much higher risks of skin cancer.    All tanning damages the skin. Aim for ivory skin year round and you will have less trouble with your skin in years to come. There is no merit in getting \"a base tan\" before a warm weather vacation, as any tanning indicates your body's response to sun damage.    Stop smoking. Smokers have higher rates of skin cancer and also have premature skin wrinkling.    Use UPF sun-protective clothing, which while more expensive initially provides longer lasting coverage without having to worry about remembering to re-apply.  1. Wear a wide-brimmed hat and sunglasses.   2. Wear sun-protective clothing.  Ovonyx and other [employer] make sun protective clothing that are stylish, comfortable and cool.   [employer] and other [employer] make UV arm sleeves suitable for golfing, gardening and other activities.    Sunscreen instructions:  1. Use sunscreens with Zinc Oxide, Titanium Dioxide or Avobenzone to protect from UVA rays.  2. Use SPF 30-50+ to protect from UVB rays.  3. Re-apply every 2 hours even if water resistant.  4. Apply on your face every day even when cloudy and even in the winter. UVA \"aging rays\" penetrate window glass and are just as strong in the winter as in the summer.    FYI  You should use about 3 tablespoons of sunscreen to protect your whole body. Thus a typical eight ounce bottle of sunscreen should last 4 applications. Remember, that the SPF rating is compromised if you don t apply enough. Most people only apply 1/2 - 1/3 of the amount they need. Also " don t forget areas such as your ears, feet, upper back and harder to reach places. Keep in mind that these amounts should be increased for larger body sizes.    Sunscreens with titanium dioxide and/or zinc oxide in the active ingredients are physical blockers as opposed to chemical blockers. Chemical-free sunscreens should not irritate the skin.    Spray-on sunscreens may be used for touch-up application only, not as a base layer. Also, use with caution around small children due to inhalation risk.    SPF means sun protection factor, which is just the degree to which the sunscreen can protect against UVB rays. There is no rating system for UVA rays. SPF is calculated as the time skin will burn when sunscreen is applied vs. skin without sunscreen.    Water resistant sunscreens should be re-applied every 1-2 hours.    Product Recommendations:    Consider use of sunscreen sticks with Zinc Oxide and Titanium Dioxide active ingredients such as Neutrogena Pure&Free Baby Sunscreen Stick.    Good examples include: Blue Lizard, EltaMD, Solbar    Good daily moisturizers with SPF: Vanicream, CeraVe.    For sensitive skin, consider : SkinMedica Essential Defense Mineral Shield Broad Spectrum SPF 35    Men: consider use of Neutrogena Triple Protect Facial Lotion    Avoid retinyl palmitate products.  Avoid combination products that include both sunscreen and insect repellant, as sunscreen should be applied every 2 hours, but insect repellant should not be applied as frequently.    For more information:  https://www.skincancer.org/prevention/sun-protection/sunscreen/sunscreens-safe-and-effective    SKIN CANCER SELF-EXAM INSTRUCTIONS  Check every month in the mirror or with a household member. Be aware of any changes, especially bleeding or tenderness. Also, make sure to check your nails for color changes after removal of nail polish.    For melanoma, check for:  A - Asymmetry. One half unlike the other half.  B - Border.  Irregular, scalloped, ragged, notched, blurred or poorly defined borders.  C - Color. Color variations from one area to another, with shades of tan, brown and/or black present. Sometimes white, red or blue.  D - Diameter. Greater than 6 mm (about the size of a pencil eraser). Any new growth of a mole should be concerning and be evaluated.  E - Evolving. A mole or skin lesion that looks different from the rest or is changing in size, shape or color.    For basal cell carcinoma and squamous cell carcinoma, check for:    Sores, shiny bumps, nodules, scaly lesions, or wart-like growths that are itchy, tender, crusting, scabbing, eroding, oozing or bleeding.    Open sores/wounds or reddish/irritated areas that do not heal within 2-3 weeks.    Scar-like areas that are white, yellow or waxy in color.      The patient was counseled about sunscreens and sun avoidance. The patient was counseled to check the skin regularly and report any lesion that is new, changing, itching, scabbing, bleeding or otherwise bothersome. The patient was discharged ambulatory and in stable condition.    TT: 25 minutes.  CT: 15 minutes.    The information in this document, created by the medical scribe for me, accurately reflects the services I personally performed and the decisions made by me. I have reviewed and approved this document for accuracy prior to leaving the patient care area.  April 10, 2019 8:58 AM  Sheila Carreno MD  [facility] [place]

## 2019-04-10 NOTE — LETTER
4/10/2019         RE: Mehran Ibarra  [address]  [place] [address]        Dear Colleague,    Thank you for referring your patient, Mehran Ibarra, to the [facility] [place], [place]. Please see a copy of my visit note below.    [facility] - PRIMARY CARE SKIN    CC: skin cancer screening (full-body)  SUBJECTIVE:   Mehran Ibarra is a(n) 39 year old male who presents to clinic today with mother for a full-body skin exam.    No bothersome lesions noticed by the patient or other skin concern. His mother has noticed a growth on the back of the scalp.    Personal Medical History  Skin cancer: NO - but history of mild dysplastic nevus(i) on back    Family Medical History  Skin cancer: YES - keratinocyte carcinoma(s) in maternal grandmother on face    Sun Exposure History  Previous history of significant sun exposure:  Blistering sunburns : YES - once  Tanning beds : NO.  Sunscreen Use : NO.  Sun-protective clothing use : No  Wide-brimmed hats : No  Sunglasses : No  Seeks shade : Yes    Refer to electronic medical record (EMR) for past medical history and medications.    INTEGUMENTARY/SKIN: NEGATIVE for worrisome rashes, moles or lesions  ROS: 14 point review of systems was negative except the symptoms listed above in the HPI.    This document serves as a record of the services and decisions personally performed and made by Sheila Carreno MD and was created by Asim Glaser, a trained medical scribe, based on personal observations and provider statements to the medical scribe.  April 10, 2019 8:58 AM   Asim Glaser    OBJECTIVE:   GENERAL: healthy, alert and no distress.  SKIN: [place] Skin Type - II.  This patient was examined from the top of the head to the bottom of the feet  including scalp, face, neck, trunk, buttocks, both arms, both legs, both hands, both feet, and all fingers and toes. The dermatoscope was used to help evaluate pigmented lesions.  Skin Pertinent  Findings:  Left vertex of scalp: 4 mm in size smooth raised flesh-colored lesion. No suspicious characteristics.    Face: pedunculated, benign-appearing skin tag(s)/acrochorda on the eyelids.    Upper extremities: stuck-on appearing papules, raised, brown, coarse-textured, round lesion(s) most consistent with seborrheic keratoses.    Significant Findings:  Left lateral chest wall: 12 x 4 mm in size brown macule with irregular pigmentation. ? Atypical nevus ? Other.    Back, 5 cm right of T12: 9 x 5 mm in size brown macule Continue to monitor.    Back, 5 cm left of T5: 5 mm in size pink-grey smooth keloid Continue to monitor.    Left distal thigh: 13 x 10 mm in size keloid scar.    ASSESSMENT:     Encounter Diagnoses   Name Primary?     Skin cancer screening Yes     Neoplasm of uncertain behavior of skin      History of dysplastic nevus      Family history of nonmelanoma skin cancer      Acrochordon      Keloid scar      Seborrheic keratoses      Melanocytic nevus of trunk          PLAN:   Patient Instructions   FUTURE APPOINTMENTS  Follow up in 1 year(s) for a full-body skin cancer screening.  Follow up per pathology report.    WOUND CARE INSTRUCTIONS  1. Wash hands before every dressing change.  2. After 24 hours, change dressing daily.  3. Wash the wound area with a mild soap, then rinse.  4. Gently pat dry with a sterile gauze or Q-tip.  5. Using a Q-tip, apply Vaseline or Aquaphor only over entire wound. Do NOT use Neosporin - as many people react to neomycin.  6. Finally, cover with a bandage or sterile non-stick gauze with micropore paper tape.  7. Repeat once daily until wound has healed.      Soap, water and shampoo will not hurt this area.    Do not go swimming or take baths, but showering is encouraged.    Limit use of the area where the procedure was done for a few days to allow for optimal healing.    If you experience bleeding:  Wash hands and hold firm pressure on the area for 10 minutes without  "checking to see if the bleeding has stopped. \"Checking\" pulls off the protective wound clot and restarts the bleeding all over again. Re-apply pressure for 10 minutes if necessary to stop bleeding.  Use additional sterile gauze and tape to maintain pressure once bleeding has stopped.  If bleeding continues, then call back to clinic at [phone].    Signs of Infection:  Infection can occur in any area where skin has been disrupted.  If you notice persistent redness, swelling, colored drainage, increasing pain, fever or other signs of infection, please call us at: [phone] and ask to have me or my colleague paged. We will call you back to discuss.    Pathology Results:  You will be notified, generally via letter or MyChart, in approximately 10 days. If there is anything we need to discuss or further treatment needed, I will call you to discuss it.    PATIENT INFORMATION : WOUNDS  During the healing process you will notice a number of changes. All wounds develop a small halo of redness surrounding the wound.  This means healing is occurring. Severe itching with extensive redness usually indicates sensitivity to the ointment or bandage tape used to dress the wound.  You should call our office if this develops.      Swelling  and/or discoloration around your surgical site is common, particularly when performed around the eye.    All wounds normally drain.  The larger the wound the more drainage there will be.  After 7-10 days, you will notice the wound beginning to shrink and new skin will begin to grow.  The wound is healed when you can see skin has formed over the entire area.  A healed wound has a healthy, shiny look to the surface and is red to dark pink in color to normalize.  Wounds may take approximately 4-6 weeks to heal.  Larger wounds may take 6-8 weeks. After the wound is healed you may discontinue dressing changes.    You may experience a sensation of tightness as your wound heals. This is normal " "and will gradually subside.    Your healed wound may be sensitive to temperature changes. This sensitivity improves with time, but if you re having a lot of discomfort, try to avoid temperature extremes.    Patients frequently experience itching after their wound appears to have healed because of the continue healing under the skin.  Plain Vaseline will help relieve the itching.    SUN PROTECTION INSTRUCTIONS  Sun damage can lead to skin cancer and premature aging of the skin.      The best way to protect from sun damage to your skin is to avoid the sun during peak hours (10 am - 2 pm) even on overcast days.    Never use tanning beds. Tanning beds are associated with much higher risks of skin cancer.    All tanning damages the skin. Aim for ivory skin year round and you will have less trouble with your skin in years to come. There is no merit in getting \"a base tan\" before a warm weather vacation, as any tanning indicates your body's response to sun damage.    Stop smoking. Smokers have higher rates of skin cancer and also have premature skin wrinkling.    Use UPF sun-protective clothing, which while more expensive initially provides longer lasting coverage without having to worry about remembering to re-apply.  1. Wear a wide-brimmed hat and sunglasses.   2. Wear sun-protective clothing.  [employer] and other [employer] make sun protective clothing that are stylish, comfortable and cool.   [employer] and other [employer] make UV arm sleeves suitable for golfing, gardening and other activities.    Sunscreen instructions:  1. Use sunscreens with Zinc Oxide, Titanium Dioxide or Avobenzone to protect from UVA rays.  2. Use SPF 30-50+ to protect from UVB rays.  3. Re-apply every 2 hours even if water resistant.  4. Apply on your face every day even when cloudy and even in the winter. UVA \"aging rays\" penetrate window glass and are just as strong in the winter as in the summer.    FYI  You should use about 3 " tablespoons of sunscreen to protect your whole body. Thus a typical eight ounce bottle of sunscreen should last 4 applications. Remember, that the SPF rating is compromised if you don t apply enough. Most people only apply 1/2 - 1/3 of the amount they need. Also don t forget areas such as your ears, feet, upper back and harder to reach places. Keep in mind that these amounts should be increased for larger body sizes.    Sunscreens with titanium dioxide and/or zinc oxide in the active ingredients are physical blockers as opposed to chemical blockers. Chemical-free sunscreens should not irritate the skin.    Spray-on sunscreens may be used for touch-up application only, not as a base layer. Also, use with caution around small children due to inhalation risk.    SPF means sun protection factor, which is just the degree to which the sunscreen can protect against UVB rays. There is no rating system for UVA rays. SPF is calculated as the time skin will burn when sunscreen is applied vs. skin without sunscreen.    Water resistant sunscreens should be re-applied every 1-2 hours.    Product Recommendations:    Consider use of sunscreen sticks with Zinc Oxide and Titanium Dioxide active ingredients such as Neutrogena Pure&Free Baby Sunscreen Stick.    Good examples include: Blue Lizard, EltaMD, Solbar    Good daily moisturizers with SPF: Vanicream, CeraVe.    For sensitive skin, consider : SkinMedica Essential Defense Mineral Shield Broad Spectrum SPF 35    Men: consider use of Neutrogena Triple Protect Facial Lotion    Avoid retinyl palmitate products.  Avoid combination products that include both sunscreen and insect repellant, as sunscreen should be applied every 2 hours, but insect repellant should not be applied as frequently.    For more information:  https://www.skincancer.org/prevention/sun-protection/sunscreen/sunscreens-safe-and-effective    SKIN CANCER SELF-EXAM INSTRUCTIONS  Check every month in the mirror or with  a household member. Be aware of any changes, especially bleeding or tenderness. Also, make sure to check your nails for color changes after removal of nail polish.    For melanoma, check for:  A - Asymmetry. One half unlike the other half.  B - Border. Irregular, scalloped, ragged, notched, blurred or poorly defined borders.  C - Color. Color variations from one area to another, with shades of tan, brown and/or black present. Sometimes white, red or blue.  D - Diameter. Greater than 6 mm (about the size of a pencil eraser). Any new growth of a mole should be concerning and be evaluated.  E - Evolving. A mole or skin lesion that looks different from the rest or is changing in size, shape or color.    For basal cell carcinoma and squamous cell carcinoma, check for:    Sores, shiny bumps, nodules, scaly lesions, or wart-like growths that are itchy, tender, crusting, scabbing, eroding, oozing or bleeding.    Open sores/wounds or reddish/irritated areas that do not heal within 2-3 weeks.    Scar-like areas that are white, yellow or waxy in color.      The patient was counseled about sunscreens and sun avoidance. The patient was counseled to check the skin regularly and report any lesion that is new, changing, itching, scabbing, bleeding or otherwise bothersome. The patient was discharged ambulatory and in stable condition.    TT: 25 minutes.  CT: 15 minutes.    The information in this document, created by the medical scribe for me, accurately reflects the services I personally performed and the decisions made by me. I have reviewed and approved this document for accuracy prior to leaving the patient care area.  April 10, 2019 8:58 AM  Sheila Carreno MD  [facility] [place]    Again, thank you for allowing me to participate in the care of your patient.        Sincerely,        Sheila Carreno MD

## 2019-04-10 NOTE — PATIENT INSTRUCTIONS
"FUTURE APPOINTMENTS  Follow up in 1 year(s) for a full-body skin cancer screening.  Follow up per pathology report.    WOUND CARE INSTRUCTIONS  1. Wash hands before every dressing change.  2. After 24 hours, change dressing daily.  3. Wash the wound area with a mild soap, then rinse.  4. Gently pat dry with a sterile gauze or Q-tip.  5. Using a Q-tip, apply Vaseline or Aquaphor only over entire wound. Do NOT use Neosporin - as many people react to neomycin.  6. Finally, cover with a bandage or sterile non-stick gauze with micropore paper tape.  7. Repeat once daily until wound has healed.      Soap, water and shampoo will not hurt this area.    Do not go swimming or take baths, but showering is encouraged.    Limit use of the area where the procedure was done for a few days to allow for optimal healing.    If you experience bleeding:  Wash hands and hold firm pressure on the area for 10 minutes without checking to see if the bleeding has stopped. \"Checking\" pulls off the protective wound clot and restarts the bleeding all over again. Re-apply pressure for 10 minutes if necessary to stop bleeding.  Use additional sterile gauze and tape to maintain pressure once bleeding has stopped.  If bleeding continues, then call back to clinic at [phone].    Signs of Infection:  Infection can occur in any area where skin has been disrupted.  If you notice persistent redness, swelling, colored drainage, increasing pain, fever or other signs of infection, please call us at: [phone] and ask to have me or my colleague paged. We will call you back to discuss.    Pathology Results:  You will be notified, generally via letter or MyChart, in approximately 10 days. If there is anything we need to discuss or further treatment needed, I will call you to discuss it.    PATIENT INFORMATION : WOUNDS  During the healing process you will notice a number of changes. All wounds develop a small halo of redness surrounding the wound.  " "This means healing is occurring. Severe itching with extensive redness usually indicates sensitivity to the ointment or bandage tape used to dress the wound.  You should call our office if this develops.      Swelling  and/or discoloration around your surgical site is common, particularly when performed around the eye.    All wounds normally drain.  The larger the wound the more drainage there will be.  After 7-10 days, you will notice the wound beginning to shrink and new skin will begin to grow.  The wound is healed when you can see skin has formed over the entire area.  A healed wound has a healthy, shiny look to the surface and is red to dark pink in color to normalize.  Wounds may take approximately 4-6 weeks to heal.  Larger wounds may take 6-8 weeks. After the wound is healed you may discontinue dressing changes.    You may experience a sensation of tightness as your wound heals. This is normal and will gradually subside.    Your healed wound may be sensitive to temperature changes. This sensitivity improves with time, but if you re having a lot of discomfort, try to avoid temperature extremes.    Patients frequently experience itching after their wound appears to have healed because of the continue healing under the skin.  Plain Vaseline will help relieve the itching.    SUN PROTECTION INSTRUCTIONS  Sun damage can lead to skin cancer and premature aging of the skin.      The best way to protect from sun damage to your skin is to avoid the sun during peak hours (10 am - 2 pm) even on overcast days.    Never use tanning beds. Tanning beds are associated with much higher risks of skin cancer.    All tanning damages the skin. Aim for ivory skin year round and you will have less trouble with your skin in years to come. There is no merit in getting \"a base tan\" before a warm weather vacation, as any tanning indicates your body's response to sun damage.    Stop smoking. Smokers have higher rates of skin cancer and " "also have premature skin wrinkling.    Use UPF sun-protective clothing, which while more expensive initially provides longer lasting coverage without having to worry about remembering to re-apply.  1. Wear a wide-brimmed hat and sunglasses.   2. Wear sun-protective clothing.  [employer] and other [employer] make sun protective clothing that are stylish, comfortable and cool.   [email or web address] and other [employer] make UV arm sleeves suitable for golfing, gardening and other activities.    Sunscreen instructions:  1. Use sunscreens with Zinc Oxide, Titanium Dioxide or Avobenzone to protect from UVA rays.  2. Use SPF 30-50+ to protect from UVB rays.  3. Re-apply every 2 hours even if water resistant.  4. Apply on your face every day even when cloudy and even in the winter. UVA \"aging rays\" penetrate window glass and are just as strong in the winter as in the summer.    FYI  You should use about 3 tablespoons of sunscreen to protect your whole body. Thus a typical eight ounce bottle of sunscreen should last 4 applications. Remember, that the SPF rating is compromised if you don t apply enough. Most people only apply 1/2 - 1/3 of the amount they need. Also don t forget areas such as your ears, feet, upper back and harder to reach places. Keep in mind that these amounts should be increased for larger body sizes.    Sunscreens with titanium dioxide and/or zinc oxide in the active ingredients are physical blockers as opposed to chemical blockers. Chemical-free sunscreens should not irritate the skin.    Spray-on sunscreens may be used for touch-up application only, not as a base layer. Also, use with caution around small children due to inhalation risk.    SPF means sun protection factor, which is just the degree to which the sunscreen can protect against UVB rays. There is no rating system for UVA rays. SPF is calculated as the time skin will burn when sunscreen is applied vs. skin without sunscreen.    Water " resistant sunscreens should be re-applied every 1-2 hours.    Product Recommendations:    Consider use of sunscreen sticks with Zinc Oxide and Titanium Dioxide active ingredients such as Neutrogena Pure&Free Baby Sunscreen Stick.    Good examples include: Blue Lizard, EltaMD, Solbar    Good daily moisturizers with SPF: Vanicream, CeraVe.    For sensitive skin, consider : Skin[employer]ica Essential Defense Mineral Shield Broad Spectrum SPF 35    Men: consider use of Neutrogena Triple Protect Facial Lotion    Avoid retinyl palmitate products.  Avoid combination products that include both sunscreen and insect repellant, as sunscreen should be applied every 2 hours, but insect repellant should not be applied as frequently.    For more information:  https://www.skincancer.org/prevention/sun-protection/sunscreen/sunscreens-safe-and-effective    SKIN CANCER SELF-EXAM INSTRUCTIONS  Check every month in the mirror or with a household member. Be aware of any changes, especially bleeding or tenderness. Also, make sure to check your nails for color changes after removal of nail polish.    For melanoma, check for:  A - Asymmetry. One half unlike the other half.  B - Border. Irregular, scalloped, ragged, notched, blurred or poorly defined borders.  C - Color. Color variations from one area to another, with shades of tan, brown and/or black present. Sometimes white, red or blue.  D - Diameter. Greater than 6 mm (about the size of a pencil eraser). Any new growth of a mole should be concerning and be evaluated.  E - Evolving. A mole or skin lesion that looks different from the rest or is changing in size, shape or color.    For basal cell carcinoma and squamous cell carcinoma, check for:    Sores, shiny bumps, nodules, scaly lesions, or wart-like growths that are itchy, tender, crusting, scabbing, eroding, oozing or bleeding.    Open sores/wounds or reddish/irritated areas that do not heal within 2-3 weeks.    Scar-like areas that are  white, yellow or waxy in color.

## 2019-04-12 LAB — COPATH REPORT: NORMAL

## 2019-04-15 ENCOUNTER — TELEPHONE (OUTPATIENT)
Dept: FAMILY MEDICINE | Facility: CLINIC | Age: 39
End: 2019-04-15

## 2019-04-15 NOTE — LETTER
April 16, 2019    Mehran Ibarra  [address]  [place] [address]        Dear Mehran,    Great news! The lesion(s) that was removed has been found to be benign (not cancer) and is called a(n) benign lesion. Thus, no further treatment is needed.      Thank you for allowing me to be involved in your health care and for choosing [facility].  If you have any questions or concerns please feel free to contact me at [phone].      Sincerely,      Sheila Carreno M.D.

## 2019-04-15 NOTE — TELEPHONE ENCOUNTER
Notes recorded by Sheila Carreno MD on 4/15/2019 at 2:35 PM CDT  Please call and let Mehran know that the lesion was benign ( not cancer ) .     Thank you,   Sheila Carreno M.D.

## 2019-04-15 NOTE — TELEPHONE ENCOUNTER
Called and LM for patient to call back in regards to biopsy results.    Renee RN-BSN  [facility]  [phone]

## 2019-06-17 PROBLEM — M41.9 SCOLIOSIS: Status: ACTIVE | Noted: 2017-10-01

## 2019-06-18 ENCOUNTER — OFFICE VISIT (OUTPATIENT)
Dept: FAMILY MEDICINE | Facility: CLINIC | Age: 39
End: 2019-06-18
Payer: COMMERCIAL

## 2019-06-18 VITALS
OXYGEN SATURATION: 96 % | SYSTOLIC BLOOD PRESSURE: 118 MMHG | DIASTOLIC BLOOD PRESSURE: 78 MMHG | BODY MASS INDEX: 25.31 KG/M2 | TEMPERATURE: 98.4 F | HEART RATE: 88 BPM | WEIGHT: 161.6 LBS

## 2019-06-18 DIAGNOSIS — J02.9 VIRAL PHARYNGITIS: ICD-10-CM

## 2019-06-18 DIAGNOSIS — J06.9 URI WITH COUGH AND CONGESTION: Primary | ICD-10-CM

## 2019-06-18 LAB
DEPRECATED S PYO AG THROAT QL EIA: NORMAL
SPECIMEN SOURCE: NORMAL

## 2019-06-18 PROCEDURE — 87880 STREP A ASSAY W/OPTIC: CPT | Performed by: PHYSICIAN ASSISTANT

## 2019-06-18 PROCEDURE — 99213 OFFICE O/P EST LOW 20 MIN: CPT | Performed by: PHYSICIAN ASSISTANT

## 2019-06-18 PROCEDURE — 87081 CULTURE SCREEN ONLY: CPT | Performed by: PHYSICIAN ASSISTANT

## 2019-06-18 NOTE — PATIENT INSTRUCTIONS

## 2019-06-18 NOTE — PROGRESS NOTES
Subjective     Mehran Ibarra is a 39 year old male who presents to clinic today with his father for the following health issues:    HPI   Acute Illness   Acute illness concerns: Cold and fever  Onset: 2 days    Fever: YES- last night but not today    Chills/Sweats: YES- last night    Headache (location?): no    Sinus Pressure:no    Conjunctivitis:  no    Ear Pain: no    Rhinorrhea: YES    Congestion: no    Sore Throat: YES     Cough: YES - worse yesterday    Wheeze: no    Decreased Appetite: no    Nausea: no    Vomiting: no    Diarrhea:  no    Dysuria/Freq.: no    Fatigue/Achiness: no    Sick/Strep Exposure: YES- his mother is sick, was in contact with her on friday     Therapies Tried and outcome: n/a    -Patient believes he had a fever last night, reports sore throat, cough, congestion, father states he broke out in a sweat  -Rates his sore throat as a 4/10  -His mother complained of a sore throat this morning, called in sick to work  -Denies diarrhea, nausea, vomiting, ear pain        Problem list and histories reviewed & adjusted, as indicated.  Additional history: as documented    ROS:  CONSTITUTIONAL: NEGATIVE for chills, change in weight, POSITIVE fever  INTEGUMENTARY/SKIN: NEGATIVE for worrisome rashes, moles or lesions  EYES: NEGATIVE for vision changes or irritation  ENT/MOUTH: NEGATIVE for ear, mouth problems, POSITIVE congestion, throat problems  RESP: NEGATIVE for SOB, POSITIVE cough  BREAST: NEGATIVE for masses, tenderness or discharge  CV: NEGATIVE for chest pain, palpitations or peripheral edema  GI: NEGATIVE for nausea, abdominal pain, heartburn, or change in bowel habits  : NEGATIVE for frequency, dysuria, or hematuria  MUSCULOSKELETAL: NEGATIVE for significant arthralgias or myalgia  NEURO: NEGATIVE for weakness, dizziness or paresthesias  ENDOCRINE: NEGATIVE for temperature intolerance, skin/hair changes  HEME: NEGATIVE for bleeding problems  PSYCHIATRIC: NEGATIVE for changes in mood or  affect    This document serves as a record of the services and decisions personally performed and made by Martha Garibay PA-C. It was created on her behalf by Fahad Perez, trained medical scribe. The creation of this document is based on the provider's statements to the medical scribe.  Fahad Perez 10:22 AM 2019    Patient Active Problem List   Diagnosis     GERD (gastroesophageal reflux disease)     IBS (irritable bowel syndrome)     Pectus excavatum     Attention deficit disorder     Generalized anxiety disorder     CARDIOVASCULAR SCREENING; LDL GOAL LESS THAN 160     Global developmental delay     Seborrheic dermatitis     Atypical nevus of back     Elevated blood pressure reading without diagnosis of hypertension     Bunion of great toe of left foot     Epithelial cyst     Scolioses     Impulse control disorder     Intellectual disability     Tobacco dependence in remission     Lung nodule, solitary     Hypertrophic cardiomyopathy (H)     Past Surgical History:   Procedure Laterality Date     HERNIA REPAIR, INGUINAL RT/LT      Hernia Repair, RT/LT     ingrown toenail       THORACOSCOPIC REPAIR PECTUS EXCAVATUM CHILD ([facility])       tooth removal         Social History     Tobacco Use     Smoking status: Former Smoker     Packs/day: 0.02     Years: 10.00     Pack years: 0.20     Types: Cigarettes, Cigars     Last attempt to quit: 2013     Years since quittin.3     Smokeless tobacco: Never Used   Substance Use Topics     Alcohol use: No     Family History   Problem Relation Age of Onset     Hypertension Father      Cardiovascular Mother      Heart Disease Maternal Grandmother      Diabetes Paternal Grandfather            Labs reviewed in EPIC  Patient Active Problem List   Diagnosis     GERD (gastroesophageal reflux disease)     IBS (irritable bowel syndrome)     Pectus excavatum     Attention deficit disorder     Generalized anxiety disorder     CARDIOVASCULAR SCREENING; LDL GOAL LESS THAN  160     Global developmental delay     Seborrheic dermatitis     Atypical nevus of back     Elevated blood pressure reading without diagnosis of hypertension     Bunion of great toe of left foot     Epithelial cyst     Scolioses     Impulse control disorder     Intellectual disability     Tobacco dependence in remission     Lung nodule, solitary     Hypertrophic cardiomyopathy (H)     Past Surgical History:   Procedure Laterality Date     HERNIA REPAIR, INGUINAL RT/LT      Hernia Repair, RT/LT     ingrown toenail       THORACOSCOPIC REPAIR PECTUS EXCAVATUM CHILD ([facility])       tooth removal         Social History     Tobacco Use     Smoking status: Former Smoker     Packs/day: 0.02     Years: 10.00     Pack years: 0.20     Types: Cigarettes, Cigars     Last attempt to quit: 2013     Years since quittin.3     Smokeless tobacco: Never Used   Substance Use Topics     Alcohol use: No     Family History   Problem Relation Age of Onset     Hypertension Father      Cardiovascular Mother      Heart Disease Maternal Grandmother      Diabetes Paternal Grandfather          Current Outpatient Medications   Medication Sig Dispense Refill     amphetamine-dextroamphetamine (ADDERALL) 5 MG tablet Take 1 tablet (5 mg) by mouth daily 5 tablet 0     citalopram (CELEXA) 10 MG tablet Take 1 tablet (10 mg) by mouth daily 5 tablet 0     guanFACINE (TENEX) 1 MG tablet Take 2 tablets (2 mg) by mouth At Bedtime (Patient taking differently: Take 1 mg by mouth At Bedtime ) 10 tablet 0     ketoconazole (NIZORAL) 2 % cream Apply topically 2 times daily as needed for itching 30 g 1       OBJECTIVE:                                                    /78   Pulse 88   Temp 98.4  F (36.9  C) (Temporal)   Wt 73.3 kg (161 lb 9.6 oz)   SpO2 96%   BMI 25.31 kg/m   Body mass index is 25.31 kg/m .   GENERAL:: healthy, alert and no distress  EYES: Eyes grossly normal to inspection, extraocular movements - intact, and PERRL  HENT: ear  canals- normal; TMs- normal; Nose- normal; Mouth- no ulcers, no lesions, mild oropharyngeal erythema  NECK: no tenderness, no adenopathy, no asymmetry, no masses, no stiffness; thyroid- normal to palpation  RESP: lungs clear to auscultation - no rales, no rhonchi, no wheezes  CV: regular rates and rhythm, normal S1 S2, no S3 or S4 and no murmur, no click or rub -  MS: extremities- no gross deformities noted, no edema  SKIN: no suspicious lesions, no rashes  NEURO: strength and tone- normal, sensory exam- grossly normal, mentation- intact, speech- normal  PSYCH: Alert and oriented times 3; speech- coherent , normal rate and volume; able to articulate logical thoughts, able to abstract reason, no tangential thoughts, no hallucinations or delusions, affect- normal    Results for orders placed or performed in visit on 06/18/19 (from the past 24 hour(s))   Strep, Rapid Screen   Result Value Ref Range    Specimen Description Throat     Rapid Strep A Screen       NEGATIVE: No Group A streptococcal antigen detected by immunoassay, await culture report.          ASSESSMENT/PLAN:                                                        ICD-10-CM    1. URI with cough and congestion J06.9 Strep, Rapid Screen     Beta strep group A culture   2. Viral pharyngitis J02.9          Patient Instructions       Patient Education     Viral Upper Respiratory Illness (Adult)    You have a viral upper respiratory illness (URI), which is another term for the common cold. This illness is contagious during the first few days. It is spread through the air by coughing and sneezing. It may also be spread by direct contact (touching the sick person and then touching your own eyes, nose, or mouth). Frequent handwashing will decrease risk of spread. Most viral illnesses go away within 7 to 10 days with rest and simple home remedies. Sometimes the illness may last for several weeks. Antibiotics will not kill a virus, and they are generally not  prescribed for this condition.  Home care    If symptoms are severe, rest at home for the first 2 to 3 days. When you resume activity, don't let yourself get too tired.    Don't smoke. If you need help stopping, talk with your healthcare provider.    Avoid being exposed to cigarette smoke (yours or others ).    You may use acetaminophen or ibuprofen to control pain and fever, unless another medicine was prescribed. If you have chronic liver or kidney disease, have ever had a stomach ulcer or gastrointestinal bleeding, or are taking blood-thinning medicines, talk with your healthcare provider before using these medicines. Aspirin should never be given to anyone under 18 years of age who is ill with a viral infection or fever. It may cause severe liver or brain damage.    Your appetite may be poor, so a light diet is fine. Stay well hydrated by drinking 6 to 8 glasses of fluids per day (water, soft drinks, juices, tea, or soup). Extra fluids will help loosen secretions in the nose and lungs.    Over-the-counter cold medicines will not shorten the length of time you re sick, but they may be helpful for the following symptoms: cough, sore throat, and nasal and sinus congestion. If you take prescription medicines, ask your healthcare provider or pharmacist which over-the-counter medicines are safe to use. (Note: Don't use decongestants if you have high blood pressure.)  Follow-up care  Follow up with your healthcare provider, or as advised.  When to seek medical advice  Call your healthcare provider right away if any of these occur:    Cough with lots of colored sputum (mucus)    Severe headache; face, neck, or ear pain    Difficulty swallowing due to throat pain    Fever of 100.4 F (38 C) or higher, or as directed by your healthcare provider  Call 911  Call 911 if any of these occur:    Chest pain, shortness of breath, wheezing, or difficulty breathing    Coughing up blood    Very severe pain with swallowing, especially  "if it goes along with a muffled voice   Date Last Reviewed: 6/1/2018 2000-2018 [employer]. [address] [place] [address], [place] [address]. All rights reserved. This information is not intended as a substitute for professional medical care. Always follow your healthcare professional's instructions.                 Estimated body mass index is 25.31 kg/m  as calculated from the following:    Height as of 9/12/18: 1.702 m (5' 7\").    Weight as of this encounter: 73.3 kg (161 lb 9.6 oz).       The information in this document, created by the medical scribe for me, accurately reflects the services I personally performed and the decisions made by me. I have reviewed and approved this document for accuracy prior to leaving the patient care area.  June 18, 2019 10:22 AM    Martha Garibay  [facility]    "

## 2019-06-19 LAB
BACTERIA SPEC CULT: NORMAL
SPECIMEN SOURCE: NORMAL

## 2019-09-30 ENCOUNTER — OFFICE VISIT (OUTPATIENT)
Dept: FAMILY MEDICINE | Facility: CLINIC | Age: 39
End: 2019-09-30
Payer: COMMERCIAL

## 2019-09-30 VITALS
TEMPERATURE: 98.5 F | BODY MASS INDEX: 25.36 KG/M2 | HEIGHT: 67 IN | DIASTOLIC BLOOD PRESSURE: 68 MMHG | HEART RATE: 72 BPM | OXYGEN SATURATION: 98 % | WEIGHT: 161.6 LBS | SYSTOLIC BLOOD PRESSURE: 124 MMHG

## 2019-09-30 DIAGNOSIS — F63.9 IMPULSE CONTROL DISORDER: ICD-10-CM

## 2019-09-30 DIAGNOSIS — Z23 NEED FOR PROPHYLACTIC VACCINATION AND INOCULATION AGAINST INFLUENZA: ICD-10-CM

## 2019-09-30 DIAGNOSIS — Z00.00 ROUTINE GENERAL MEDICAL EXAMINATION AT A HEALTH CARE FACILITY: Primary | ICD-10-CM

## 2019-09-30 DIAGNOSIS — Z00.00 PREVENTATIVE HEALTH CARE: ICD-10-CM

## 2019-09-30 DIAGNOSIS — F90.0 ATTENTION DEFICIT HYPERACTIVITY DISORDER (ADHD), PREDOMINANTLY INATTENTIVE TYPE: ICD-10-CM

## 2019-09-30 DIAGNOSIS — F79 INTELLECTUAL DISABILITY: ICD-10-CM

## 2019-09-30 PROCEDURE — 90686 IIV4 VACC NO PRSV 0.5 ML IM: CPT | Performed by: FAMILY MEDICINE

## 2019-09-30 PROCEDURE — 99395 PREV VISIT EST AGE 18-39: CPT | Mod: 25 | Performed by: FAMILY MEDICINE

## 2019-09-30 PROCEDURE — 90471 IMMUNIZATION ADMIN: CPT | Performed by: FAMILY MEDICINE

## 2019-09-30 PROCEDURE — 99213 OFFICE O/P EST LOW 20 MIN: CPT | Mod: 25 | Performed by: FAMILY MEDICINE

## 2019-09-30 ASSESSMENT — MIFFLIN-ST. JEOR: SCORE: 1610.6

## 2019-09-30 NOTE — PROGRESS NOTES
SUBJECTIVE:   CC: Mehran Ibarra is an 39 year old male who presents for preventive health visit.     Healthy Habits:    Do you get at least three servings of calcium containing foods daily (dairy, green leafy vegetables, etc.)? yes    Amount of exercise or daily activities, outside of work: trys to, [facility], walks a lot     Problems taking medications regularly No    Medication side effects: No    Have you had an eye exam in the past two years? yes    Do you see a dentist twice per year? yes    Do you have sleep apnea, excessive snoring or daytime drowsiness?no    Tobacco Use  Patient quit smoking a few months ago, has been doing well with this.    Psych  He sees a psychiatrist who prescribes his psychiatric medications. He is very compliant with his meds, if he does not take them he experiences some impulsivity.      Today's PHQ-2 Score:   PHQ-2 (  [employer]) 2019   Q1: Little interest or pleasure in doing things 0 0   Q2: Feeling down, depressed or hopeless 0 0   PHQ-2 Score 0 0       Abuse: Current or Past(Physical, Sexual or Emotional)- No  Do you feel safe in your environment? No    Social History     Tobacco Use     Smoking status: Former Smoker     Packs/day: 0.02     Years: 10.00     Pack years: 0.20     Types: Cigarettes, Cigars     Last attempt to quit: 2013     Years since quittin.5     Smokeless tobacco: Never Used   Substance Use Topics     Alcohol use: No     If you drink alcohol do you typically have >3 drinks per day or >7 drinks per week? No                      Last PSA: No results found for: PSA    Reviewed orders with patient. Reviewed health maintenance and updated orders accordingly - Yes  Labs reviewed in EPIC  BP Readings from Last 3 Encounters:   19 124/68   19 118/78   04/10/19 118/82    Wt Readings from Last 3 Encounters:   19 73.3 kg (161 lb 9.6 oz)   19 73.3 kg (161 lb 9.6 oz)   19 75.6 kg (166 lb 9.6 oz)                   Patient Active Problem List   Diagnosis     GERD (gastroesophageal reflux disease)     IBS (irritable bowel syndrome)     Pectus excavatum     Attention deficit disorder     Generalized anxiety disorder     CARDIOVASCULAR SCREENING; LDL GOAL LESS THAN 160     Global developmental delay     Seborrheic dermatitis     Atypical nevus of back     Elevated blood pressure reading without diagnosis of hypertension     Bunion of great toe of left foot     Epithelial cyst     Scolioses     Impulse control disorder     Intellectual disability     Tobacco dependence in remission     Lung nodule, solitary     Hypertrophic cardiomyopathy (H)     Past Surgical History:   Procedure Laterality Date     HERNIA REPAIR, INGUINAL RT/LT      Hernia Repair, RT/LT     ingrown toenail       THORACOSCOPIC REPAIR PECTUS EXCAVATUM CHILD ([facility])       tooth removal         Social History     Tobacco Use     Smoking status: Former Smoker     Packs/day: 0.02     Years: 10.00     Pack years: 0.20     Types: Cigarettes, Cigars     Last attempt to quit: 2013     Years since quittin.5     Smokeless tobacco: Never Used   Substance Use Topics     Alcohol use: No     Family History   Problem Relation Age of Onset     Hypertension Father      Cardiovascular Mother      Heart Disease Maternal Grandmother      Diabetes Paternal Grandfather          Current Outpatient Medications   Medication Sig Dispense Refill     amphetamine-dextroamphetamine (ADDERALL) 5 MG tablet Take 1 tablet (5 mg) by mouth daily 5 tablet 0     citalopram (CELEXA) 10 MG tablet Take 1 tablet (10 mg) by mouth daily 5 tablet 0     guanFACINE (TENEX) 1 MG tablet Take 2 tablets (2 mg) by mouth At Bedtime (Patient taking differently: Take 1 mg by mouth At Bedtime ) 10 tablet 0     ketoconazole (NIZORAL) 2 % cream Apply topically 2 times daily as needed for itching 30 g 1     No Known Allergies    Reviewed and updated as needed this visit by clinical staff  Allergies   "Meds  Problems         Reviewed and updated as needed this visit by Provider  Allergies  Meds  Problems        Past Medical History:   Diagnosis Date     CARDIOVASCULAR SCREENING; LDL GOAL LESS THAN 160 6/9/2012     Elevated blood pressure reading without diagnosis of hypertension 8/21/2015     Generalized anxiety disorder 6/8/2012     Diagnosis updated by automated process. Provider to review and confirm.     GERD (gastroesophageal reflux disease) 3/8/2011     Global developmental delay      IBS (irritable bowel syndrome) 3/8/2011    With constipation      Left ventricular hypertrophy 10/1/2017    Echo 9/17, referral Dr Lopez annual followup, and prn. MR in future.     Pectus excavatum 6/8/2012     Scolioses      Undiagnosed cardiac murmurs 9/11/2017      Past Surgical History:   Procedure Laterality Date     HERNIA REPAIR, INGUINAL RT/LT      Hernia Repair, RT/LT     ingrown toenail       THORACOSCOPIC REPAIR PECTUS EXCAVATUM CHILD ([facility])       tooth removal         ROS:  Denies headache, insomnia, chest pain, shortness of breath, cough, heartburn, bowel issues, bladder issues, neck pain, back pain, hip pain, knee pain, ankle pain, or foot pain. Remainder of ROS is negative unless otherwise noted above or in HPI.    This document serves as a record of the services and decisions personally performed and made by Arpan Flores MD. It was created on his behalf by Fahad Perez, trained medical scribe. The creation of this document is based on the provider's statements to the medical scribe.  Fahad Perez 7:17 AM October 1, 2019    OBJECTIVE:   /68   Pulse 72   Temp 98.5  F (36.9  C) (Oral)   Ht 1.708 m (5' 7.25\")   Wt 73.3 kg (161 lb 9.6 oz)   SpO2 98%   BMI 25.12 kg/m    EXAM:  GENERAL: healthy, alert and no distress, speaks with a slight speech impediment that is longstanding  EYES: Eyes grossly normal to inspection, PERRL and conjunctivae and sclerae normal  HENT: ear canals and TM's normal, nose " and mouth without ulcers or lesions  NECK: no adenopathy, no asymmetry, masses, or scars and thyroid normal to palpation  RESP: lungs clear to auscultation - no rales, rhonchi or wheezes  CV: regular rate and rhythm, normal S1 S2, no S3 or S4, no murmur, click or rub, no peripheral edema and peripheral pulses strong  ABDOMEN: soft, nontender, no hepatosplenomegaly, no masses and bowel sounds normal   (male): normal male genitalia without lesions or urethral discharge, no hernia  MS: no gross musculoskeletal defects noted, no edema  SKIN: no suspicious lesions or rashes, post surgical scarring well healed from pectus surgery  NEURO: Normal strength and tone, mentation intact and speech normal  PSYCH: mentation appears normal, affect normal/bright    Diagnostic Test Results:  Labs reviewed in Epic  No results found for this or any previous visit (from the past 24 hour(s)).    ASSESSMENT/PLAN:   (Z00.00) Routine general medical examination at a health care facility  (primary encounter diagnosis)  Comment: Patient is doing well. Routine physical completed.  Plan: Follow up as needed.    (F63.9) Impulse control disorder  Comment: Stable. Controlled by current medication.  Plan: Continue taking medication. Follow up as needed.    (F79) Intellectual disability  Comment: Stable.  Plan: Monitoring. Follow up as needed.    (F90.0) Attention deficit hyperactivity disorder (ADHD), predominantly inattentive type  Comment: Stable. Controlled by current medication.  Plan: Continue taking medication. Follow up as needed.    (Z23) Need for prophylactic vaccination and inoculation against influenza  Comment: Patient agrees to vaccine.  Plan: INFLUENZA VACCINE IM > 6 MONTHS VALENT IIV4         [74613]        Follow up as needed.        Patient Instructions     Preventive Health Recommendations  Male Ages 26 - 39    Yearly exam:             See your health care provider every year in order to  o   Review health changes.   o   Discuss  "preventive care.    o   Review your medicines if your doctor has prescribed any.    You should be tested each year for STDs (sexually transmitted diseases), if you re at risk.     After age 35, talk to your provider about cholesterol testing. If you are at risk for heart disease, have your cholesterol tested at least every 5 years.     If you are at risk for diabetes, you should have a diabetes test (fasting glucose).  Shots: Get a flu shot each year. Get a tetanus shot every 10 years.     Nutrition:    Eat at least 5 servings of fruits and vegetables daily.     Eat whole-grain bread, whole-wheat pasta and brown rice instead of white grains and rice.     Get adequate Calcium and Vitamin D.     Lifestyle    Exercise for at least 150 minutes a week (30 minutes a day, 5 days a week). This will help you control your weight and prevent disease.     Limit alcohol to one drink per day.     No smoking.     Wear sunscreen to prevent skin cancer.     See your dentist every six months for an exam and cleaning.           COUNSELING:  Reviewed preventive health counseling, as reflected in patient instructions    Estimated body mass index is 25.12 kg/m  as calculated from the following:    Height as of this encounter: 1.708 m (5' 7.25\").    Weight as of this encounter: 73.3 kg (161 lb 9.6 oz).       reports that he quit smoking about 6 years ago. His smoking use included cigarettes and cigars. He has a 0.20 pack-year smoking history. He has never used smokeless tobacco.      The information in this document, created by the medical scribe for me, accurately reflects the services I personally performed and the decisions made by me. I have reviewed and approved this document for accuracy prior to leaving the patient care area.  October 1, 2019 7:22 AM    Arpan Flores MD  [facility] [place]  "

## 2020-10-12 ENCOUNTER — VIRTUAL VISIT (OUTPATIENT)
Dept: FAMILY MEDICINE | Facility: CLINIC | Age: 40
End: 2020-10-12
Payer: COMMERCIAL

## 2020-10-12 DIAGNOSIS — Z20.822 EXPOSURE TO COVID-19 VIRUS: Primary | ICD-10-CM

## 2020-10-12 PROCEDURE — 99213 OFFICE O/P EST LOW 20 MIN: CPT | Mod: 95 | Performed by: PHYSICIAN ASSISTANT

## 2020-10-12 NOTE — PROGRESS NOTES
"Mehran Ibarra is a 40 year old male who is being evaluated via a billable telephone visit.      The patient has been notified of following:     \"This telephone visit will be conducted via a call between you and your physician/provider. We have found that certain health care needs can be provided without the need for a physical exam.  This service lets us provide the care you need with a short phone conversation.  If a prescription is necessary we can send it directly to your pharmacy.  If lab work is needed we can place an order for that and you can then stop by our lab to have the test done at a later time.    Telephone visits are billed at different rates depending on your insurance coverage. During this emergency period, for some insurers they may be billed the same as an in-person visit.  Please reach out to your insurance provider with any questions.    If during the course of the call the physician/provider feels a telephone visit is not appropriate, you will not be charged for this service.\"    Patient has given verbal consent for Telephone visit?  Yes    What phone number would you like to be contacted at? [phone]    How would you like to obtain your AVS? MyChart    Subjective     Mehran Ibarra is a 40 year old male who presents via phone visit today for the following health issues:    HPI     Pt exposed to COVID on 10/5/2020-Not showing any symptoms currently  Mom reports he's been with his parents              Review of Systems   CONSTITUTIONAL: NEGATIVE for fever, chills, change in weight  INTEGUMENTARY/SKIN: NEGATIVE for worrisome rashes, moles or lesions  EYES: NEGATIVE for vision changes or irritation  ENT/MOUTH: NEGATIVE for ear, mouth and throat problems  RESP: NEGATIVE for significant cough or SOB  CV: NEGATIVE for chest pain, palpitations or peripheral edema  GI: NEGATIVE for nausea, abdominal pain, heartburn, or change in bowel habits  : NEGATIVE for frequency, dysuria, or " hematuria  MUSCULOSKELETAL: NEGATIVE for significant arthralgias or myalgia  NEURO: NEGATIVE for weakness, dizziness or paresthesias  ENDOCRINE: NEGATIVE for temperature intolerance, skin/hair changes  HEME: NEGATIVE for bleeding problems  PSYCHIATRIC: NEGATIVE for changes in mood or affect       Objective          Vitals:  No vitals were obtained today due to virtual visit.    healthy, alert and no distress  PSYCH: Alert and oriented times 3; coherent speech, normal   rate and volume, able to articulate logical thoughts, able   to abstract reason, no tangential thoughts, no hallucinations   or delusions  His affect is normal  RESP: No cough, no audible wheezing, able to talk in full sentences  Remainder of exam unable to be completed due to telephone visits            Assessment/Plan:      ICD-10-CM    1. Exposure to COVID-19 virus  Z20.828 Asymptomatic COVID-19 Virus (Coronavirus) by PCR         Phone call duration:  5 minutes

## 2020-10-13 ENCOUNTER — AMBULATORY - HEALTHEAST (OUTPATIENT)
Dept: FAMILY MEDICINE | Facility: CLINIC | Age: 40
End: 2020-10-13

## 2020-10-13 DIAGNOSIS — Z20.822 EXPOSURE TO COVID-19 VIRUS: ICD-10-CM

## 2020-10-14 ENCOUNTER — AMBULATORY - HEALTHEAST (OUTPATIENT)
Dept: FAMILY MEDICINE | Facility: CLINIC | Age: 40
End: 2020-10-14

## 2020-10-14 DIAGNOSIS — Z20.822 EXPOSURE TO COVID-19 VIRUS: ICD-10-CM

## 2020-10-15 ENCOUNTER — TELEPHONE (OUTPATIENT)
Dept: FAMILY MEDICINE | Facility: CLINIC | Age: 40
End: 2020-10-15

## 2020-10-15 NOTE — TELEPHONE ENCOUNTER
Route to MA pool    Please assist pt with form    Thanks  Cher Saldivar RN   [facility]

## 2020-10-15 NOTE — TELEPHONE ENCOUNTER
Reason for call:  Form   Our goal is to have forms completed within 72 hours, however some forms may require a visit or additional information.     Who is the form from? Patient  Where did the form come from? Patient or family brought in     What clinic location was the form placed at? [facility]  Where was the form placed? Given to MA/RN  What number is listed as a contact on the form? [phone]    Phone call message - patient request for a letter, form or note:     Date needed: as soon as possible  Please call the patient when completed  Has the patient signed a consent form for release of information? YES    Additional comments: n/a    Type of letter, form or note: other    Phone number to reach patient:  Home number on file [phone] (home)    Best Time:  n/a    Can we leave a detailed message on this number?  YES    Travel screening: Not Applicable

## 2020-10-16 ENCOUNTER — COMMUNICATION - HEALTHEAST (OUTPATIENT)
Dept: SCHEDULING | Facility: CLINIC | Age: 40
End: 2020-10-16

## 2020-10-26 ENCOUNTER — TELEPHONE (OUTPATIENT)
Dept: FAMILY MEDICINE | Facility: CLINIC | Age: 40
End: 2020-10-26

## 2020-10-26 NOTE — LETTER
[address] [address]  [phone]          October 26, 2020    Mehran Ibarra                                                                                                                     [address]  [place] [address]            To whom it may concern:    Re: Mehran Laurent has been under my medical care. It is my medical opinion and based on my medical judgement that he be allowed to have Sonny Susy as his caregiver, and that it would be best for him to live with Sonny as his new caregiver. This is the least restrictive alternative and meets the goal of person centered planning.        Sincerely,           Arpan Flores MD

## 2020-10-26 NOTE — TELEPHONE ENCOUNTER
Dr. Flores,    Received paperwork from patients mother regarding letter for changes in health care directive and living arrangements.  Letter cued for provider review. Read and revise as needed.    Thanks,  Connie Marshall RN   [facility] [place]

## 2020-10-28 NOTE — TELEPHONE ENCOUNTER
Audrey,    I gave you this paperwork to update file for patients health care directive, also Dr. Flores did the letter. Not sure if he printed it already. Information where to send was with the paperwork I gave you.    Thanks  Connie Marshall RN   [facility]

## 2020-10-28 NOTE — TELEPHONE ENCOUNTER
Pt would like the letter to be faxed to [phone] attstarr Brannon with [facility]

## 2020-11-03 ENCOUNTER — TRANSFERRED RECORDS (OUTPATIENT)
Dept: HEALTH INFORMATION MANAGEMENT | Facility: CLINIC | Age: 40
End: 2020-11-03

## 2020-11-04 ENCOUNTER — OFFICE VISIT (OUTPATIENT)
Dept: FAMILY MEDICINE | Facility: CLINIC | Age: 40
End: 2020-11-04
Payer: COMMERCIAL

## 2020-11-04 VITALS
BODY MASS INDEX: 26.58 KG/M2 | DIASTOLIC BLOOD PRESSURE: 62 MMHG | WEIGHT: 171 LBS | SYSTOLIC BLOOD PRESSURE: 108 MMHG | TEMPERATURE: 97 F | OXYGEN SATURATION: 98 % | HEART RATE: 62 BPM

## 2020-11-04 DIAGNOSIS — Z00.00 ENCOUNTER FOR MEDICARE ANNUAL WELLNESS EXAM: Primary | ICD-10-CM

## 2020-11-04 DIAGNOSIS — F88 GLOBAL DEVELOPMENTAL DELAY: ICD-10-CM

## 2020-11-04 DIAGNOSIS — F90.2 ATTENTION DEFICIT HYPERACTIVITY DISORDER (ADHD), COMBINED TYPE: ICD-10-CM

## 2020-11-04 DIAGNOSIS — F41.1 GENERALIZED ANXIETY DISORDER: ICD-10-CM

## 2020-11-04 PROCEDURE — 99213 OFFICE O/P EST LOW 20 MIN: CPT | Mod: 25 | Performed by: FAMILY MEDICINE

## 2020-11-04 PROCEDURE — 99396 PREV VISIT EST AGE 40-64: CPT | Performed by: FAMILY MEDICINE

## 2020-11-04 RX ORDER — GUANFACINE 1 MG/1
1 TABLET ORAL AT BEDTIME
COMMUNITY
Start: 2020-11-04 | End: 2022-11-28

## 2020-11-04 ASSESSMENT — ACTIVITIES OF DAILY LIVING (ADL)
CURRENT_FUNCTION: SHOPPING REQUIRES ASSISTANCE
CURRENT_FUNCTION: MEDICATION ADMINISTRATION REQUIRES ASSISTANCE
CURRENT_FUNCTION: TRANSPORTATION REQUIRES ASSISTANCE
CURRENT_FUNCTION: MONEY MANAGEMENT REQUIRES ASSISTANCE

## 2020-11-04 NOTE — PROGRESS NOTES
SUBJECTIVE:   CC: Mehran Ibarra is an 40 year old male who presents for preventative health visit.     {Split Bill scripting  The purpose of this visit is to discuss your medical history and prevent health problems before you are sick. You may be responsible for a co-pay, coinsurance, or deductible if your visit today includes services such as checking on a sore throat, having an x-ray or lab test, or treating and evaluating a new or existing condition :186475}  Patient has been advised of split billing requirements and indicates understanding: {Yes and No:451655}  HPI     {Add if <65 person on Medicare  - Required Questions (Optional):892464}  {Outside tests to abstract? :489195}    {additional problems to add (Optional):622446}    Today's PHQ-2 Score:   PHQ-2 (  [employer]) 2019   Q1: Little interest or pleasure in doing things 0   Q2: Feeling down, depressed or hopeless 0   PHQ-2 Score 0       Abuse: Current or Past(Physical, Sexual or Emotional)- { :432075}  Do you feel safe in your environment? { :746865}        Social History     Tobacco Use     Smoking status: Former Smoker     Packs/day: 0.02     Years: 10.00     Pack years: 0.20     Types: Cigarettes, Cigars     Quit date: 2013     Years since quittin.6     Smokeless tobacco: Never Used   Substance Use Topics     Alcohol use: No     {Rooming Staff- Complete this question if Prescreen response is not shown below for today's visit. If you drink alcohol do you typically have >3 drinks per day or >7 drinks per week? (Optional):012090}    Alcohol Use 2017   Prescreen: >3 drinks/day or >7 drinks/week? The patient does not drink >3 drinks per day nor >7 drinks per week.   {add AUDIT responses (Optional) (A score of 7 for adult men is an indication of hazardous drinking; a score of 8 or more is an indication of an alcohol use disorder.  A score of 7 or more for adult women is an indication of hazardous drinking or an alchohol use  "disorder):745071}    Last PSA: No results found for: PSA    Reviewed orders with patient. Reviewed health maintenance and updated orders accordingly - { :183645::\"Yes\"}  {Chronicprobdata (optional):467634}    Reviewed and updated as needed this visit by clinical staff                 Reviewed and updated as needed this visit by Provider                {HISTORY OPTIONS (Optional):590793}    Review of Systems  {MALE ROS (Optional):695089::\"CONSTITUTIONAL: NEGATIVE for fever, chills, change in weight\",\"INTEGUMENTARY/SKIN: NEGATIVE for worrisome rashes, moles or lesions\",\"EYES: NEGATIVE for vision changes or irritation\",\"ENT: NEGATIVE for ear, mouth and throat problems\",\"RESP: NEGATIVE for significant cough or SOB\",\"CV: NEGATIVE for chest pain, palpitations or peripheral edema\",\"GI: NEGATIVE for nausea, abdominal pain, heartburn, or change in bowel habits\",\" male: negative for dysuria, hematuria, decreased urinary stream, erectile dysfunction, urethral discharge\",\"MUSCULOSKELETAL: NEGATIVE for significant arthralgias or myalgia\",\"NEURO: NEGATIVE for weakness, dizziness or paresthesias\",\"PSYCHIATRIC: NEGATIVE for changes in mood or affect\"}    OBJECTIVE:   There were no vitals taken for this visit.    Physical Exam  {Exam Choices (Optional):055468}    {Diagnostic Test Results (Optional):426142::\"Diagnostic Test Results:\",\"Labs reviewed in Epic\"}    ASSESSMENT/PLAN:   {Diag Picklist:214640}    Patient has been advised of split billing requirements and indicates understanding: {YES / NO:329519::\"Yes\"}  COUNSELING:   {MALE COUNSELING MESSAGES:744793::\"Reviewed preventive health counseling, as reflected in patient instructions\"}    Estimated body mass index is 25.12 kg/m  as calculated from the following:    Height as of 9/30/19: 1.708 m (5' 7.25\").    Weight as of 9/30/19: 73.3 kg (161 lb 9.6 oz).     {Weight Management Plan (ACO) Complete if BMI is abnormal-  Ages 18-64  BMI >24.9.  Age 65+ with BMI <23 or >30 " (Optional):963764}    He reports that he quit smoking about 7 years ago. His smoking use included cigarettes and cigars. He has a 0.20 pack-year smoking history. He has never used smokeless tobacco.      Counseling Resources:  ATP IV Guidelines  Pooled Cohorts Equation Calculator  FRAX Risk Assessment  ICSI Preventive Guidelines  Dietary Guidelines for Americans, 2010  USDA's MyPlate  ASA Prophylaxis  Lung CA Screening    Arpan Flores MD  [facility] [place]

## 2020-11-04 NOTE — PATIENT INSTRUCTIONS
Patient Education   Personalized Prevention Plan  You are due for the preventive services outlined below.  Your care team is available to assist you in scheduling these services.  If you have already completed any of these items, please share that information with your care team to update in your medical record.  Health Maintenance Due   Topic Date Due     HIV Screening  02/14/1995     Hepatitis C Screening  02/14/1998     PHQ-2  01/01/2020

## 2020-11-04 NOTE — PROGRESS NOTES
"SUBJECTIVE:   CC: Mehran Ibarra is an 40 year old male who presents for preventative health visit.     Patient has been advised of split billing requirements and indicates understanding: Yes  Healthy Habits:    Frequency of exercise:  None    Duration of exercise:  Less than 15 minutes    Do you usually eat at least 4 servings of fruit and vegetables a day, include whole grains    & fiber and avoid regularly eating high fat or \"junk\" foods?  Yes    Taking medications regularly:  Yes    Barriers to taking medications:  None    Medication side effects:  Other    Ability to successfully perform activities of daily living:  Money management requires assistance, shopping requires assistance, transportation requires assistance and medication administration requires assistance    Home Safety:  No safety concerns identified    Hearing Impairment:  No hearing concerns    In the past 6 months, have you been bothered by leaking of urine?  No    PHQ-2 Total Score:    Additional concerns today:  Yes (mole removal head take a look at it )    Ability to successfully perform activities of daily living: No, needs assistance with: companionship  Home safety:  none identified   Hearing impairment: Yes        Anxiety Follow-Up    How are you doing with your anxiety since your last visit? No change    Are you having other symptoms that might be associated with anxiety? Yes:  ADHD    Have you had a significant life event? Housing Concerns     Are you feeling depressed? No    Do you have any concerns with your use of alcohol or other drugs? No    Social History     Tobacco Use     Smoking status: Former Smoker     Packs/day: 0.02     Years: 10.00     Pack years: 0.20     Types: Cigarettes, Cigars     Quit date: 2013     Years since quittin.7     Smokeless tobacco: Never Used   Substance Use Topics     Alcohol use: No     Drug use: No     No flowsheet data found.  No flowsheet data found.      Today's PHQ-2 Score:   PHQ-2 " (  [employer]) 2019   Q1: Little interest or pleasure in doing things 0   Q2: Feeling down, depressed or hopeless 0   PHQ-2 Score 0       Abuse: Current or Past(Physical, Sexual or Emotional)- No  Do you feel safe in your environment? Yes        Social History     Tobacco Use     Smoking status: Former Smoker     Packs/day: 0.02     Years: 10.00     Pack years: 0.20     Types: Cigarettes, Cigars     Quit date: 2013     Years since quittin.7     Smokeless tobacco: Never Used   Substance Use Topics     Alcohol use: No         Alcohol Use 2017   Prescreen: >3 drinks/day or >7 drinks/week? The patient does not drink >3 drinks per day nor >7 drinks per week.       Last PSA: No results found for: PSA    Reviewed orders with patient. Reviewed health maintenance and updated orders accordingly - Yes  Lab work is in process  Labs reviewed in EPIC    Reviewed and updated as needed this visit by clinical staff  Tobacco   Meds   Med Hx  Surg Hx  Fam Hx  Soc Hx        Reviewed and updated as needed this visit by Provider                    Review of Systems  CONSTITUTIONAL: NEGATIVE for fever, chills, change in weight  INTEGUMENTARY/SKIN: NEGATIVE for worrisome rashes, moles or lesions  EYES: NEGATIVE for vision changes or irritation  ENT: NEGATIVE for ear, mouth and throat problems  RESP: NEGATIVE for significant cough or SOB  CV: NEGATIVE for chest pain, palpitations or peripheral edema  GI: NEGATIVE for nausea, abdominal pain, heartburn, or change in bowel habits   male: negative for dysuria, hematuria, decreased urinary stream, erectile dysfunction, urethral discharge  MUSCULOSKELETAL: NEGATIVE for significant arthralgias or myalgia  NEURO: NEGATIVE for weakness, dizziness or paresthesias  PSYCHIATRIC: NEGATIVE for changes in mood or affect    OBJECTIVE:   /62   Pulse 62   Temp 97  F (36.1  C) (Temporal)   Wt 77.6 kg (171 lb)   SpO2 98%   BMI 26.58 kg/m      Physical Exam  GENERAL:  "healthy, alert and no distress  EYES: Eyes grossly normal to inspection, PERRL and conjunctivae and sclerae normal  HENT: ear canals and TM's normal, nose and mouth without ulcers or lesions  NECK: no adenopathy, no asymmetry, masses, or scars and thyroid normal to palpation  RESP: lungs clear to auscultation - no rales, rhonchi or wheezes  CV: regular rate and rhythm, normal S1 S2, no S3 or S4, no murmur, click or rub, no peripheral edema and peripheral pulses strong  ABDOMEN: soft, nontender, no hepatosplenomegaly, no masses and bowel sounds normal   (male): normal male genitalia without lesions or urethral discharge, no hernia  MS: no gross musculoskeletal defects noted, no edema  SKIN: no suspicious lesions or rashes  NEURO: Normal strength and tone, mentation intact and speech normal  PSYCH: mentation appears normal, affect normal/bright    Diagnostic Test Results:  Labs reviewed in Epic    ASSESSMENT/PLAN:   1. Encounter for Medicare annual wellness exam  Doing well     2. Attention deficit hyperactivity disorder (ADHD), combined type  Needs this exact medication combo    3. Generalized anxiety disorder  At goal     4. Global developmental delay  Needs a change in housing . See paperwork in media      Patient has been advised of split billing requirements and indicates understanding:   COUNSELING:   Reviewed preventive health counseling, as reflected in patient instructions       Regular exercise       Healthy diet/nutrition    Estimated body mass index is 26.58 kg/m  as calculated from the following:    Height as of 9/30/19: 1.708 m (5' 7.25\").    Weight as of this encounter: 77.6 kg (171 lb).     Weight management plan: Discussed healthy diet and exercise guidelines    He reports that he quit smoking about 7 years ago. His smoking use included cigarettes and cigars. He has a 0.20 pack-year smoking history. He has never used smokeless tobacco.    Patient Instructions       Patient Education "   Personalized Prevention Plan  You are due for the preventive services outlined below.  Your care team is available to assist you in scheduling these services.  If you have already completed any of these items, please share that information with your care team to update in your medical record.  Health Maintenance Due   Topic Date Due     HIV Screening  02/14/1995     Hepatitis C Screening  02/14/1998     PHQ-2  01/01/2020     Arpan Flores MD  [facility] [place]

## 2020-11-19 ENCOUNTER — TRANSFERRED RECORDS (OUTPATIENT)
Dept: HEALTH INFORMATION MANAGEMENT | Facility: CLINIC | Age: 40
End: 2020-11-19

## 2021-10-23 ENCOUNTER — HEALTH MAINTENANCE LETTER (OUTPATIENT)
Age: 41
End: 2021-10-23

## 2021-12-18 ENCOUNTER — HEALTH MAINTENANCE LETTER (OUTPATIENT)
Age: 41
End: 2021-12-18

## 2022-01-19 ENCOUNTER — TRANSFERRED RECORDS (OUTPATIENT)
Dept: HEALTH INFORMATION MANAGEMENT | Facility: CLINIC | Age: 42
End: 2022-01-19
Payer: COMMERCIAL

## 2022-02-12 ENCOUNTER — HEALTH MAINTENANCE LETTER (OUTPATIENT)
Age: 42
End: 2022-02-12

## 2022-04-20 ENCOUNTER — TRANSFERRED RECORDS (OUTPATIENT)
Dept: HEALTH INFORMATION MANAGEMENT | Facility: CLINIC | Age: 42
End: 2022-04-20
Payer: COMMERCIAL

## 2022-05-05 ENCOUNTER — HOSPITAL ENCOUNTER (EMERGENCY)
Facility: CLINIC | Age: 42
Discharge: HOME | End: 2022-05-06
Attending: FAMILY MEDICINE | Admitting: FAMILY MEDICINE
Payer: COMMERCIAL

## 2022-05-05 ENCOUNTER — APPOINTMENT (OUTPATIENT)
Dept: GENERAL RADIOLOGY | Facility: CLINIC | Age: 42
End: 2022-05-05
Attending: FAMILY MEDICINE
Payer: COMMERCIAL

## 2022-05-05 DIAGNOSIS — U07.1 INFECTION DUE TO 2019 NOVEL CORONAVIRUS: ICD-10-CM

## 2022-05-05 LAB
ALBUMIN SERPL-MCNC: 4.5 G/DL (ref 3.4–5)
ALP SERPL-CCNC: 111 U/L (ref 40–150)
ALT SERPL W P-5'-P-CCNC: 52 U/L (ref 0–70)
ANION GAP SERPL CALCULATED.3IONS-SCNC: 10 MMOL/L (ref 3–14)
AST SERPL W P-5'-P-CCNC: 27 U/L (ref 0–45)
BASOPHILS # BLD AUTO: 0.1 10E3/UL (ref 0–0.2)
BASOPHILS NFR BLD AUTO: 0 %
BILIRUB SERPL-MCNC: 1.4 MG/DL (ref 0.2–1.3)
BUN SERPL-MCNC: 9 MG/DL (ref 7–30)
CALCIUM SERPL-MCNC: 9.4 MG/DL (ref 8.5–10.1)
CHLORIDE BLD-SCNC: 106 MMOL/L (ref 94–109)
CO2 SERPL-SCNC: 23 MMOL/L (ref 20–32)
CREAT SERPL-MCNC: 0.72 MG/DL (ref 0.66–1.25)
DEPRECATED S PYO AG THROAT QL EIA: NEGATIVE
EOSINOPHIL # BLD AUTO: 0 10E3/UL (ref 0–0.7)
EOSINOPHIL NFR BLD AUTO: 0 %
ERYTHROCYTE [DISTWIDTH] IN BLOOD BY AUTOMATED COUNT: 11.9 % (ref 10–15)
FLUAV RNA SPEC QL NAA+PROBE: NEGATIVE
FLUBV RNA RESP QL NAA+PROBE: NEGATIVE
GFR SERPL CREATININE-BSD FRML MDRD: >90 ML/MIN/1.73M2
GLUCOSE BLD-MCNC: 107 MG/DL (ref 70–99)
HCT VFR BLD AUTO: 43.4 % (ref 40–53)
HGB BLD-MCNC: 15.5 G/DL (ref 13.3–17.7)
HOLD SPECIMEN: NORMAL
IMM GRANULOCYTES # BLD: 0.1 10E3/UL
IMM GRANULOCYTES NFR BLD: 0 %
LACTATE SERPL-SCNC: 1.1 MMOL/L (ref 0.7–2)
LYMPHOCYTES # BLD AUTO: 0.8 10E3/UL (ref 0.8–5.3)
LYMPHOCYTES NFR BLD AUTO: 5 %
MCH RBC QN AUTO: 30 PG (ref 26.5–33)
MCHC RBC AUTO-ENTMCNC: 35.7 G/DL (ref 31.5–36.5)
MCV RBC AUTO: 84 FL (ref 78–100)
MONOCYTES # BLD AUTO: 1.2 10E3/UL (ref 0–1.3)
MONOCYTES NFR BLD AUTO: 8 %
NEUTROPHILS # BLD AUTO: 14.3 10E3/UL (ref 1.6–8.3)
NEUTROPHILS NFR BLD AUTO: 87 %
NRBC # BLD AUTO: 0 10E3/UL
NRBC BLD AUTO-RTO: 0 /100
PLATELET # BLD AUTO: 287 10E3/UL (ref 150–450)
POTASSIUM BLD-SCNC: 3.9 MMOL/L (ref 3.4–5.3)
PROCALCITONIN SERPL-MCNC: 0.06 NG/ML
PROT SERPL-MCNC: 8 G/DL (ref 6.8–8.8)
RBC # BLD AUTO: 5.17 10E6/UL (ref 4.4–5.9)
SARS-COV-2 RNA RESP QL NAA+PROBE: POSITIVE
SODIUM SERPL-SCNC: 139 MMOL/L (ref 133–144)
WBC # BLD AUTO: 16.5 10E3/UL (ref 4–11)

## 2022-05-05 PROCEDURE — 99284 EMERGENCY DEPT VISIT MOD MDM: CPT | Mod: CS | Performed by: FAMILY MEDICINE

## 2022-05-05 PROCEDURE — 36415 COLL VENOUS BLD VENIPUNCTURE: CPT | Performed by: EMERGENCY MEDICINE

## 2022-05-05 PROCEDURE — 85025 COMPLETE CBC W/AUTO DIFF WBC: CPT | Performed by: EMERGENCY MEDICINE

## 2022-05-05 PROCEDURE — 84145 PROCALCITONIN (PCT): CPT | Performed by: FAMILY MEDICINE

## 2022-05-05 PROCEDURE — 85025 COMPLETE CBC W/AUTO DIFF WBC: CPT | Performed by: FAMILY MEDICINE

## 2022-05-05 PROCEDURE — 83605 ASSAY OF LACTIC ACID: CPT | Performed by: FAMILY MEDICINE

## 2022-05-05 PROCEDURE — 71045 X-RAY EXAM CHEST 1 VIEW: CPT

## 2022-05-05 PROCEDURE — 87636 SARSCOV2 & INF A&B AMP PRB: CPT | Performed by: FAMILY MEDICINE

## 2022-05-05 PROCEDURE — C9803 HOPD COVID-19 SPEC COLLECT: HCPCS | Performed by: FAMILY MEDICINE

## 2022-05-05 PROCEDURE — 80053 COMPREHEN METABOLIC PANEL: CPT | Performed by: EMERGENCY MEDICINE

## 2022-05-05 PROCEDURE — 250N000013 HC RX MED GY IP 250 OP 250 PS 637: Performed by: FAMILY MEDICINE

## 2022-05-05 PROCEDURE — 83605 ASSAY OF LACTIC ACID: CPT | Performed by: EMERGENCY MEDICINE

## 2022-05-05 PROCEDURE — 80053 COMPREHEN METABOLIC PANEL: CPT | Performed by: FAMILY MEDICINE

## 2022-05-05 PROCEDURE — 87651 STREP A DNA AMP PROBE: CPT | Performed by: FAMILY MEDICINE

## 2022-05-05 PROCEDURE — 99284 EMERGENCY DEPT VISIT MOD MDM: CPT | Mod: 25,CS | Performed by: FAMILY MEDICINE

## 2022-05-05 PROCEDURE — 96360 HYDRATION IV INFUSION INIT: CPT | Performed by: FAMILY MEDICINE

## 2022-05-05 PROCEDURE — 258N000003 HC RX IP 258 OP 636: Performed by: FAMILY MEDICINE

## 2022-05-05 RX ORDER — SODIUM CHLORIDE 9 MG/ML
INJECTION, SOLUTION INTRAVENOUS CONTINUOUS
Status: DISCONTINUED | OUTPATIENT
Start: 2022-05-06 | End: 2022-05-06 | Stop reason: HOSPADM

## 2022-05-05 RX ORDER — ACETAMINOPHEN 500 MG
1000 TABLET ORAL ONCE
Status: COMPLETED | OUTPATIENT
Start: 2022-05-05 | End: 2022-05-05

## 2022-05-05 RX ADMIN — SODIUM CHLORIDE 1000 ML: 9 INJECTION, SOLUTION INTRAVENOUS at 23:27

## 2022-05-05 RX ADMIN — ACETAMINOPHEN 1000 MG: 500 TABLET ORAL at 23:21

## 2022-05-06 VITALS
HEART RATE: 86 BPM | TEMPERATURE: 98.1 F | SYSTOLIC BLOOD PRESSURE: 136 MMHG | OXYGEN SATURATION: 96 % | DIASTOLIC BLOOD PRESSURE: 86 MMHG | BODY MASS INDEX: 32.1 KG/M2 | WEIGHT: 170 LBS | RESPIRATION RATE: 16 BRPM | HEIGHT: 61 IN

## 2022-05-06 LAB — GROUP A STREP BY PCR: NOT DETECTED

## 2022-05-06 NOTE — ED TRIAGE NOTES
Triage Assessment     Row Name 05/05/22 2107       Triage Assessment (Adult)    Airway WDL WDL       Respiratory WDL    Respiratory WDL X;cough    Cough Frequency frequent    Cough Type productive       Skin Circulation/Temperature WDL    Skin Circulation/Temperature WDL X;WDL       Cardiac WDL    Cardiac WDL WDL       Peripheral/Neurovascular WDL    Peripheral Neurovascular WDL WDL       Cognitive/Neuro/Behavioral WDL    Cognitive/Neuro/Behavioral WDL WDL

## 2022-05-06 NOTE — DISCHARGE INSTRUCTIONS
Discharge to home with plan to monitor oxygen level with O2 sat monitor return if increased fevers weakness shortness of breath.  Use Tylenol and ibuprofen for fever and myalgias.

## 2022-05-06 NOTE — ED PROVIDER NOTES
ED Provider Note  [facility]      History     Chief Complaint   Patient presents with     Cough     Flu Symptoms     Shortness of Breath     Fever     HPI  Mehran Ibarra is a 42 year old male who presents emergency room with ongoing cough fevers chills myalgias for the past 2 to 3 days with concerns about the possibility of a COVID infection.  He has had temps approximately 101-102 patient denies any significant dehydration he states that he has been eating and drinking normally sleeping normally has not had a change in appetite no change in bowel or urine.    Past Medical History  Past Medical History:   Diagnosis Date     CARDIOVASCULAR SCREENING; LDL GOAL LESS THAN 160 6/9/2012     Elevated blood pressure reading without diagnosis of hypertension 8/21/2015     Generalized anxiety disorder 6/8/2012     Diagnosis updated by automated process. Provider to review and confirm.     GERD (gastroesophageal reflux disease) 3/8/2011     Global developmental delay      IBS (irritable bowel syndrome) 3/8/2011    With constipation      Left ventricular hypertrophy 10/1/2017    Echo 9/17, referral Dr Lopez annual followup, and prn. MR in future.     Pectus excavatum 6/8/2012     Scolioses      Undiagnosed cardiac murmurs 9/11/2017     Past Surgical History:   Procedure Laterality Date     HERNIA REPAIR, INGUINAL RT/LT      Hernia Repair, RT/LT     ingrown toenail       THORACOSCOPIC REPAIR PECTUS EXCAVATUM CHILD ([place])       tooth removal       amphetamine-dextroamphetamine (ADDERALL) 5 MG tablet  citalopram (CELEXA) 10 MG tablet  guanFACINE (TENEX) 1 MG tablet      No Known Allergies  Family History  Family History   Problem Relation Age of Onset     Hypertension Father      Cardiovascular Mother      Heart Disease Maternal Grandmother      Diabetes Paternal Grandfather      Social History   Social History     Tobacco Use     Smoking status: Former Smoker     Packs/day: 0.02     Years: 10.00      "Pack years: 0.20     Types: Cigarettes, Cigars     Quit date: 2013     Years since quittin.1     Smokeless tobacco: Never Used   Substance Use Topics     Alcohol use: No     Drug use: No      Past medical history, past surgical history, medications, allergies, family history, and social history were reviewed with the patient. No additional pertinent items.       Review of Systems  A complete review of systems was performed with pertinent positives and negatives noted in the HPI, and all other systems negative.    Physical Exam   BP: 132/72  Pulse: 110  Temp: (!) 101.7  F (38.7  C)  Resp: 20  Height: 154.4 cm (5' 0.8\")  Weight: 77.1 kg (170 lb)  SpO2: 95 %  Physical Exam  Constitutional:       General: He is not in acute distress.     Appearance: He is not diaphoretic.   HENT:      Head: Atraumatic.   Eyes:      General: No scleral icterus.     Pupils: Pupils are equal, round, and reactive to light.   Cardiovascular:      Heart sounds: Normal heart sounds.   Pulmonary:      Effort: No respiratory distress.      Breath sounds: Normal breath sounds. No wheezing or rhonchi.   Chest:      Chest wall: No tenderness.   Abdominal:      General: Bowel sounds are normal.      Palpations: Abdomen is soft.      Tenderness: There is no abdominal tenderness.   Musculoskeletal:         General: No tenderness.   Skin:     General: Skin is warm.      Findings: No rash.   Neurological:      General: No focal deficit present.      Mental Status: He is oriented to person, place, and time.      Motor: No weakness.      Coordination: Coordination normal.         ED Course      Procedures    The medical record was reviewed and interpreted.  Current labs reviewed and interpreted.  Current images reviewed and interpreted: Negative chest x-ray.         Results for orders placed or performed during the hospital encounter of 22   XR Chest Port 1 View     Status: None    Narrative    EXAM: XR CHEST PORT 1 VIEW  LOCATION: [facility]  DATE/TIME: 5/5/2022 11:55 PM    INDICATION: Covid 19 infection  COMPARISON: 03/27/2009      Impression    IMPRESSION: Heart size is normal. Lungs are clear of CHF, lobar consolidation, or effusion. Mediastinum is stable in appearance. Significant thoracic scoliosis again noted, apex to the right.   Lactic acid whole blood STAT     Status: Normal   Result Value Ref Range    Lactic Acid 1.1 0.7 - 2.0 mmol/L   Symptomatic; Unknown Influenza A/B & SARS-CoV2 (COVID-19) Virus PCR Multiplex Nasopharyngeal     Status: Abnormal    Specimen: Nasopharyngeal; Swab   Result Value Ref Range    Influenza A PCR Negative Negative    Influenza B PCR Negative Negative    SARS CoV2 PCR Positive (A) Negative    Narrative    Testing was performed using the daniel SARS-CoV-2 & Influenza A/B Assay on the daniel Leonora System. This test should be ordered for the detection of SARS-CoV-2 and influenza viruses in individuals who meet clinical and/or epidemiological criteria. Test performance is unknown in asymptomatic patients. This test is for in vitro diagnostic use under the FDA EUA for laboratories certified under CLIA to perform moderate and/or high complexity testing. This test has not been FDA cleared or approved. A negative result does not rule out the presence of PCR inhibitors in the specimen or target RNA in concentration below the limit of detection for the assay. If only one viral target is positive but coinfection with multiple targets is suspected, the sample should be re-tested with another FDA cleared, approved or authorized test, if coinfection would change clinical management. [facility] are certified under the Clinical Laboratory Improvement Amendments of 1988 (CLIA-88) as  qualified to perform moderate and/or high complexity laboratory testing.   [place] Draw     Status: None    Narrative    The following orders were created for panel order [place]  Draw.  Procedure                               Abnormality         Status                     ---------                               -----------         ------                     Extra Blue Top Tube[[id number]]                              Final result               Extra Red Top Tube[[id number]]                               Final result               Extra Green Top (Lithium...[[id number]]                      Final result               Extra Purple Top Tube[[id number]]                            Final result                 Please view results for these tests on the individual orders.   Extra Blue Top Tube     Status: None   Result Value Ref Range    Hold Specimen JIC    Extra Red Top Tube     Status: None   Result Value Ref Range    Hold Specimen JIC    Extra Green Top (Lithium Heparin) Tube     Status: None   Result Value Ref Range    Hold Specimen JIC    Extra Purple Top Tube     Status: None   Result Value Ref Range    Hold Specimen JIC    Comprehensive metabolic panel     Status: Abnormal   Result Value Ref Range    Sodium 139 133 - 144 mmol/L    Potassium 3.9 3.4 - 5.3 mmol/L    Chloride 106 94 - 109 mmol/L    Carbon Dioxide (CO2) 23 20 - 32 mmol/L    Anion Gap 10 3 - 14 mmol/L    Urea Nitrogen 9 7 - 30 mg/dL    Creatinine 0.72 0.66 - 1.25 mg/dL    Calcium 9.4 8.5 - 10.1 mg/dL    Glucose 107 (H) 70 - 99 mg/dL    Alkaline Phosphatase 111 40 - 150 U/L    AST 27 0 - 45 U/L    ALT 52 0 - 70 U/L    Protein Total 8.0 6.8 - 8.8 g/dL    Albumin 4.5 3.4 - 5.0 g/dL    Bilirubin Total 1.4 (H) 0.2 - 1.3 mg/dL    GFR Estimate >90 >60 mL/min/1.73m2   CBC with platelets and differential     Status: Abnormal   Result Value Ref Range    WBC Count 16.5 (H) 4.0 - 11.0 10e3/uL    RBC Count 5.17 4.40 - 5.90 10e6/uL    Hemoglobin 15.5 13.3 - 17.7 g/dL    Hematocrit 43.4 40.0 - 53.0 %    MCV 84 78 - 100 fL    MCH 30.0 26.5 - 33.0 pg    MCHC 35.7 31.5 - 36.5 g/dL    RDW 11.9 10.0 - 15.0 %    Platelet Count 287 150 - 450 10e3/uL    %  Neutrophils 87 %    % Lymphocytes 5 %    % Monocytes 8 %    % Eosinophils 0 %    % Basophils 0 %    % Immature Granulocytes 0 %    NRBCs per 100 WBC 0 <1 /100    Absolute Neutrophils 14.3 (H) 1.6 - 8.3 10e3/uL    Absolute Lymphocytes 0.8 0.8 - 5.3 10e3/uL    Absolute Monocytes 1.2 0.0 - 1.3 10e3/uL    Absolute Eosinophils 0.0 0.0 - 0.7 10e3/uL    Absolute Basophils 0.1 0.0 - 0.2 10e3/uL    Absolute Immature Granulocytes 0.1 <=0.4 10e3/uL    Absolute NRBCs 0.0 10e3/uL   Procalcitonin     Status: Abnormal   Result Value Ref Range    Procalcitonin 0.06 (H) <0.05 ng/mL   Streptococcus A Rapid Scr w Reflx to PCR     Status: Normal    Specimen: Throat; Swab   Result Value Ref Range    Group A Strep antigen Negative Negative   Group A Streptococcus PCR Throat Swab     Status: Normal    Specimen: Throat; Swab   Result Value Ref Range    Group A strep by PCR Not Detected Not Detected    Narrative    The Xpert Xpress Strep A test, performed on the [employer], is a rapid, qualitative in vitro diagnostic test for the detection of Streptococcus pyogenes (Group A ß-hemolytic Streptococcus, Strep A) in throat swab specimens from patients with signs and symptoms of pharyngitis. The Xpert Xpress Strep A test can be used as an aid in the diagnosis of Group A Streptococcal pharyngitis. The assay is not intended to monitor treatment for Group A Streptococcus infections. The Xpert Xpress Strep A test utilizes an automated real-time polymerase chain reaction (PCR) to detect Streptococcus pyogenes DNA.   CBC with platelets differential     Status: Abnormal    Narrative    The following orders were created for panel order CBC with platelets differential.  Procedure                               Abnormality         Status                     ---------                               -----------         ------                     CBC with platelets and d...[[id number]]  Abnormal            Final result                 Please  view results for these tests on the individual orders.        Results for orders placed or performed during the hospital encounter of 05/05/22   XR Chest Port 1 View     Status: None    Narrative    EXAM: XR CHEST PORT 1 VIEW  LOCATION: [facility]  DATE/TIME: 5/5/2022 11:55 PM    INDICATION: Covid 19 infection  COMPARISON: 03/27/2009      Impression    IMPRESSION: Heart size is normal. Lungs are clear of CHF, lobar consolidation, or effusion. Mediastinum is stable in appearance. Significant thoracic scoliosis again noted, apex to the right.   Lactic acid whole blood STAT     Status: Normal   Result Value Ref Range    Lactic Acid 1.1 0.7 - 2.0 mmol/L   Symptomatic; Unknown Influenza A/B & SARS-CoV2 (COVID-19) Virus PCR Multiplex Nasopharyngeal     Status: Abnormal    Specimen: Nasopharyngeal; Swab   Result Value Ref Range    Influenza A PCR Negative Negative    Influenza B PCR Negative Negative    SARS CoV2 PCR Positive (A) Negative    Narrative    Testing was performed using the daniel SARS-CoV-2 & Influenza A/B Assay on the daniel Leonora System. This test should be ordered for the detection of SARS-CoV-2 and influenza viruses in individuals who meet clinical and/or epidemiological criteria. Test performance is unknown in asymptomatic patients. This test is for in vitro diagnostic use under the FDA EUA for laboratories certified under CLIA to perform moderate and/or high complexity testing. This test has not been FDA cleared or approved. A negative result does not rule out the presence of PCR inhibitors in the specimen or target RNA in concentration below the limit of detection for the assay. If only one viral target is positive but coinfection with multiple targets is suspected, the sample should be re-tested with another FDA cleared, approved or authorized test, if coinfection would change clinical management. [facility] are certified under the Clinical  Laboratory Improvement Amendments of 1988 (CLIA-88) as  qualified to perform moderate and/or high complexity laboratory testing.   [place] Draw     Status: None    Narrative    The following orders were created for panel order [place] Draw.  Procedure                               Abnormality         Status                     ---------                               -----------         ------                     Extra Blue Top Tube[[id number]]                              Final result               Extra Red Top Tube[[id number]]                               Final result               Extra Green Top (Lithium...[[id number]]                      Final result               Extra Purple Top Tube[[id number]]                            Final result                 Please view results for these tests on the individual orders.   Extra Blue Top Tube     Status: None   Result Value Ref Range    Hold Specimen JIC    Extra Red Top Tube     Status: None   Result Value Ref Range    Hold Specimen JIC    Extra Green Top (Lithium Heparin) Tube     Status: None   Result Value Ref Range    Hold Specimen JIC    Extra Purple Top Tube     Status: None   Result Value Ref Range    Hold Specimen JIC    Comprehensive metabolic panel     Status: Abnormal   Result Value Ref Range    Sodium 139 133 - 144 mmol/L    Potassium 3.9 3.4 - 5.3 mmol/L    Chloride 106 94 - 109 mmol/L    Carbon Dioxide (CO2) 23 20 - 32 mmol/L    Anion Gap 10 3 - 14 mmol/L    Urea Nitrogen 9 7 - 30 mg/dL    Creatinine 0.72 0.66 - 1.25 mg/dL    Calcium 9.4 8.5 - 10.1 mg/dL    Glucose 107 (H) 70 - 99 mg/dL    Alkaline Phosphatase 111 40 - 150 U/L    AST 27 0 - 45 U/L    ALT 52 0 - 70 U/L    Protein Total 8.0 6.8 - 8.8 g/dL    Albumin 4.5 3.4 - 5.0 g/dL    Bilirubin Total 1.4 (H) 0.2 - 1.3 mg/dL    GFR Estimate >90 >60 mL/min/1.73m2   CBC with platelets and differential     Status: Abnormal   Result Value Ref Range    WBC Count 16.5 (H) 4.0 - 11.0 10e3/uL    RBC Count 5.17  4.40 - 5.90 10e6/uL    Hemoglobin 15.5 13.3 - 17.7 g/dL    Hematocrit 43.4 40.0 - 53.0 %    MCV 84 78 - 100 fL    MCH 30.0 26.5 - 33.0 pg    MCHC 35.7 31.5 - 36.5 g/dL    RDW 11.9 10.0 - 15.0 %    Platelet Count 287 150 - 450 10e3/uL    % Neutrophils 87 %    % Lymphocytes 5 %    % Monocytes 8 %    % Eosinophils 0 %    % Basophils 0 %    % Immature Granulocytes 0 %    NRBCs per 100 WBC 0 <1 /100    Absolute Neutrophils 14.3 (H) 1.6 - 8.3 10e3/uL    Absolute Lymphocytes 0.8 0.8 - 5.3 10e3/uL    Absolute Monocytes 1.2 0.0 - 1.3 10e3/uL    Absolute Eosinophils 0.0 0.0 - 0.7 10e3/uL    Absolute Basophils 0.1 0.0 - 0.2 10e3/uL    Absolute Immature Granulocytes 0.1 <=0.4 10e3/uL    Absolute NRBCs 0.0 10e3/uL   Procalcitonin     Status: Abnormal   Result Value Ref Range    Procalcitonin 0.06 (H) <0.05 ng/mL   Streptococcus A Rapid Scr w Reflx to PCR     Status: Normal    Specimen: Throat; Swab   Result Value Ref Range    Group A Strep antigen Negative Negative   Group A Streptococcus PCR Throat Swab     Status: Normal    Specimen: Throat; Swab   Result Value Ref Range    Group A strep by PCR Not Detected Not Detected    Narrative    The Xpert Xpress Strep A test, performed on the [employer], is a rapid, qualitative in vitro diagnostic test for the detection of Streptococcus pyogenes (Group A ß-hemolytic Streptococcus, Strep A) in throat swab specimens from patients with signs and symptoms of pharyngitis. The Xpert Xpress Strep A test can be used as an aid in the diagnosis of Group A Streptococcal pharyngitis. The assay is not intended to monitor treatment for Group A Streptococcus infections. The Xpert Xpress Strep A test utilizes an automated real-time polymerase chain reaction (PCR) to detect Streptococcus pyogenes DNA.   CBC with platelets differential     Status: Abnormal    Narrative    The following orders were created for panel order CBC with platelets differential.  Procedure                                Abnormality         Status                     ---------                               -----------         ------                     CBC with platelets and d...[[id number]]  Abnormal            Final result                 Please view results for these tests on the individual orders.     Medications   acetaminophen (TYLENOL) tablet 1,000 mg (1,000 mg Oral Given 5/5/22 4109)   0.9% sodium chloride BOLUS (0 mLs Intravenous Stopped 5/6/22 0045)        Assessments & Plan (with Medical Decision Making)       I have reviewed the nursing notes. I have reviewed the findings, diagnosis, plan and need for follow up with the patient.    Patient with COVID-19 infection at this time did not qualify for antiviral medications per [place] scoring tool.  Patient will be given an O2 sat monitor is going to be monitoring his oxygen levels closely and return if any increased symptoms occur.  Chest x-ray is negative procalcitonin is borderline and at this time patient was not started on antibiotics he understands the importance of close follow-up and if increasing shortness of breath or increasing fevers will return to the emergency room.    Final diagnoses:   Infection due to 2019 novel coronavirus       --    [facility] EMERGENCY DEPARTMENT  5/5/2022     Too Nielsen MD  05/09/22 8782

## 2022-10-09 ENCOUNTER — HEALTH MAINTENANCE LETTER (OUTPATIENT)
Age: 42
End: 2022-10-09

## 2022-11-28 DIAGNOSIS — F41.1 GENERALIZED ANXIETY DISORDER: Primary | ICD-10-CM

## 2022-11-28 DIAGNOSIS — F90.9 ADHD (ATTENTION DEFICIT HYPERACTIVITY DISORDER): ICD-10-CM

## 2022-11-28 NOTE — TELEPHONE ENCOUNTER
Patients mom calling to request medications be filled (pt used to be seen by Dr. Flores but hasn't been in office since 11/4/2020). Medications have been being managed by patient's psychiatrist ([facility]) but he was recently released from her care d/t provider retiring.      Patient just scheduled appointment 12/28/22 with Dr. Flores.      Mother indicated that the psychiatrist had sent something to Dr. Flores allowing him to now prescribe these medications?     Medications t'd up and sent to provider for approval if appropriate.     Maya Oneal RN  [facility]

## 2022-11-29 RX ORDER — CITALOPRAM HYDROBROMIDE 10 MG/1
10 TABLET ORAL DAILY
Qty: 90 TABLET | Refills: 3 | Status: SHIPPED | OUTPATIENT
Start: 2022-11-29 | End: 2023-12-04

## 2022-11-29 RX ORDER — DEXTROAMPHETAMINE SACCHARATE, AMPHETAMINE ASPARTATE MONOHYDRATE, DEXTROAMPHETAMINE SULFATE AND AMPHETAMINE SULFATE 1.25; 1.25; 1.25; 1.25 MG/1; MG/1; MG/1; MG/1
CAPSULE, EXTENDED RELEASE ORAL
COMMUNITY
Start: 2022-09-28 | End: 2022-12-28

## 2022-11-29 RX ORDER — GUANFACINE 1 MG/1
1 TABLET ORAL AT BEDTIME
Qty: 90 TABLET | Refills: 3 | Status: SHIPPED | OUTPATIENT
Start: 2022-11-29 | End: 2022-12-28

## 2022-11-29 RX ORDER — DEXTROAMPHETAMINE SACCHARATE, AMPHETAMINE ASPARTATE, DEXTROAMPHETAMINE SULFATE AND AMPHETAMINE SULFATE 1.25; 1.25; 1.25; 1.25 MG/1; MG/1; MG/1; MG/1
5 TABLET ORAL 2 TIMES DAILY PRN
Qty: 60 TABLET | Refills: 0 | Status: SHIPPED | OUTPATIENT
Start: 2022-11-29 | End: 2022-12-28 | Stop reason: ALTCHOICE

## 2022-11-30 NOTE — TELEPHONE ENCOUNTER
Mom calling to check status of pt prescription - rx sent to pharmacy yesterday    Chloe Sanchez RN  [facility]

## 2022-12-28 ENCOUNTER — OFFICE VISIT (OUTPATIENT)
Dept: FAMILY MEDICINE | Facility: CLINIC | Age: 42
End: 2022-12-28
Payer: COMMERCIAL

## 2022-12-28 VITALS
DIASTOLIC BLOOD PRESSURE: 82 MMHG | RESPIRATION RATE: 12 BRPM | OXYGEN SATURATION: 98 % | HEART RATE: 64 BPM | SYSTOLIC BLOOD PRESSURE: 124 MMHG | WEIGHT: 176 LBS | TEMPERATURE: 97.9 F | BODY MASS INDEX: 26.07 KG/M2 | HEIGHT: 69 IN

## 2022-12-28 DIAGNOSIS — F41.1 GENERALIZED ANXIETY DISORDER: ICD-10-CM

## 2022-12-28 DIAGNOSIS — F90.0 ATTENTION DEFICIT HYPERACTIVITY DISORDER (ADHD), PREDOMINANTLY INATTENTIVE TYPE: ICD-10-CM

## 2022-12-28 DIAGNOSIS — Z00.01 ENCOUNTER FOR PREVENTATIVE ADULT HEALTH CARE EXAM WITH ABNORMAL FINDINGS: Primary | ICD-10-CM

## 2022-12-28 LAB
ALBUMIN SERPL BCG-MCNC: 4.9 G/DL (ref 3.5–5.2)
ALP SERPL-CCNC: 98 U/L (ref 40–129)
ALT SERPL W P-5'-P-CCNC: 35 U/L (ref 10–50)
ANION GAP SERPL CALCULATED.3IONS-SCNC: 12 MMOL/L (ref 7–15)
AST SERPL W P-5'-P-CCNC: 33 U/L (ref 10–50)
BILIRUB SERPL-MCNC: 1 MG/DL
BUN SERPL-MCNC: 7.8 MG/DL (ref 6–20)
CALCIUM SERPL-MCNC: 9.9 MG/DL (ref 8.6–10)
CHLORIDE SERPL-SCNC: 104 MMOL/L (ref 98–107)
CHOLEST SERPL-MCNC: 183 MG/DL
CREAT SERPL-MCNC: 0.71 MG/DL (ref 0.67–1.17)
DEPRECATED HCO3 PLAS-SCNC: 25 MMOL/L (ref 22–29)
GFR SERPL CREATININE-BSD FRML MDRD: >90 ML/MIN/1.73M2
GLUCOSE SERPL-MCNC: 108 MG/DL (ref 70–99)
HDLC SERPL-MCNC: 36 MG/DL
LDLC SERPL CALC-MCNC: 99 MG/DL
NONHDLC SERPL-MCNC: 147 MG/DL
POTASSIUM SERPL-SCNC: 4.7 MMOL/L (ref 3.4–5.3)
PROT SERPL-MCNC: 7.3 G/DL (ref 6.4–8.3)
SODIUM SERPL-SCNC: 141 MMOL/L (ref 136–145)
TRIGL SERPL-MCNC: 239 MG/DL

## 2022-12-28 PROCEDURE — G0439 PPPS, SUBSEQ VISIT: HCPCS | Performed by: FAMILY MEDICINE

## 2022-12-28 PROCEDURE — 36415 COLL VENOUS BLD VENIPUNCTURE: CPT | Performed by: FAMILY MEDICINE

## 2022-12-28 PROCEDURE — 99213 OFFICE O/P EST LOW 20 MIN: CPT | Mod: 25 | Performed by: FAMILY MEDICINE

## 2022-12-28 PROCEDURE — 80053 COMPREHEN METABOLIC PANEL: CPT | Performed by: FAMILY MEDICINE

## 2022-12-28 PROCEDURE — 80061 LIPID PANEL: CPT | Performed by: FAMILY MEDICINE

## 2022-12-28 RX ORDER — DEXTROAMPHETAMINE SACCHARATE, AMPHETAMINE ASPARTATE MONOHYDRATE, DEXTROAMPHETAMINE SULFATE AND AMPHETAMINE SULFATE 1.25; 1.25; 1.25; 1.25 MG/1; MG/1; MG/1; MG/1
5 CAPSULE, EXTENDED RELEASE ORAL 2 TIMES DAILY
Qty: 180 CAPSULE | Refills: 0 | Status: SHIPPED | OUTPATIENT
Start: 2022-12-28 | End: 2023-03-08

## 2022-12-28 RX ORDER — GUANFACINE 1 MG/1
1 TABLET ORAL DAILY
Qty: 90 TABLET | Refills: 3 | Status: SHIPPED | OUTPATIENT
Start: 2022-12-28 | End: 2024-03-27

## 2022-12-28 ASSESSMENT — ENCOUNTER SYMPTOMS
EYE PAIN: 0
JOINT SWELLING: 0
NERVOUS/ANXIOUS: 0
HEARTBURN: 1
CHILLS: 0
NAUSEA: 0
WEAKNESS: 0
HEMATOCHEZIA: 0
COUGH: 0
PALPITATIONS: 0
HEMATURIA: 0
DIZZINESS: 0
ARTHRALGIAS: 0
PARESTHESIAS: 0
SHORTNESS OF BREATH: 0
FEVER: 0
HEADACHES: 0
DYSURIA: 0
FREQUENCY: 0
SORE THROAT: 0
MYALGIAS: 0
DIARRHEA: 0
CONSTIPATION: 0
ABDOMINAL PAIN: 0

## 2022-12-28 ASSESSMENT — ACTIVITIES OF DAILY LIVING (ADL)
CURRENT_FUNCTION: TRANSPORTATION REQUIRES ASSISTANCE
CURRENT_FUNCTION: MONEY MANAGEMENT REQUIRES ASSISTANCE

## 2022-12-28 NOTE — PROGRESS NOTES
"SUBJECTIVE:   Mehran is a 42 year old who presents for Preventive Visit.    Patient has been advised of split billing requirements and indicates understanding: Yes  Are you in the first 12 months of your Medicare coverage?  No    Healthy Habits:     In general, how would you rate your overall health?  Fair    Frequency of exercise:  2-3 days/week    Duration of exercise:  15-30 minutes    Do you usually eat at least 4 servings of fruit and vegetables a day, include whole grains    & fiber and avoid regularly eating high fat or \"junk\" foods?  No    Taking medications regularly:  Yes    Medication side effects:  None    Ability to successfully perform activities of daily living:  Transportation requires assistance and money management requires assistance    Home Safety:  Lack of grab bars in the bathroom    Hearing Impairment:  No hearing concerns    In the past 6 months, have you been bothered by leaking of urine?  No    In general, how would you rate your overall mental or emotional health?  Fair      PHQ-2 Total Score: 0    Additional concerns today:  Yes      Have you ever done Advance Care Planning? (For example, a Health Directive, POLST, or a discussion with a medical provider or your loved ones about your wishes): No, advance care planning information given to patient to review.  Patient plans to discuss their wishes with loved ones or provider.         Fall risk     click delete button to remove this line now  Cognitive Screening   1) Repeat 3 items (Leader, Season, Table)    2) Clock draw: UNABLE TO PREFORM  3) 3 item recall: Recalls NO objects   Results: unable due to longstanding deficits    Mini-CogTM Copyright PRAMOD Smith. Licensed by the author for use in [facility]; reprinted with permission (sherley@.[facility]). All rights reserved.      Do you have sleep apnea, excessive snoring or daytime drowsiness?: no    Reviewed and updated as needed this visit by clinical staff   Tobacco  Allergies  Meds  " Problems             Reviewed and updated as needed this visit by Provider      Problems            Social History     Tobacco Use     Smoking status: Former     Packs/day: 0.02     Years: 10.00     Pack years: 0.20     Types: Cigarettes, Cigars     Quit date: 2013     Years since quittin.8     Smokeless tobacco: Never   Substance Use Topics     Alcohol use: No     If you drink alcohol do you typically have >3 drinks per day or >7 drinks per week? Not applicable    Alcohol Use 2022   Prescreen: >3 drinks/day or >7 drinks/week? Not Applicable   Prescreen: >3 drinks/day or >7 drinks/week? -   No flowsheet data found.        Anxiety Follow-Up    How are you doing with your anxiety since your last visit? No change    Are you having other symptoms that might be associated with anxiety? No    Have you had a significant life event? No     Are you feeling depressed? No    Do you have any concerns with your use of alcohol or other drugs? No    Social History     Tobacco Use     Smoking status: Former     Packs/day: 0.02     Years: 10.00     Pack years: 0.20     Types: Cigarettes, Cigars     Quit date: 2013     Years since quittin.8     Smokeless tobacco: Never   Vaping Use     Vaping Use: Never used   Substance Use Topics     Alcohol use: No     Drug use: No     ADHD FOLLOWUP     Taking medication/s regularly  With good efficacy, and without side effects   Without drug or alcohol problems   U-tox not needed  Would like to continue current plan    Current providers sharing in care for this patient include:   Patient Care Team:  Arpan Flores MD as PCP - General (Family Practice)  Arpan Flores MD as Assigned PCP    The following health maintenance items are reviewed in Epic and correct as of today:  Health Maintenance   Topic Date Due     HEPATITIS B IMMUNIZATION (1 of 3 - 3-dose series) Never done     HEPATITIS C SCREENING  2023 (Originally 1998)     HIV SCREENING   "12/29/2023 (Originally 2/14/1995)     DTAP/TDAP/TD IMMUNIZATION (8 - Td or Tdap) 11/11/2023     MEDICARE ANNUAL WELLNESS VISIT  12/28/2023     ANNUAL REVIEW OF HM ORDERS  12/28/2023     LIPID  12/28/2027     ADVANCE CARE PLANNING  12/29/2027     PHQ-2 (once per calendar year)  Completed     INFLUENZA VACCINE  Completed     COVID-19 Vaccine  Completed     Pneumococcal Vaccine: Pediatrics (0 to 5 Years) and At-Risk Patients (6 to 64 Years)  Aged Out     IPV IMMUNIZATION  Aged Out     MENINGITIS IMMUNIZATION  Aged Out     Lab work is in process  Labs reviewed in EPIC    Review of Systems   Constitutional: Negative for chills and fever.   HENT: Negative for congestion, ear pain, hearing loss and sore throat.    Eyes: Negative for pain and visual disturbance.   Respiratory: Negative for cough and shortness of breath.    Cardiovascular: Negative for chest pain, palpitations and peripheral edema.   Gastrointestinal: Positive for heartburn. Negative for abdominal pain, constipation, diarrhea, hematochezia and nausea.   Genitourinary: Negative for dysuria, frequency, genital sores, hematuria, impotence, penile discharge and urgency.   Musculoskeletal: Negative for arthralgias, joint swelling and myalgias.   Skin: Negative for rash.   Neurological: Negative for dizziness, weakness, headaches and paresthesias.   Psychiatric/Behavioral: Negative for mood changes. The patient is not nervous/anxious.      OBJECTIVE:   /82   Pulse 64   Temp 97.9  F (36.6  C) (Temporal)   Resp 12   Ht 1.745 m (5' 8.7\")   Wt 79.8 kg (176 lb)   SpO2 98%   BMI 26.22 kg/m   Estimated body mass index is 26.22 kg/m  as calculated from the following:    Height as of this encounter: 1.745 m (5' 8.7\").    Weight as of this encounter: 79.8 kg (176 lb).  Physical Exam  GENERAL: healthy, alert and no distress  EYES: Eyes grossly normal to inspection, PERRL and conjunctivae and sclerae normal  HENT: ear canals and TM's normal, nose and mouth " "without ulcers or lesions  NECK: no adenopathy, no asymmetry, masses, or scars and thyroid normal to palpation  RESP: lungs clear to auscultation - no rales, rhonchi or wheezes  CV: regular rate and rhythm, normal S1 S2, no S3 or S4, no murmur, click or rub, no peripheral edema and peripheral pulses strong  ABDOMEN: soft, nontender, no hepatosplenomegaly, no masses and bowel sounds normal   (male): normal male genitalia without lesions or urethral discharge, no hernia  RECTAL: deferred  MS: no gross musculoskeletal defects noted, no edema  SKIN: no suspicious lesions or rashes  NEURO: Normal strength and tone, mentation intact and speech normal  PSYCH: mentation appears normal, affect normal/bright    Diagnostic Test Results:  Labs reviewed in Epic    ASSESSMENT / PLAN:       ICD-10-CM    1. Encounter for preventative adult health care exam with abnormal findings  Z00.01 Comprehensive metabolic panel (BMP + Alb, Alk Phos, ALT, AST, Total. Bili, TP)     Lipid panel reflex to direct LDL Fasting     Comprehensive metabolic panel (BMP + Alb, Alk Phos, ALT, AST, Total. Bili, TP)     Lipid panel reflex to direct LDL Fasting      2. Attention deficit hyperactivity disorder (ADHD), predominantly inattentive type  F90.0 guanFACINE (TENEX) 1 MG tablet     amphetamine-dextroamphetamine (ADDERALL XR) 5 MG 24 hr capsule      3. Generalized anxiety disorder  F41.1           COUNSELING:  Reviewed preventive health counseling, as reflected in patient instructions       Regular exercise       Healthy diet/nutrition       Vision screening       Colon cancer screening       Prostate cancer screening      BMI:   Estimated body mass index is 26.22 kg/m  as calculated from the following:    Height as of this encounter: 1.745 m (5' 8.7\").    Weight as of this encounter: 79.8 kg (176 lb).         He reports that he quit smoking about 9 years ago. His smoking use included cigarettes and cigars. He has a 0.20 pack-year smoking history. He " has never used smokeless tobacco.      Appropriate preventive services were discussed with this patient, including applicable screening as appropriate for cardiovascular disease, diabetes, osteopenia/osteoporosis, and glaucoma.  As appropriate for age/gender, discussed screening for colorectal cancer, prostate cancer, breast cancer, and cervical cancer. Checklist reviewing preventive services available has been given to the patient.    Reviewed patients plan of care and provided an AVS. The Basic Care Plan (routine screening as documented in Health Maintenance) for Mehran meets the Care Plan requirement. This Care Plan has been established and reviewed with the Patient and mother.    Patient Instructions   Contact if 90 day supply not allowed for Adderall and will change to three 1 month virtual Rxs    Arpan Flores MD  [facility]

## 2023-01-25 ENCOUNTER — TELEPHONE (OUTPATIENT)
Dept: FAMILY MEDICINE | Facility: CLINIC | Age: 43
End: 2023-01-25
Payer: COMMERCIAL

## 2023-03-08 DIAGNOSIS — F90.0 ATTENTION DEFICIT HYPERACTIVITY DISORDER (ADHD), PREDOMINANTLY INATTENTIVE TYPE: ICD-10-CM

## 2023-03-08 RX ORDER — DEXTROAMPHETAMINE SACCHARATE, AMPHETAMINE ASPARTATE MONOHYDRATE, DEXTROAMPHETAMINE SULFATE AND AMPHETAMINE SULFATE 1.25; 1.25; 1.25; 1.25 MG/1; MG/1; MG/1; MG/1
5 CAPSULE, EXTENDED RELEASE ORAL 2 TIMES DAILY
Qty: 180 CAPSULE | Refills: 0 | Status: SHIPPED | OUTPATIENT
Start: 2023-03-08 | End: 2023-03-17

## 2023-03-17 DIAGNOSIS — F90.0 ATTENTION DEFICIT HYPERACTIVITY DISORDER (ADHD), PREDOMINANTLY INATTENTIVE TYPE: ICD-10-CM

## 2023-03-17 RX ORDER — DEXTROAMPHETAMINE SACCHARATE, AMPHETAMINE ASPARTATE MONOHYDRATE, DEXTROAMPHETAMINE SULFATE AND AMPHETAMINE SULFATE 1.25; 1.25; 1.25; 1.25 MG/1; MG/1; MG/1; MG/1
5 CAPSULE, EXTENDED RELEASE ORAL 2 TIMES DAILY
Qty: 30 CAPSULE | Refills: 0 | Status: SHIPPED | OUTPATIENT
Start: 2023-03-17 | End: 2023-05-15

## 2023-03-17 NOTE — TELEPHONE ENCOUNTER
Mom calling stating that a new prescription needs to be sent. Current pharmacy does not have any. [facility] has 30 tabs right now and is hoping to get them.    Order has been Td with the correct pharmacy. Thanks    Rachel Terrell RN  [facility]

## 2023-04-01 PROCEDURE — 99282 EMERGENCY DEPT VISIT SF MDM: CPT | Performed by: EMERGENCY MEDICINE

## 2023-04-01 PROCEDURE — 99283 EMERGENCY DEPT VISIT LOW MDM: CPT | Performed by: EMERGENCY MEDICINE

## 2023-04-02 ENCOUNTER — HOSPITAL ENCOUNTER (EMERGENCY)
Facility: CLINIC | Age: 43
Discharge: HOME | End: 2023-04-02
Attending: EMERGENCY MEDICINE | Admitting: EMERGENCY MEDICINE
Payer: COMMERCIAL

## 2023-04-02 VITALS
TEMPERATURE: 98.1 F | OXYGEN SATURATION: 96 % | DIASTOLIC BLOOD PRESSURE: 96 MMHG | HEART RATE: 80 BPM | RESPIRATION RATE: 16 BRPM | SYSTOLIC BLOOD PRESSURE: 149 MMHG

## 2023-04-02 DIAGNOSIS — M26.629 TMJ PAIN DYSFUNCTION SYNDROME: ICD-10-CM

## 2023-04-02 NOTE — DISCHARGE INSTRUCTIONS
Please make an appointment to follow up with your dentist or primary care provider to discuss your symptoms     You can start treating your pain with Tylenol, ibuprofen, ice and/or heat    Return to the emergency department if you develop worsening symptoms, fevers or if you have any further concerns.    If Mehran has discomfort from fever or other pain, he can have:  Acetaminophen (Tylenol) every 6 hours as needed. His dose is:    2 regular strength tabs (650 mg)                                     (43.2+ kg/96+ lb)    NOTE: If your acetaminophen (Tylenol) came with a dropper marked with 0.4 and 0.8 ml, call us ([phone]) or check with your doctor about the dose before using it.     AND/OR      Ibuprofen (Advil, Motrin) every 6 hours as needed. His/her dose is:    2 regular strength tabs (400 mg)                                                                         (40-60 kg/ lb)

## 2023-04-02 NOTE — ED PROVIDER NOTES
[facility] EMERGENCY DEPARTMENT ([facility])    4/01/23         History     Chief Complaint   Patient presents with     Otalgia     HPI  Mehran Ibarra is a 43 year old male who with a past history of global developmental delay, hypertrophic cardiomyopathy, GERD, IBS, KARIN and ADD presents to the ED with poss ear infection.  States the pain is just in front of the right ear.  Hurts when he eats or chews.  No vertigo, dizziness, loss of hearing.  No changes to the skin or swelling noted.  Denies dental pain or throat pain.  No trouble swallowing or eating.  No numbness or tingling over his jaw.    Past Medical History  Past Medical History:   Diagnosis Date     CARDIOVASCULAR SCREENING; LDL GOAL LESS THAN 160 6/9/2012     Elevated blood pressure reading without diagnosis of hypertension 8/21/2015     Generalized anxiety disorder 6/8/2012     Diagnosis updated by automated process. Provider to review and confirm.     GERD (gastroesophageal reflux disease) 3/8/2011     Global developmental delay      IBS (irritable bowel syndrome) 3/8/2011    With constipation      Left ventricular hypertrophy 10/1/2017    Echo 9/17, referral Dr Lopez annual followup, and prn. MR in future.     Pectus excavatum 6/8/2012     Scolioses      Undiagnosed cardiac murmurs 9/11/2017     Past Surgical History:   Procedure Laterality Date     HERNIA REPAIR, INGUINAL RT/LT      Hernia Repair, RT/LT     ingrown toenail       THORACOSCOPIC REPAIR PECTUS EXCAVATUM CHILD ([place])       tooth removal       amphetamine-dextroamphetamine (ADDERALL XR) 5 MG 24 hr capsule  citalopram (CELEXA) 10 MG tablet  guanFACINE (TENEX) 1 MG tablet      No Known Allergies  Family History  Family History   Problem Relation Age of Onset     Hypertension Father      Cardiovascular Mother      Heart Disease Maternal Grandmother      Diabetes Paternal Grandfather      Social History   Social History     Tobacco Use     Smoking status: Former     Packs/day: 0.02      Years: 10.00     Pack years: 0.20     Types: Cigarettes, Cigars     Quit date: 2/28/2013     Years since quitting: 10.0     Smokeless tobacco: Never   Vaping Use     Vaping Use: Never used   Substance Use Topics     Alcohol use: No     Drug use: No      Past medical history, past surgical history, medications, allergies, family history, and social history were reviewed with the patient. No additional pertinent items.      A complete review of systems was performed with pertinent positives and negatives noted in the HPI, and all other systems negative.    Physical Exam      Physical Exam  Physical Exam   Constitutional: oriented to person, place, and time. appears well-developed and well-nourished.   HENT:   Head: Normocephalic and atraumatic. TM's normal bilaterally.  No locking or popping of the jaw.  Range of motion of the jaws normal.  Mild tenderness over the TMJ joint on the right side.  No erythema or swelling over this joint.  No swelling to the gumline or evidence of intraoral infection.  Neck: Normal range of motion.   Pulmonary/Chest: Effort normal. No respiratory distress.   Cardiac: No murmurs, rubs, gallops. RRR.  Abdominal: Abdomen soft, nontender, nondistended. No rebound tenderness.  MSK: Long bones without deformity or evidence of trauma  Neurological: alert and oriented to person, place, and time.   Skin: Skin is warm and dry.   Psychiatric:  normal mood and affect.  behavior is normal. Thought content normal.       ED Course, Procedures, & Data      Procedures         No results found for any visits on 04/01/23.  Medications - No data to display  Labs Ordered and Resulted from Time of ED Arrival to Time of ED Departure - No data to display  No orders to display          Critical care was not performed.     Medical Decision Making  The patient's presentation was of low complexity (an acute and uncomplicated illness or injury).    The patient's evaluation involved:  history and exam without other  [facility] data elements    The patient's management necessitated only low risk treatment.      Assessment & Plan    [facility]   patient presenting with right-sided jaw pain.  Seems like he may have a mild TMJ pain syndrome.  Discussed conservative treatment at this point and he actually has a follow-up with his dentist in 2 days.  No evidence of infection of the ear, jaw or mouth at this point.  He otherwise appears quite well nontoxic.  Patient and guardian agree with this plan and they will return if they have any further concerns.    I have reviewed the nursing notes. I have reviewed the findings, diagnosis, plan and need for follow up with the patient.    New Prescriptions    No medications on file       Final diagnoses:   TMJ pain dysfunction syndrome       Salvador Fernandez MD    [facility] EMERGENCY DEPARTMENT  4/1/2023     Salvador Fernandez MD  04/02/23 0041

## 2023-04-10 DIAGNOSIS — F90.0 ATTENTION DEFICIT HYPERACTIVITY DISORDER (ADHD), PREDOMINANTLY INATTENTIVE TYPE: ICD-10-CM

## 2023-04-10 RX ORDER — DEXTROAMPHETAMINE SACCHARATE, AMPHETAMINE ASPARTATE MONOHYDRATE, DEXTROAMPHETAMINE SULFATE AND AMPHETAMINE SULFATE 1.25; 1.25; 1.25; 1.25 MG/1; MG/1; MG/1; MG/1
5 CAPSULE, EXTENDED RELEASE ORAL 2 TIMES DAILY
Qty: 30 CAPSULE | Refills: 0 | Status: CANCELLED | OUTPATIENT
Start: 2023-04-10

## 2023-04-13 RX ORDER — DEXTROAMPHETAMINE SACCHARATE, AMPHETAMINE ASPARTATE MONOHYDRATE, DEXTROAMPHETAMINE SULFATE AND AMPHETAMINE SULFATE 1.25; 1.25; 1.25; 1.25 MG/1; MG/1; MG/1; MG/1
5 CAPSULE, EXTENDED RELEASE ORAL DAILY
Qty: 30 CAPSULE | Refills: 0 | Status: SHIPPED | OUTPATIENT
Start: 2023-04-13 | End: 2023-06-28

## 2023-04-14 NOTE — TELEPHONE ENCOUNTER
Mom requesting refill of adderall - pt has 4 left     Order T'd     Chloe KOVACS RN  [facility]    
Mother calling wondering if rx got sent over as she hadn't heard anything from her pharmacy, relayed to her that rx was sent and she should call the pharmacy to confirm pickup time.    Ryan Calderon RN   [facility]    
Please contact patient - is he taking Adderall 5 mg IR or XR? And once or twice daily? If patient not sure, ask to check with mom.   Thanks Arpan   
Requested Prescriptions   Pending Prescriptions Disp Refills     amphetamine-dextroamphetamine (ADDERALL XR) 5 MG 24 hr capsule 30 capsule 0     Sig: Take 1 capsule (5 mg) by mouth 2 times daily       There is no refill protocol information for this order        Chloe KOVACS RN  [facility]    
Talked with the mother and he is taking the 5mg XR and is taking it once a day.    Order has been Td up if you would like to sign.    Rachel Terrell RN  [facility]   
Doctor's office

## 2023-05-15 DIAGNOSIS — F90.0 ATTENTION DEFICIT HYPERACTIVITY DISORDER (ADHD), PREDOMINANTLY INATTENTIVE TYPE: ICD-10-CM

## 2023-05-15 NOTE — TELEPHONE ENCOUNTER
Routing refill request to provider for review/approval because:  Drug not on the [facility] refill protocol     Pt uses 1xday with option for 2nd dose as needed, which he uses 1-2x a week    Cher Saldivar RN   [facility]

## 2023-05-16 RX ORDER — DEXTROAMPHETAMINE SACCHARATE, AMPHETAMINE ASPARTATE MONOHYDRATE, DEXTROAMPHETAMINE SULFATE AND AMPHETAMINE SULFATE 1.25; 1.25; 1.25; 1.25 MG/1; MG/1; MG/1; MG/1
5 CAPSULE, EXTENDED RELEASE ORAL 2 TIMES DAILY
Qty: 60 CAPSULE | Refills: 0 | Status: SHIPPED | OUTPATIENT
Start: 2023-05-16 | End: 2023-10-23

## 2023-06-28 ENCOUNTER — OFFICE VISIT (OUTPATIENT)
Dept: FAMILY MEDICINE | Facility: CLINIC | Age: 43
End: 2023-06-28
Payer: COMMERCIAL

## 2023-06-28 VITALS
TEMPERATURE: 97 F | WEIGHT: 163.5 LBS | OXYGEN SATURATION: 99 % | HEART RATE: 65 BPM | BODY MASS INDEX: 25.66 KG/M2 | DIASTOLIC BLOOD PRESSURE: 87 MMHG | SYSTOLIC BLOOD PRESSURE: 141 MMHG | RESPIRATION RATE: 17 BRPM | HEIGHT: 67 IN

## 2023-06-28 DIAGNOSIS — F41.1 GENERALIZED ANXIETY DISORDER: ICD-10-CM

## 2023-06-28 DIAGNOSIS — R03.0 ELEVATED BLOOD PRESSURE READING WITHOUT DIAGNOSIS OF HYPERTENSION: ICD-10-CM

## 2023-06-28 DIAGNOSIS — F90.0 ATTENTION DEFICIT HYPERACTIVITY DISORDER (ADHD), PREDOMINANTLY INATTENTIVE TYPE: ICD-10-CM

## 2023-06-28 DIAGNOSIS — Z00.00 ENCOUNTER FOR PREVENTATIVE ADULT HEALTH CARE EXAMINATION: Primary | ICD-10-CM

## 2023-06-28 DIAGNOSIS — I42.2 HYPERTROPHIC CARDIOMYOPATHY (H): ICD-10-CM

## 2023-06-28 PROCEDURE — 99396 PREV VISIT EST AGE 40-64: CPT | Performed by: FAMILY MEDICINE

## 2023-06-28 RX ORDER — DEXTROAMPHETAMINE SACCHARATE, AMPHETAMINE ASPARTATE MONOHYDRATE, DEXTROAMPHETAMINE SULFATE AND AMPHETAMINE SULFATE 1.25; 1.25; 1.25; 1.25 MG/1; MG/1; MG/1; MG/1
5 CAPSULE, EXTENDED RELEASE ORAL 2 TIMES DAILY
Qty: 60 CAPSULE | Refills: 0 | Status: SHIPPED | OUTPATIENT
Start: 2023-06-28 | End: 2023-12-15

## 2023-06-28 ASSESSMENT — PAIN SCALES - GENERAL: PAINLEVEL: NO PAIN (0)

## 2023-06-28 NOTE — PATIENT INSTRUCTIONS
The 10-year ASCVD risk score (Lubna ALONSO, et al., 2019) is: 2.5%    Values used to calculate the score:      Age: 43 years      Sex: Male      Is Non- : No      Diabetic: No      Tobacco smoker: No      Systolic Blood Pressure: 141 mmHg      Is BP treated: No      HDL Cholesterol: 36 mg/dL      Total Cholesterol: 183 mg/dL

## 2023-06-28 NOTE — PROGRESS NOTES
"Assessment & Plan     Encounter for preventative adult health care examination  Doing well     Elevated blood pressure reading without diagnosis of hypertension  Not hypertensive    Attention deficit hyperactivity disorder (ADHD), predominantly inattentive type  At goal   - amphetamine-dextroamphetamine (ADDERALL XR) 5 MG 24 hr capsule; Take 1 capsule (5 mg) by mouth 2 times daily    Generalized anxiety disorder  At goal     Hypertrophic cardiomyopathy (H)  Asymptomatic     Review of external notes as documented elsewhere in note  Ordering of each unique test  Prescription drug management  30 minutes spent by me on the date of the encounter doing chart review, history and exam, documentation and further activities per the note     BMI:   Estimated body mass index is 25.27 kg/m  as calculated from the following:    Height as of this encounter: 1.713 m (5' 7.44\").    Weight as of this encounter: 74.2 kg (163 lb 8 oz).       Patient Instructions   The 10-year ASCVD risk score (Lubna ALONSO, et al., 2019) is: 2.5%    Values used to calculate the score:      Age: 43 years      Sex: Male      Is Non- : No      Diabetic: No      Tobacco smoker: No      Systolic Blood Pressure: 141 mmHg      Is BP treated: No      HDL Cholesterol: 36 mg/dL      Total Cholesterol: 183 mg/dL        Arpan Flores MD  [facility] LINCOLN Laurent is a 43 year old, presenting for the following health issues:  RECHECK and Recheck Medication        6/28/2023     9:34 AM   Additional Questions   Roomed by Adolph Martinez   Accompanied by Kay - Mom/guardian     History of Present Illness       Reason for visit:  Annual checkup    He eats 2-3 servings of fruits and vegetables daily.He consumes 2 sweetened beverage(s) daily.He exercises with enough effort to increase his heart rate 20 to 29 minutes per day.  He exercises with enough effort to increase his heart rate 5 days per week.   He is " "taking medications regularly.       Anxiety Follow-Up    How are you doing with your anxiety since your last visit? No change    Are you having other symptoms that might be associated with anxiety? Yes:  developmental delay, impulse control, ADD    Have you had a significant life event? No     Are you feeling depressed? No    Do you have any concerns with your use of alcohol or other drugs? No    Social History     Tobacco Use     Smoking status: Former     Packs/day: 0.02     Years: 10.00     Pack years: 0.20     Types: Cigarettes, Cigars     Quit date: 2/28/2013     Years since quitting: 10.3     Smokeless tobacco: Never   Vaping Use     Vaping Use: Never used   Substance Use Topics     Alcohol use: No     Drug use: No          No data to display                   No data to display              Medication Followup of Adderall XR 5 mg once or twice daily    Taking Medication as prescribed: yes    Side Effects:  None    Medication Helping Symptoms:  yes    Review of Systems   Constitutional, HEENT, cardiovascular, pulmonary, GI, , musculoskeletal, neuro, skin, endocrine and psych systems are negative, except as otherwise noted.      Objective    BP (!) 141/87 (BP Location: Left arm, Patient Position: Sitting, Cuff Size: Adult Regular)   Pulse 65   Temp 97  F (36.1  C) (Temporal)   Resp 17   Ht 1.713 m (5' 7.44\")   Wt 74.2 kg (163 lb 8 oz)   SpO2 99%   BMI 25.27 kg/m    Body mass index is 25.27 kg/m .  Physical Exam   GENERAL: healthy, alert and no distress  EYES: Eyes grossly normal to inspection, PERRL and conjunctivae and sclerae normal  HENT: ear canals and TM's normal, nose and mouth without ulcers or lesions  NECK: no adenopathy, no asymmetry, masses, or scars and thyroid normal to palpation  RESP: lungs clear to auscultation - no rales, rhonchi or wheezes  CV: regular rate and rhythm, normal S1 S2, no S3 or S4, no murmur, click or rub, no peripheral edema and peripheral pulses strong  ABDOMEN: soft, " nontender, no hepatosplenomegaly, no masses and bowel sounds normal   (male): normal male genitalia without lesions or urethral discharge, no hernia  MS: no gross musculoskeletal defects noted, no edema  SKIN: no suspicious lesions or rashes  NEURO: Normal strength and tone, mentation intact and speech normal  PSYCH: mentation appears normal, affect normal/bright

## 2023-10-23 DIAGNOSIS — F90.0 ATTENTION DEFICIT HYPERACTIVITY DISORDER (ADHD), PREDOMINANTLY INATTENTIVE TYPE: ICD-10-CM

## 2023-10-24 RX ORDER — DEXTROAMPHETAMINE SACCHARATE, AMPHETAMINE ASPARTATE MONOHYDRATE, DEXTROAMPHETAMINE SULFATE AND AMPHETAMINE SULFATE 1.25; 1.25; 1.25; 1.25 MG/1; MG/1; MG/1; MG/1
5 CAPSULE, EXTENDED RELEASE ORAL 2 TIMES DAILY
Qty: 60 CAPSULE | Refills: 0 | Status: SHIPPED | OUTPATIENT
Start: 2023-10-24

## 2023-10-27 ENCOUNTER — TELEPHONE (OUTPATIENT)
Dept: FAMILY MEDICINE | Facility: CLINIC | Age: 43
End: 2023-10-27
Payer: COMMERCIAL

## 2023-10-27 NOTE — TELEPHONE ENCOUNTER
Patients parent dropped off forms for Dr. Flores to complete.    Requesting for patient to keep his live in aid.    Form placed in BEAU'S box    To be mailed- envelope attached

## 2023-11-22 ENCOUNTER — TELEPHONE (OUTPATIENT)
Dept: FAMILY MEDICINE | Facility: CLINIC | Age: 43
End: 2023-11-22

## 2023-11-22 ENCOUNTER — OFFICE VISIT (OUTPATIENT)
Dept: FAMILY MEDICINE | Facility: CLINIC | Age: 43
End: 2023-11-22
Payer: COMMERCIAL

## 2023-11-22 VITALS
HEIGHT: 67 IN | OXYGEN SATURATION: 97 % | RESPIRATION RATE: 16 BRPM | BODY MASS INDEX: 26.21 KG/M2 | DIASTOLIC BLOOD PRESSURE: 88 MMHG | TEMPERATURE: 97.8 F | HEART RATE: 64 BPM | SYSTOLIC BLOOD PRESSURE: 134 MMHG | WEIGHT: 167 LBS

## 2023-11-22 DIAGNOSIS — H61.23 CERUMINOSIS, BILATERAL: Primary | ICD-10-CM

## 2023-11-22 DIAGNOSIS — R73.9 HYPERGLYCEMIA: ICD-10-CM

## 2023-11-22 LAB
FASTING STATUS PATIENT QL REPORTED: NO
GLUCOSE SERPL-MCNC: 97 MG/DL (ref 70–99)
HBA1C MFR BLD: 5.4 % (ref 0–5.6)

## 2023-11-22 PROCEDURE — 83036 HEMOGLOBIN GLYCOSYLATED A1C: CPT | Performed by: FAMILY MEDICINE

## 2023-11-22 PROCEDURE — 82947 ASSAY GLUCOSE BLOOD QUANT: CPT | Performed by: FAMILY MEDICINE

## 2023-11-22 PROCEDURE — 99214 OFFICE O/P EST MOD 30 MIN: CPT | Performed by: FAMILY MEDICINE

## 2023-11-22 PROCEDURE — 36415 COLL VENOUS BLD VENIPUNCTURE: CPT | Performed by: FAMILY MEDICINE

## 2023-11-22 ASSESSMENT — PAIN SCALES - GENERAL: PAINLEVEL: NO PAIN (0)

## 2023-11-22 NOTE — PROGRESS NOTES
Assessment & Plan     Foot numb    Ceruminosis, bilateral  better    Hyperglycemia  Pre-diabetes?  - Glucose; Future  - Hemoglobin A1c; Future  - Glucose  - Hemoglobin A1c    Review of external notes as documented elsewhere in note  Ordering of each unique test  25 minutes spent by me on the date of the encounter doing chart review, history and exam, documentation and further activities per the note     Patient Instructions   TETANUS Shot at pharmacy    Weekly home ear canal/wax lavage    Arpan Flores MD  [facility] 43 [facility], presenting for the following health issues:  Follow Up        11/22/2023    10:05 AM   Additional Questions   Roomed by HERMILA MEEHAN   Accompanied by MOM       History of Present Illness       Reason for visit:  Check up annual    He eats 0-1 servings of fruits and vegetables daily.He consumes 1 sweetened beverage(s) daily.He exercises with enough effort to increase his heart rate 20 to 29 minutes per day.  He exercises with enough effort to increase his heart rate 3 or less days per week.   He is taking medications regularly.       Needs followup ceruminosis, lavaged at  several weeks ago     Diabetes Follow-up    How often are you checking your blood sugar? Not at all  What concerns do you have today about your diabetes? None   Do you have any of these symptoms? (Select all that apply)  No numbness or tingling in feet.  No redness, sores or blisters on feet.  No complaints of excessive thirst.  No reports of blurry vision.  No significant changes to weight.      BP Readings from Last 2 Encounters:   11/22/23 134/88   06/28/23 (!) 141/87     Hemoglobin A1C (%)   Date Value   11/22/2023 5.4     LDL Cholesterol Calculated (mg/dL)   Date Value   12/28/2022 99   09/12/2018 96   07/26/2016 92       Review of Systems   Constitutional, HEENT, cardiovascular, pulmonary, gi and gu systems are negative, except as otherwise noted.      Objective  "   /88 (BP Location: Left arm, Patient Position: Sitting, Cuff Size: Adult Regular)   Pulse 64   Temp 97.8  F (36.6  C) (Temporal)   Resp 16   Ht 1.702 m (5' 7\")   Wt 75.8 kg (167 lb)   SpO2 97%   BMI 26.16 kg/m    Body mass index is 26.16 kg/m .  Physical Exam   GENERAL: healthy, alert and no distress  RESP: lungs clear to auscultation - no rales, rhonchi or wheezes  CV: regular rate and rhythm, normal S1 S2, no S3 or S4, no murmur, click or rub, no peripheral edema and peripheral pulses strong  MS: pectus excavatum  NEURO: sensory foot exam grossly normal, mentation intact, and mild speech impediment  PSYCH: anxious and judgement and insight impaired              "

## 2023-11-22 NOTE — TELEPHONE ENCOUNTER
How Severe Are Your Spot(S)?: moderate
Mom calling to verify that pt had an appointment today. Mom was told that appointment is at 10 am.    Rachel Terrell RN  [facility]   
What Is The Reason For Today's Visit?: Full Body Skin Examination

## 2023-11-23 NOTE — RESULT ENCOUNTER NOTE
Jakob Laurent, your recent results are back and are all normal. Please contact if any questions.   Arpan Flores MD

## 2023-12-03 DIAGNOSIS — F41.1 GENERALIZED ANXIETY DISORDER: ICD-10-CM

## 2023-12-04 RX ORDER — CITALOPRAM HYDROBROMIDE 10 MG/1
10 TABLET ORAL DAILY
Qty: 90 TABLET | Refills: 3 | Status: SHIPPED | OUTPATIENT
Start: 2023-12-04

## 2023-12-14 DIAGNOSIS — F90.0 ATTENTION DEFICIT HYPERACTIVITY DISORDER (ADHD), PREDOMINANTLY INATTENTIVE TYPE: ICD-10-CM

## 2023-12-15 RX ORDER — DEXTROAMPHETAMINE SACCHARATE, AMPHETAMINE ASPARTATE MONOHYDRATE, DEXTROAMPHETAMINE SULFATE AND AMPHETAMINE SULFATE 1.25; 1.25; 1.25; 1.25 MG/1; MG/1; MG/1; MG/1
5 CAPSULE, EXTENDED RELEASE ORAL 2 TIMES DAILY
Qty: 60 CAPSULE | Refills: 0 | Status: SHIPPED | OUTPATIENT
Start: 2023-12-15 | End: 2024-02-16

## 2023-12-15 NOTE — TELEPHONE ENCOUNTER
Mom calling asking about pt's adderall and requesting it be sent over today.    Please advise,    Thanks!  Ryan Calderon RN   [facility]

## 2024-02-15 DIAGNOSIS — F90.0 ATTENTION DEFICIT HYPERACTIVITY DISORDER (ADHD), PREDOMINANTLY INATTENTIVE TYPE: ICD-10-CM

## 2024-02-16 RX ORDER — DEXTROAMPHETAMINE SACCHARATE, AMPHETAMINE ASPARTATE MONOHYDRATE, DEXTROAMPHETAMINE SULFATE AND AMPHETAMINE SULFATE 1.25; 1.25; 1.25; 1.25 MG/1; MG/1; MG/1; MG/1
5 CAPSULE, EXTENDED RELEASE ORAL 2 TIMES DAILY
Qty: 60 CAPSULE | Refills: 0 | Status: SHIPPED | OUTPATIENT
Start: 2024-02-16 | End: 2024-05-01

## 2024-03-26 DIAGNOSIS — F90.0 ATTENTION DEFICIT HYPERACTIVITY DISORDER (ADHD), PREDOMINANTLY INATTENTIVE TYPE: ICD-10-CM

## 2024-03-27 RX ORDER — GUANFACINE 1 MG/1
1 TABLET ORAL AT BEDTIME
Qty: 90 TABLET | Refills: 3 | Status: SHIPPED | OUTPATIENT
Start: 2024-03-27

## 2024-04-30 DIAGNOSIS — F90.0 ATTENTION DEFICIT HYPERACTIVITY DISORDER (ADHD), PREDOMINANTLY INATTENTIVE TYPE: ICD-10-CM

## 2024-05-01 RX ORDER — DEXTROAMPHETAMINE SACCHARATE, AMPHETAMINE ASPARTATE MONOHYDRATE, DEXTROAMPHETAMINE SULFATE AND AMPHETAMINE SULFATE 1.25; 1.25; 1.25; 1.25 MG/1; MG/1; MG/1; MG/1
5 CAPSULE, EXTENDED RELEASE ORAL 2 TIMES DAILY
Qty: 60 CAPSULE | Refills: 0 | Status: SHIPPED | OUTPATIENT
Start: 2024-05-01 | End: 2024-06-29

## 2024-06-20 ENCOUNTER — TELEPHONE (OUTPATIENT)
Dept: FAMILY MEDICINE | Facility: CLINIC | Age: 44
End: 2024-06-20
Payer: COMMERCIAL

## 2024-06-28 DIAGNOSIS — F90.0 ATTENTION DEFICIT HYPERACTIVITY DISORDER (ADHD), PREDOMINANTLY INATTENTIVE TYPE: ICD-10-CM

## 2024-06-29 RX ORDER — DEXTROAMPHETAMINE SACCHARATE, AMPHETAMINE ASPARTATE MONOHYDRATE, DEXTROAMPHETAMINE SULFATE AND AMPHETAMINE SULFATE 1.25; 1.25; 1.25; 1.25 MG/1; MG/1; MG/1; MG/1
5 CAPSULE, EXTENDED RELEASE ORAL 2 TIMES DAILY
Qty: 60 CAPSULE | Refills: 0 | Status: SHIPPED | OUTPATIENT
Start: 2024-06-29 | End: 2024-08-28

## 2024-08-19 ENCOUNTER — TELEPHONE (OUTPATIENT)
Dept: FAMILY MEDICINE | Facility: CLINIC | Age: 44
End: 2024-08-19
Payer: COMMERCIAL

## 2024-08-19 NOTE — TELEPHONE ENCOUNTER
Patient would like ER follow-up sooner than scheduled for 8/28. Please call him back for scheduling.     Adela Alva RN

## 2024-08-28 ENCOUNTER — OFFICE VISIT (OUTPATIENT)
Dept: FAMILY MEDICINE | Facility: CLINIC | Age: 44
End: 2024-08-28
Payer: COMMERCIAL

## 2024-08-28 VITALS
BODY MASS INDEX: 25.54 KG/M2 | RESPIRATION RATE: 23 BRPM | WEIGHT: 168.5 LBS | HEIGHT: 68 IN | DIASTOLIC BLOOD PRESSURE: 68 MMHG | OXYGEN SATURATION: 99 % | HEART RATE: 52 BPM | TEMPERATURE: 97 F | SYSTOLIC BLOOD PRESSURE: 102 MMHG

## 2024-08-28 DIAGNOSIS — I42.2 HYPERTROPHIC CARDIOMYOPATHY (H): ICD-10-CM

## 2024-08-28 DIAGNOSIS — I48.0 PAROXYSMAL ATRIAL FIBRILLATION (H): Primary | ICD-10-CM

## 2024-08-28 DIAGNOSIS — I34.0 NONRHEUMATIC MITRAL VALVE REGURGITATION: ICD-10-CM

## 2024-08-28 DIAGNOSIS — F90.0 ATTENTION DEFICIT HYPERACTIVITY DISORDER (ADHD), PREDOMINANTLY INATTENTIVE TYPE: ICD-10-CM

## 2024-08-28 DIAGNOSIS — Z87.01 HISTORY OF COMMUNITY ACQUIRED PNEUMONIA: ICD-10-CM

## 2024-08-28 PROCEDURE — 99214 OFFICE O/P EST MOD 30 MIN: CPT | Performed by: FAMILY MEDICINE

## 2024-08-28 RX ORDER — METOPROLOL TARTRATE 25 MG/1
25 TABLET, FILM COATED ORAL 2 TIMES DAILY
COMMUNITY
Start: 2024-08-18 | End: 2024-09-04

## 2024-08-28 RX ORDER — DEXTROAMPHETAMINE SACCHARATE, AMPHETAMINE ASPARTATE MONOHYDRATE, DEXTROAMPHETAMINE SULFATE AND AMPHETAMINE SULFATE 1.25; 1.25; 1.25; 1.25 MG/1; MG/1; MG/1; MG/1
5 CAPSULE, EXTENDED RELEASE ORAL EVERY MORNING
COMMUNITY
Start: 2024-08-28 | End: 2024-08-28

## 2024-08-28 ASSESSMENT — PAIN SCALES - GENERAL: PAINLEVEL: NO PAIN (0)

## 2024-08-28 NOTE — PROGRESS NOTES
"Assessment & Plan     Paroxysmal atrial fibrillation (H)  At goal     History of community acquired pneumonia  improved    Hypertrophic cardiomyopathy (H)  Chronic, at goal     Nonrheumatic mitral valve regurgitation  Stable, at goal     Attention deficit hyperactivity disorder (ADHD), predominantly inattentive type  At goal, doubt heart fail contamination  - amphetamine-dextroamphetamine (ADDERALL XR) 5 MG 24 hr capsule; Take 1 capsule (5 mg) by mouth every morning.        MED REC REQUIRED  Post Medication Reconciliation Status: discharge medications reconciled, continue medications without change  BMI  Estimated body mass index is 25.62 kg/m  as calculated from the following:    Height as of this encounter: 1.727 m (5' 8\").    Weight as of this encounter: 76.4 kg (168 lb 8 oz).         Patient Instructions   Please ask cardiologist if any problems with Adderall for Mr Fred Laurent is a 44 year old, presenting for the following health issues:  Hospital F/U      8/28/2024     9:22 AM   Additional Questions   Roomed by Jen MEEHAN   Accompanied by Mom     [facility]     Hospital Follow-up Visit:    Hospital/Nursing Home/ Rehab Facility:  [facility]  Date of Admission: 8/16/24  Date of Discharge: 8/18/24  Reason(s) for Admission: Afib RVR  Was the patient in the ICU or did the patient experience delirium during hospitalization?  No  Do you have any other stressors you would like to discuss with your provider? No    Problems taking medications regularly:  None  Medication changes since discharge: eliquis and metoprolol  Problems adhering to non-medication therapy:  None    Summary of hospitalization:  [facility] discharge summary reviewed  Diagnostic Tests/Treatments reviewed.  Follow up needed: yes  Other Healthcare Providers Involved in Patient s Care:         None    Update since discharge: improved.       Plan of care communicated with patient and family   Review of Systems  Constitutional, " "HEENT, cardiovascular, pulmonary, gi and gu systems are negative, except as otherwise noted.      Objective    /68 (BP Location: Right arm, Patient Position: Sitting, Cuff Size: Adult Regular)   Pulse 52   Temp 97  F (36.1  C) (Temporal)   Resp 23   Ht 1.727 m (5' 8\")   Wt 76.4 kg (168 lb 8 oz)   SpO2 99%   BMI 25.62 kg/m    Body mass index is 25.62 kg/m .  Physical Exam   GENERAL: alert and no distress  RESP: lungs clear to auscultation - no rales, rhonchi or wheezes  CV: regular rate and rhythm, normal S1 S2, no S3 or S4, no murmur, click or rub, no peripheral edema  ABDOMEN: soft, nontender, no hepatosplenomegaly, no masses and bowel sounds normal  MS: no gross musculoskeletal defects noted, no edema  NEURO: abnormal mental status - mild, dysarthria, and unsteady, uses cane  PSYCH: affect flat, anxious, and speech pressured        Signed Electronically by: Arpan Flores MD    "

## 2024-09-04 DIAGNOSIS — I48.0 PAROXYSMAL ATRIAL FIBRILLATION (H): Primary | ICD-10-CM

## 2024-09-04 DIAGNOSIS — F90.0 ATTENTION DEFICIT HYPERACTIVITY DISORDER (ADHD), PREDOMINANTLY INATTENTIVE TYPE: ICD-10-CM

## 2024-09-04 DIAGNOSIS — R03.0 ELEVATED BLOOD PRESSURE READING WITHOUT DIAGNOSIS OF HYPERTENSION: ICD-10-CM

## 2024-09-04 RX ORDER — METOPROLOL TARTRATE 25 MG/1
25 TABLET, FILM COATED ORAL 2 TIMES DAILY
Qty: 180 TABLET | Refills: 3 | Status: SHIPPED | OUTPATIENT
Start: 2024-09-04

## 2024-09-04 RX ORDER — DEXTROAMPHETAMINE SACCHARATE, AMPHETAMINE ASPARTATE MONOHYDRATE, DEXTROAMPHETAMINE SULFATE AND AMPHETAMINE SULFATE 1.25; 1.25; 1.25; 1.25 MG/1; MG/1; MG/1; MG/1
5 CAPSULE, EXTENDED RELEASE ORAL 2 TIMES DAILY
Qty: 60 CAPSULE | Refills: 0 | Status: SHIPPED | OUTPATIENT
Start: 2024-09-04

## 2024-09-04 NOTE — TELEPHONE ENCOUNTER
Pending Prescriptions:                       Disp   Refills    apixaban ANTICOAGULANT (ELIQUIS) 5 MG tab*                    Sig: Take 1 tablet (5 mg) by mouth 2 times daily.    metoprolol tartrate (LOPRESSOR) 25 MG tab*                    Sig: Take 1 tablet (25 mg) by mouth 2 times daily.

## 2024-09-04 NOTE — TELEPHONE ENCOUNTER
Medication Question or Refill        What medication are you calling about (include dose and sig)?: med request    1)  Eloquis  2)  Metopolol    Preferred Pharmacy:   [id number] - [place], [place] - [address] [place] [address]  Phone: [phone] Fax: [phone]      Controlled Substance Agreement on file:   CSA -- Patient Level:    CSA: None found at the patient level.       Who prescribed the medication?: Dr. Holm at  [facility]  had a followup with E/R last week and wonders if can get these new prescriptions from [facility] this time around prior to appt in Octo 1 with Cardiology.    Do you need a refill? Yes    When did you use the medication last? current    Patient offered an appointment? No    Do you have any questions or concerns?  No      Could we send this information to you in [facility] or would you prefer to receive a phone call?:   Patient would prefer a phone call   Okay to leave a detailed message?: No at Other phone number:  [phone] *

## 2024-09-07 RX ORDER — DEXTROAMPHETAMINE SACCHARATE, AMPHETAMINE ASPARTATE MONOHYDRATE, DEXTROAMPHETAMINE SULFATE AND AMPHETAMINE SULFATE 1.25; 1.25; 1.25; 1.25 MG/1; MG/1; MG/1; MG/1
5 CAPSULE, EXTENDED RELEASE ORAL EVERY MORNING
Qty: 60 CAPSULE | Refills: 0 | Status: SHIPPED | OUTPATIENT
Start: 2024-09-07

## 2024-09-16 ENCOUNTER — TRANSFERRED RECORDS (OUTPATIENT)
Dept: HEALTH INFORMATION MANAGEMENT | Facility: CLINIC | Age: 44
End: 2024-09-16
Payer: COMMERCIAL

## 2024-11-06 DIAGNOSIS — F90.0 ATTENTION DEFICIT HYPERACTIVITY DISORDER (ADHD), PREDOMINANTLY INATTENTIVE TYPE: ICD-10-CM

## 2024-11-06 RX ORDER — DEXTROAMPHETAMINE SACCHARATE, AMPHETAMINE ASPARTATE MONOHYDRATE, DEXTROAMPHETAMINE SULFATE AND AMPHETAMINE SULFATE 1.25; 1.25; 1.25; 1.25 MG/1; MG/1; MG/1; MG/1
CAPSULE, EXTENDED RELEASE ORAL
Qty: 60 CAPSULE | Refills: 0 | Status: SHIPPED | OUTPATIENT
Start: 2024-11-06

## 2024-11-08 ENCOUNTER — TELEPHONE (OUTPATIENT)
Dept: FAMILY MEDICINE | Facility: CLINIC | Age: 44
End: 2024-11-08
Payer: COMMERCIAL

## 2024-11-08 DIAGNOSIS — F90.0 ATTENTION DEFICIT HYPERACTIVITY DISORDER (ADHD), PREDOMINANTLY INATTENTIVE TYPE: ICD-10-CM

## 2024-11-08 NOTE — TELEPHONE ENCOUNTER
Pt's mom calling, no C2C on file, requesting refill of pt's adderall XR 5mg to [facility] in [facility]. Rx was sent in on 11/6/24. Called pharmacy and they confirmed they have the prescription but only brand name, so they would have to pay for brand otherwise wouldn't be able to fill until next week.    Thanks!  Ryan TIMMONS RN   [facility]

## 2024-11-08 NOTE — TELEPHONE ENCOUNTER
Pt had a pacemaker placed last week. Wife was wondering if she needed to bring him in to change bandage.   Instructions were given post op to call them if it changes. Instructed to take off bandage when told to and assess for s/s of infection, drainage or fowl smelling odor , that the area is well approximated. To please call if has any questions or concerns.   Thanks,  Lars Jennings RN  [facility]

## 2024-11-25 ENCOUNTER — OFFICE VISIT (OUTPATIENT)
Dept: FAMILY MEDICINE | Facility: CLINIC | Age: 44
End: 2024-11-25
Payer: COMMERCIAL

## 2024-11-25 VITALS
SYSTOLIC BLOOD PRESSURE: 132 MMHG | DIASTOLIC BLOOD PRESSURE: 68 MMHG | HEIGHT: 67 IN | TEMPERATURE: 97.9 F | RESPIRATION RATE: 18 BRPM | WEIGHT: 169.5 LBS | HEART RATE: 56 BPM | OXYGEN SATURATION: 98 % | BODY MASS INDEX: 26.6 KG/M2

## 2024-11-25 DIAGNOSIS — F90.0 ATTENTION DEFICIT HYPERACTIVITY DISORDER (ADHD), PREDOMINANTLY INATTENTIVE TYPE: ICD-10-CM

## 2024-11-25 DIAGNOSIS — I34.0 NONRHEUMATIC MITRAL VALVE REGURGITATION: ICD-10-CM

## 2024-11-25 DIAGNOSIS — I42.2 HYPERTROPHIC CARDIOMYOPATHY (H): ICD-10-CM

## 2024-11-25 DIAGNOSIS — Z95.0 S/P PLACEMENT OF CARDIAC PACEMAKER: ICD-10-CM

## 2024-11-25 DIAGNOSIS — I48.0 PAROXYSMAL ATRIAL FIBRILLATION (H): ICD-10-CM

## 2024-11-25 DIAGNOSIS — Z00.00 ENCOUNTER FOR HEALTH MAINTENANCE EXAMINATION: Primary | ICD-10-CM

## 2024-11-25 DIAGNOSIS — R42 DIZZINESS: ICD-10-CM

## 2024-11-25 PROCEDURE — 99396 PREV VISIT EST AGE 40-64: CPT | Performed by: FAMILY MEDICINE

## 2024-11-25 SDOH — HEALTH STABILITY: PHYSICAL HEALTH: ON AVERAGE, HOW MANY DAYS PER WEEK DO YOU ENGAGE IN MODERATE TO STRENUOUS EXERCISE (LIKE A BRISK WALK)?: 5 DAYS

## 2024-11-25 SDOH — HEALTH STABILITY: PHYSICAL HEALTH: ON AVERAGE, HOW MANY MINUTES DO YOU ENGAGE IN EXERCISE AT THIS LEVEL?: 60 MIN

## 2024-11-25 ASSESSMENT — PAIN SCALES - GENERAL: PAINLEVEL_OUTOF10: NO PAIN (0)

## 2024-11-25 ASSESSMENT — SOCIAL DETERMINANTS OF HEALTH (SDOH): HOW OFTEN DO YOU GET TOGETHER WITH FRIENDS OR RELATIVES?: TWICE A WEEK

## 2024-11-25 NOTE — PROGRESS NOTES
Preventive Care Visit  [facility]  Arpan Flores MD, Family Medicine  Nov 25, 2024      Assessment & Plan     Encounter for health maintenance examination  Due for encounter.    S/P placement of cardiac pacemaker  Recovering well and pacemaker appears to be working well. Bruising looks to be resolving at this time with no concern of hematoma or axillary lymphadenopathy.   - Follow up with cardiology as recommended post procedure    Dizziness  New concern likely due to volume status and possible dehydration following procedure and vomiting episodes. Symptoms have seem to resolved at this time.   - Encouraged adequate hydration   - Consider follow up with cardiology/EP to increase lower limit of pacemaker if causing consist problems with dizziness     Hypertrophic cardiomyopathy (H)  At goal. Doing well s/p pacemaker placement. Following with cardiology.     Paroxysmal atrial fibrillation (H)  At goal. No recent episodes that could contribute to the dizziness. Following with cardiology.    Nonrheumatic mitral valve regurgitation  Chronic, at goal. Following with cardiology.     Attention deficit hyperactivity disorder (ADHD), predominantly inattentive type  At goal.     Counseling  Appropriate preventive services were addressed with this patient via screening, questionnaire, or discussion as appropriate for fall prevention, nutrition, physical activity, Tobacco-use cessation, social engagement, weight loss and cognition.  Checklist reviewing preventive services available has been given to the patient.  Reviewed patient's diet, addressing concerns and/or questions.   Discussed possible causes of fatigue. Updated plan of care.  Patient reported difficulty with activities of daily living were addressed today.    Patient Instructions   Continue monitoring the bruising on left upper arm and let me know if the swelling in the armpit persists.    Drink 6-8 8 oz glasses of water a  "day to stay hydrated. Follow up if dizziness spells persist.     Mercedes Laurent is a 44 year old, presenting for the following:  Physical (AWV)        11/25/2024    10:26 AM   Additional Questions   Roomed by Jen MEEHAN   Accompanied by Sister/PCA         HPI    Dizziness  Patient endorses two days of dizzy spells last week with an episode of vomiting. He thinks it may have been related to something he ate or a \"virus\" as other people in his house were also sick and dizzy. Patient admits to not drinking as much water as usual, especially following the pacemaker procedure. He denies any headaches, vision changes, falls, loss of consciousness, nasal congestion, cough, shortness of breath, palpations, diarrhea, or abdominal pain. The dizziness has largely resolved since last week and he denies any residual symptoms.     Cardiomyopathy s/p pacemaker placement  Pacemaker placement in October. Denies any complications from the procedure but has noticed some swelling in the left armpit and left upper arm. Denies any pain with the bruising but does have tenderness in the left upper chest where the pacemaker was placed. Denies any fevers, chest pain, palpitations, shortness of breath, lower leg swelling, headaches, or vision changes. Has not had an official follow up with cardiology yet but did check in with EP one week after placement and pacemaker was shown to be working well.    Atrial Fibrillation Follow-up    Symptoms: no chest pain, no palpitations, no dyspnea, and no increased dependent edema  Stroke prevention: DOAC (Eliquis, Xarelto, Pradaxa)        4/2/2023    12:02 AM 6/28/2023     9:36 AM 11/22/2023    10:06 AM 8/28/2024     9:24 AM 11/25/2024    10:37 AM   Date   Pulse 80 65 64 52 56     Current      ADD  Well controlled with Adderall.     Anxiety   How are you doing with your anxiety since your last visit? Stable  Are you having other symptoms that might be associated with anxiety? No  Have you had a " significant life event? No   Are you feeling depressed? No  Do you have any concerns with your use of alcohol or other drugs? No    Social History     Tobacco Use    Smoking status: Former     Current packs/day: 0.00     Average packs/day: (0.2 ttl pk-yrs)     Types: Cigarettes, Cigars     Start date: 2003     Quit date: 2013     Years since quittin.7    Smokeless tobacco: Never   Vaping Use    Vaping status: Never Used   Substance Use Topics    Alcohol use: No    Drug use: No       Health Care Directive  Patient does not have a Health Care Directive: Discussed advance care planning with patient; however, patient declined at this time.      2024   General Health   How would you rate your overall physical health? Good   Feel stress (tense, anxious, or unable to sleep) Not at all            2024   Nutrition   Diet: Regular (no restrictions)            2024   Exercise   Days per week of moderate/strenous exercise 5 days   Average minutes spent exercising at this level 60 min            2024   Social Factors   Frequency of gathering with friends or relatives Twice a week   Worry food won't last until get money to buy more No   Food not last or not have enough money for food? No   Do you have housing? (Housing is defined as stable permanent housing and does not include staying ouside in a car, in a tent, in an abandoned building, in an overnight shelter, or couch-surfing.) Yes   Are you worried about losing your housing? No   Lack of transportation? No   Unable to get utilities (heat,electricity)? No            2024   Fall Risk   Fallen 2 or more times in the past year? No    Trouble with walking or balance? No        Patient-reported          2024   Activities of Daily Living- Home Safety   Needs help with the following daily activites Transportation   Safety concerns in the home None of the above            2024   Dental   Dentist two times every year? Yes             2024   Hearing Screening   Hearing concerns? None of the above            2024   Driving Risk Screening   Patient/family members have concerns about driving (!) DECLINE            2024   General Alertness/Fatigue Screening   Have you been more tired than usual lately? (!) YES            2024   Urinary Incontinence Screening   Bothered by leaking urine in past 6 months No            2024   TB Screening   Were you born outside of the [place]? No            Today's PHQ-2 Score:       2024    10:35 AM   PHQ-2 (  [employer])   Q1: Little interest or pleasure in doing things 0    Q2: Feeling down, depressed or hopeless 0    PHQ-2 Score 0    Q1: Little interest or pleasure in doing things Not at all   Q2: Feeling down, depressed or hopeless Not at all   PHQ-2 Score 0       Patient-reported           2024   Substance Use   Alcohol more than 3/day or more than 7/wk Not Applicable   Do you have a current opioid prescription? No   How severe/bad is pain from 1 to 10? 2/10   Do you use any other substances recreationally? No        Social History     Tobacco Use    Smoking status: Former     Current packs/day: 0.00     Average packs/day: (0.2 ttl pk-yrs)     Types: Cigarettes, Cigars     Start date: 2003     Quit date: 2013     Years since quittin.7    Smokeless tobacco: Never   Vaping Use    Vaping status: Never Used   Substance Use Topics    Alcohol use: No    Drug use: No             2024   One time HIV Screening   Previous HIV test? I don't know      ASCVD Risk   The 10-year ASCVD risk score (Lubna ALONSO, et al., 2019) is: 2.5%    Values used to calculate the score:      Age: 44 years      Sex: Male      Is Non- : No      Diabetic: No      Tobacco smoker: No      Systolic Blood Pressure: 132 mmHg      Is BP treated: No      HDL Cholesterol: 36 mg/dL      Total Cholesterol: 183 mg/dL        2024   Contraception/Family  Planning   Questions about contraception or family planning No            Reviewed and updated as needed this visit by Provider                    Past Medical History:   Diagnosis Date    CARDIOVASCULAR SCREENING; LDL GOAL LESS THAN 160 6/9/2012    Elevated blood pressure reading without diagnosis of hypertension 8/21/2015    Generalized anxiety disorder 6/8/2012     Diagnosis updated by automated process. Provider to review and confirm.    GERD (gastroesophageal reflux disease) 3/8/2011    Global developmental delay     IBS (irritable bowel syndrome) 3/8/2011    With constipation     Left ventricular hypertrophy 10/1/2017    Echo 9/17, referral Dr Lopez annual followup, and prn. MR in future.    Pectus excavatum 6/8/2012    Scolioses     Undiagnosed cardiac murmurs 9/11/2017     Past Surgical History:   Procedure Laterality Date    HERNIA REPAIR, INGUINAL RT/LT      Hernia Repair, RT/LT    ingrown toenail      THORACOSCOPIC REPAIR PECTUS EXCAVATUM CHILD ([place])      tooth removal       Labs reviewed in EPIC  BP Readings from Last 3 Encounters:   11/25/24 132/68   08/28/24 102/68   11/22/23 134/88    Wt Readings from Last 3 Encounters:   11/25/24 76.9 kg (169 lb 8 oz)   08/28/24 76.4 kg (168 lb 8 oz)   11/22/23 75.8 kg (167 lb)            Current Outpatient Medications   Medication Sig Dispense Refill    amphetamine-dextroamphetamine (ADDERALL XR) 5 MG 24 hr capsule TAKE 1 CAPSULE BY MOUTH TWO TIMES PER DAY 60 capsule 0    amphetamine-dextroamphetamine (ADDERALL XR) 5 MG 24 hr capsule Take 1 capsule (5 mg) by mouth every morning. 60 capsule 0    amphetamine-dextroamphetamine (ADDERALL XR) 5 MG 24 hr capsule Take 1 capsule (5 mg) by mouth 2 times daily 60 capsule 0    apixaban ANTICOAGULANT (ELIQUIS) 5 MG tablet Take 1 tablet (5 mg) by mouth 2 times daily. 180 tablet 3    citalopram (CELEXA) 10 MG tablet TAKE 1 TABLET(10 MG) BY MOUTH DAILY 90 tablet 3    guanFACINE (TENEX) 1 MG tablet TAKE 1 TABLET(1 MG) BY MOUTH AT  "BEDTIME 90 tablet 3    metoprolol tartrate (LOPRESSOR) 25 MG tablet Take 1 tablet (25 mg) by mouth 2 times daily. 180 tablet 3     Current providers sharing in care for this patient include:  Patient Care Team:  Arpan Flores MD as PCP - General (Family Practice)  Arpan Flores MD as Assigned PCP    The following health maintenance items are reviewed in Epic and correct as of today:  Health Maintenance   Topic Date Due    Pneumococcal Vaccine: Pediatrics (0 to 5 Years) and At-Risk Patients (6 to 64 Years) (1 of 2 - PCV) Never done    HIV SCREENING  Never done    HEPATITIS C SCREENING  Never done    HEPATITIS B IMMUNIZATION (1 of 3 - 19+ 3-dose series) Never done    DTAP/TDAP/TD IMMUNIZATION (7 - Td or Tdap) 11/11/2023    MEDICARE ANNUAL WELLNESS VISIT  06/28/2024    ANNUAL REVIEW OF HM ORDERS  08/28/2025    GLUCOSE  11/22/2026    LIPID  12/28/2027    ADVANCE CARE PLANNING  12/29/2027    RSV VACCINE (1 - 1-dose 75+ series) 02/14/2055    PHQ-2 (once per calendar year)  Completed    INFLUENZA VACCINE  Completed    COVID-19 Vaccine  Completed    HPV IMMUNIZATION  Aged Out    MENINGITIS IMMUNIZATION  Aged Out    RSV MONOCLONAL ANTIBODY  Aged Out     Review of Systems  Constitutional, neuro, ENT, endocrine, pulmonary, cardiac, gastrointestinal, genitourinary, musculoskeletal, integument and psychiatric systems are negative, except as otherwise noted.     Objective    Exam  /68 (BP Location: Right arm, Patient Position: Sitting, Cuff Size: Adult Regular)   Pulse 56   Temp 97.9  F (36.6  C) (Temporal)   Resp 18   Ht 1.71 m (5' 7.32\")   Wt 76.9 kg (169 lb 8 oz)   SpO2 98%   BMI 26.29 kg/m     Estimated body mass index is 26.29 kg/m  as calculated from the following:    Height as of this encounter: 1.71 m (5' 7.32\").    Weight as of this encounter: 76.9 kg (169 lb 8 oz).    Physical Exam  GENERAL: alert and no distress  EYES: Eyes grossly normal to inspection, PERRL, EOM intact, and conjunctivae " and sclerae normal  HENT: ear canals and TM's normal, nose and mouth without ulcers or lesions  NECK: no adenopathy, no asymmetry, masses, or scars  RESP: lungs clear to auscultation - no rales, rhonchi or wheezes  CV: bradycardia, normal S1 S2, no S3 or S4, no murmur, click or rub, peripheral pulses strong, no peripheral edema, and color normal  ABDOMEN: soft, nontender, no hepatosplenomegaly, no masses and bowel sounds normal  MS: no gross musculoskeletal defects noted, no edema  SKIN: no suspicious lesions or rashes, healing green and yellow ecchymosis toleft medial upper arm and extends into left armpit, no hematoma present, and no axillary lymphadenopathy, 5 steri strips with old dried drainage to left upper chest, no surrounding ecchymosis, drainage, or erythema  NEURO: Oriented x4, cranial nerves 2-12 intact, normal strength and tone, mild dysarthria, balance intact  PSYCH: mentation appears normal, affect normal/bright          11/25/2024   Mini Cog   Mini-Cog Not Completed (choose reason) Mental handicap             Note by Keyur Covarrubias, RN, DNP Student   The information in this document, created by the nurse practioner student for me, accurately reflects the services I personally performed and the decisions made by me. I have reviewed and approved this document for accuracy prior to leaving the patient care area.    Signed Electronically by: Arpan Flores MD

## 2025-01-20 ENCOUNTER — OFFICE VISIT (OUTPATIENT)
Dept: URGENT CARE | Facility: URGENT CARE | Age: 45
End: 2025-01-20
Payer: COMMERCIAL

## 2025-01-20 VITALS
DIASTOLIC BLOOD PRESSURE: 76 MMHG | BODY MASS INDEX: 25.61 KG/M2 | WEIGHT: 169 LBS | RESPIRATION RATE: 16 BRPM | SYSTOLIC BLOOD PRESSURE: 108 MMHG | HEIGHT: 68 IN | OXYGEN SATURATION: 97 % | HEART RATE: 74 BPM | TEMPERATURE: 97.7 F

## 2025-01-20 DIAGNOSIS — R05.1 ACUTE COUGH: ICD-10-CM

## 2025-01-20 DIAGNOSIS — F90.0 ATTENTION DEFICIT HYPERACTIVITY DISORDER (ADHD), PREDOMINANTLY INATTENTIVE TYPE: ICD-10-CM

## 2025-01-20 DIAGNOSIS — J10.1 INFLUENZA A: Primary | ICD-10-CM

## 2025-01-20 LAB
FLUAV AG SPEC QL IA: POSITIVE
FLUBV AG SPEC QL IA: NEGATIVE

## 2025-01-20 PROCEDURE — 87804 INFLUENZA ASSAY W/OPTIC: CPT

## 2025-01-20 PROCEDURE — 99213 OFFICE O/P EST LOW 20 MIN: CPT | Performed by: PHYSICIAN ASSISTANT

## 2025-01-20 RX ORDER — BENZONATATE 100 MG/1
CAPSULE ORAL
Qty: 45 CAPSULE | Refills: 0 | Status: SHIPPED | OUTPATIENT
Start: 2025-01-20

## 2025-01-20 RX ORDER — DEXTROAMPHETAMINE SACCHARATE, AMPHETAMINE ASPARTATE MONOHYDRATE, DEXTROAMPHETAMINE SULFATE AND AMPHETAMINE SULFATE 1.25; 1.25; 1.25; 1.25 MG/1; MG/1; MG/1; MG/1
5 CAPSULE, EXTENDED RELEASE ORAL 2 TIMES DAILY
Qty: 60 CAPSULE | Refills: 0 | Status: SHIPPED | OUTPATIENT
Start: 2025-01-20

## 2025-01-20 RX ORDER — GUAIFENESIN 600 MG/1
TABLET, EXTENDED RELEASE ORAL
Qty: 30 TABLET | Refills: 0 | Status: SHIPPED | OUTPATIENT
Start: 2025-01-20

## 2025-01-20 NOTE — PROGRESS NOTES
"Chief Complaint   Patient presents with    Urgent Care     Coughing, runny nose and fever since Saturday.        ASSESSMENT/PLAN:  Mehran was seen today for urgent care.    Diagnoses and all orders for this visit:    Influenza A  -     benzonatate (TESSALON) 100 MG capsule; Take 1-2 capsules by mouth up to 3 x a day as needed with food  -     guaiFENesin (MUCINEX) 600 MG 12 hr tablet; Take 1-2 tablets as needed up to twice a day    Acute cough  -     Influenza A/B antigen    Reassuring exam and vitals.  Influenza A positive.  Symptomatic cares and expected length of symptoms discussed at length and outlined in AVS  Return precautions also discussed    Salvador Zuñiga PA-C      SUBJECTIVE:  Mehran is a 44 year old male who presents to urgent care with 3 days of fever, cough, nasal congestion.  Thought he was improving yesterday but developed a fever again today.  No shortness of the chest pain.  Recent history of pneumonia.    ROS: Pertinent ROS neg other than the symptoms noted above in the HPI.     OBJECTIVE:  /76   Pulse 74   Temp 97.7  F (36.5  C) (Temporal)   Resp 16   Ht 1.727 m (5' 8\")   Wt 76.7 kg (169 lb)   SpO2 97%   BMI 25.70 kg/m     GENERAL: alert and no distress  EYES: Eyes grossly normal to inspection, PERRL and conjunctivae and   RESP: lungs clear to auscultation - no rales, rhonchi or wheezes  CV: regular rate and rhythm, normal S1 S2, no S3 or S4, no murmur, click or rub, no peripheral edema     DIAGNOSTICS    Results for orders placed or performed in visit on 01/20/25   Influenza A/B antigen     Status: Abnormal    Specimen: Nose; Swab   Result Value Ref Range    Influenza A antigen Positive (A) Negative    Influenza B antigen Negative Negative    Narrative    Test results must be correlated with clinical data. If necessary, results should be confirmed by a molecular assay or viral culture.        Current Outpatient Medications   Medication Sig Dispense Refill    apixaban ANTICOAGULANT " (ELIQUIS) 5 MG tablet Take 1 tablet (5 mg) by mouth 2 times daily. 180 tablet 3    citalopram (CELEXA) 10 MG tablet TAKE 1 TABLET(10 MG) BY MOUTH DAILY 90 tablet 3    guanFACINE (TENEX) 1 MG tablet TAKE 1 TABLET(1 MG) BY MOUTH AT BEDTIME 90 tablet 3    metoprolol tartrate (LOPRESSOR) 25 MG tablet Take 1 tablet (25 mg) by mouth 2 times daily. 180 tablet 3    amphetamine-dextroamphetamine (ADDERALL XR) 5 MG 24 hr capsule Take 1 capsule (5 mg) by mouth 2 times daily. 60 capsule 0    amphetamine-dextroamphetamine (ADDERALL XR) 5 MG 24 hr capsule Take 1 capsule (5 mg) by mouth every morning. 60 capsule 0    amphetamine-dextroamphetamine (ADDERALL XR) 5 MG 24 hr capsule Take 1 capsule (5 mg) by mouth 2 times daily 60 capsule 0     No current facility-administered medications for this visit.      Patient Active Problem List   Diagnosis    GERD (gastroesophageal reflux disease)    IBS (irritable bowel syndrome)    Pectus excavatum    Attention deficit disorder    Generalized anxiety disorder    CARDIOVASCULAR SCREENING; LDL GOAL LESS THAN 130    Global developmental delay    Atypical nevus of back    Bunion of great toe of left foot    Epithelial cyst    Scolioses    Impulse control disorder    Intellectual disability    Tobacco dependence in remission    Lung nodule, solitary    Hypertrophic cardiomyopathy (H)    Paroxysmal atrial fibrillation (H)    History of community acquired pneumonia    Nonrheumatic mitral valve regurgitation      Past Medical History:   Diagnosis Date    CARDIOVASCULAR SCREENING; LDL GOAL LESS THAN 160 6/9/2012    Elevated blood pressure reading without diagnosis of hypertension 8/21/2015    Generalized anxiety disorder 6/8/2012     Diagnosis updated by automated process. Provider to review and confirm.    GERD (gastroesophageal reflux disease) 3/8/2011    Global developmental delay     IBS (irritable bowel syndrome) 3/8/2011    With constipation     Left ventricular hypertrophy 10/1/2017    Echo  , referral Dr Lopez annual followup, and prn. MR in future.    Pectus excavatum 2012    Scolioses     Undiagnosed cardiac murmurs 2017     Past Surgical History:   Procedure Laterality Date    HERNIA REPAIR, INGUINAL RT/LT      Hernia Repair, RT/LT    ingrown toenail      THORACOSCOPIC REPAIR PECTUS EXCAVATUM CHILD ([facility])      tooth removal       Family History   Problem Relation Age of Onset    Hypertension Father     Cardiovascular Mother     Heart Disease Maternal Grandmother     Diabetes Paternal Grandfather      Social History     Tobacco Use    Smoking status: Former     Current packs/day: 0.00     Average packs/day: (0.2 ttl pk-yrs)     Types: Cigarettes, Cigars     Start date: 2003     Quit date: 2013     Years since quittin.9    Smokeless tobacco: Never   Substance Use Topics    Alcohol use: No              The plan of care was discussed with the patient. They understand and agree with the course of treatment prescribed. A printed summary was given including instructions and medications.  The use of Dragon/[employer] dictation services may have been used to construct the content in this note; any grammatical or spelling errors are non-intentional. Please contact the author of this note directly if you are in need of any clarification.

## 2025-01-20 NOTE — PATIENT INSTRUCTIONS
You have Influenza This commonly causes symptoms of your throat, nose, sinuses and bronchi.    You do not need to do anything besides rest and hydrate and your body will get over this.  Sometimes it can take up to 2-3 weeks to do so.  And the symptoms can be very annoying.    People are commonly contagious for about 3 to 5 days.  Wearing a mask will significantly reduce your risk of transmitting this to someone else if you are within that timeframe.    Some things that you can do to help with your symptoms include:    Pain, fever, malaise and inflammation:  Ibuprofen, If your Primary Care Provider allows, for fever, pain and inflammation  Tylenol for pain and fever   Ibuprofen 400-600 mg (2-3 of the 200 mg OTC tablets or 400-600 mg of the children's liquid) up to 4 times daily with food or milk  Tylenol 500-1000 mg every 8 hours as needed    For nasal congestion and drainage  Flonase/fluticasone nasal spray 2 sprays in each nostril once a day for 1 - 4 weeks.  This may take several days to become effective.  Consider saline nasal rinses  Saline Sprays and Saline drops in the nostrils can reduce the length of symptoms and reduce transmission to others  Vicks VapoRub  Humidified air or steam    Cough:  Mucinex or guaifenesin can help get mucus out of your body and help with cough.  Dextromethorphan is a cough suppressant that may be helpful  Tessalon Perles may be prescribed  Vicks VapoRub  Humidified air or steam  Teaspoon of honey  Warm beverages, teas    Ear fullness or pain:  Flonase as above  Ibuprofen as above  Otovent, which is a balloon you blow up with your nose and helps pop the ears and regulate the pressure can be bought off of [employer] and works quite well    Be sure to eat nutrient dense foods with a good mixture of fats, carbohydrates and proteins.  Your body burns more calories while sick.    If you develop new or worsening symptoms such as shortness of breath, consistent chest pain or symptoms fail to  improve within a week please be seen again

## 2025-02-15 ENCOUNTER — ANCILLARY PROCEDURE (OUTPATIENT)
Dept: GENERAL RADIOLOGY | Facility: CLINIC | Age: 45
End: 2025-02-15
Attending: NURSE PRACTITIONER
Payer: COMMERCIAL

## 2025-02-15 ENCOUNTER — OFFICE VISIT (OUTPATIENT)
Dept: URGENT CARE | Facility: URGENT CARE | Age: 45
End: 2025-02-15
Payer: COMMERCIAL

## 2025-02-15 VITALS
HEART RATE: 66 BPM | DIASTOLIC BLOOD PRESSURE: 84 MMHG | TEMPERATURE: 97.9 F | RESPIRATION RATE: 20 BRPM | OXYGEN SATURATION: 96 % | WEIGHT: 156 LBS | BODY MASS INDEX: 23.72 KG/M2 | SYSTOLIC BLOOD PRESSURE: 145 MMHG

## 2025-02-15 DIAGNOSIS — K59.00 CONSTIPATION, UNSPECIFIED CONSTIPATION TYPE: ICD-10-CM

## 2025-02-15 DIAGNOSIS — R10.84 ABDOMINAL PAIN, GENERALIZED: ICD-10-CM

## 2025-02-15 DIAGNOSIS — R10.84 ABDOMINAL PAIN, GENERALIZED: Primary | ICD-10-CM

## 2025-02-15 PROCEDURE — 99214 OFFICE O/P EST MOD 30 MIN: CPT

## 2025-02-15 PROCEDURE — 74019 RADEX ABDOMEN 2 VIEWS: CPT | Mod: TC | Performed by: RADIOLOGY

## 2025-02-15 NOTE — PROGRESS NOTES
"Assessment & Plan     Abdominal pain, generalized  Constipation, unspecified constipation type  Pleasant 45-year-old male presents to clinic with his mother a few day, possibly more history of lower abdominal pain vomiting, no fever or chills.  Harder stools and out.  Patient noted a very small amount of blood with wiping once, however none further.  Did try Tums without any movement.  Difficulty eating due to feeling of fullness.  Denies any urinary tract symptoms.  Abdominal x-ray obtained, independently read by this provider noting a moderate amount of stool in the colon, radiologist also agrees and final read.  Advised to do a MiraLAX cleanout directions provided.  Encouraged to increase fiber intake after and ensure plenty of fluids.  Advised if symptoms are not improving or worsening to return for reevaluation.  - XR Abdomen 2 Views; Future               BMI  Estimated body mass index is 23.72 kg/m  as calculated from the following:    Height as of 1/20/25: 1.727 m (5' 8\").    Weight as of this encounter: 70.8 kg (156 lb).   Weight management plan: Patient was referred to their PCP to discuss a diet and exercise plan.      See patient instructions    No follow-ups on file.    Angelica Bender, [facility]     Mehran Ibarra is a 45 year old male who presents today for the following health issues  accompanied by his mother      HPI  Patient presents with:  Abdominal Pain: Lower middle pain x 2 days, no injury      Feels stools are hard to come out. Not hard, but really struggling to get out.   Last bowel movement today, not really struggle today   Noted a little blood with wiping, not a lot  No fevers or chills   No nausea or vomiting   Pain has bothered him the last 2 nights  Feels like a stomach ache   No stool softeners   Tried Tums and didn't help  Difficulty eating and if he's feeling full due to the pain ~ may have been going on longer  Urinating just " fine         Review of Systems  Constitutional, HEENT, cardiovascular, pulmonary, gi and gu systems are negative, except as otherwise noted.    Objective   BP (!) 145/84 (BP Location: Right arm, Patient Position: Sitting, Cuff Size: Adult Large)   Pulse 66   Temp 97.9  F (36.6  C) (Tympanic)   Resp 20   Wt 70.8 kg (156 lb)   SpO2 96%   BMI 23.72 kg/m    Body mass index is 23.72 kg/m .      Physical Exam  GENERAL APPEARANCE: healthy, alert, and no distress  RESP: lungs clear to auscultation - no rales, rhonchi or wheezes  CV: regular rates and rhythm, normal S1 S2, no S3 or S4, and no murmur, click or rub  ABDOMEN: soft, nontender, without hepatosplenomegaly or masses and bowel sounds normal  MS: extremities normal- no gross deformities noted  SKIN: no suspicious lesions or rashes  NEURO: Normal strength and tone, mentation intact, and speech normal  PSYCH: mentation appears normal and affect normal/bright    Diagnostic Test Results:  Xray -independently read by this provider showing a moderate amount of stool in the colon.  Final radiologist read also completed as below.    EXAM: XR ABDOMEN 2 VIEWS  LOCATION: [facility]  DATE: 2/15/2025     INDICATION:  Abdominal pain, generalized, Constipation, unspecified constipation type  COMPARISON: None.                                                                      IMPRESSION: Moderate amount stool throughout the colon. Small bowel gas pattern normal. No free air. No stones seen.    Chart documentation with Dragon Voice recognition Software. Although reviewed after completion, some words and grammatical errors may remain.

## 2025-02-16 NOTE — PATIENT INSTRUCTIONS
"In the evening start with a 238 g (8.3 oz bottle) or 255 g (8.9 oz bottle [generic]) of MiraLax mixed in ~1,900 mL (64 ounces) of Gatorade  until dissolved (lemon-lime is typically used; do not use red)     Drink 240 mL (8 oz) every 10 minutes for 4 doses until 960 mL (32 oz) is consumed along with a sennakot tonight.     The following morning/afternoon if still feeling \"full\" drink 240 mL (8 oz) every 10 minutes for 4 doses until 960 mL (32 ounces) is consumed.     Then make sure from here on out you are getting good daily fiber intake and continue good fluids     Return to clinic otherwise if abdominal symptoms are not improved in the next 4-5 days  "

## 2025-02-17 ENCOUNTER — TELEPHONE (OUTPATIENT)
Dept: FAMILY MEDICINE | Facility: CLINIC | Age: 45
End: 2025-02-17
Payer: COMMERCIAL

## 2025-02-17 NOTE — TELEPHONE ENCOUNTER
Reason for Call:  Appointment Request    Patient requesting this type of appt:  Hospital/ED Follow-Up     Requested provider: Arpan Flores    Reason patient unable to be scheduled: Not within requested timeframe    When does patient want to be seen/preferred time: 3-7 days    Comments: was in urgent care on 2/15/2025 for adbomial pain and needs a follow up    Could we send this information to you in [facility] or would you prefer to receive a phone call?:   Patient would prefer a phone call   Okay to leave a detailed message?: Yes at Home number on file [phone] (home)    Call taken on 2/17/2025 at 9:11 AM by Pooja Betancur

## 2025-02-18 ENCOUNTER — TELEPHONE (OUTPATIENT)
Dept: FAMILY MEDICINE | Facility: CLINIC | Age: 45
End: 2025-02-18
Payer: COMMERCIAL

## 2025-02-18 NOTE — TELEPHONE ENCOUNTER
Forms/Letter Request    Type of form/letter: OTHER: MEDICAL CLEARANCE FOR DENTAL TREATMENT        Do we have the form/letter: Yes: DR.KELLY ADLER    Who is the form from? Patient    Where did/will the form come from? Patient or family brought in       When is form/letter needed by: NA    How would you like the form/letter returned: Mail  Is this the correct address?: Yes  [address]  [place] [address]    Patient Notified form requests are processed in 5-7 business days:Yes    Could we send this information to you in [employer] or would you prefer to receive a phone call?:   Patient would like to be contacted via [employer]

## 2025-02-24 ENCOUNTER — OFFICE VISIT (OUTPATIENT)
Dept: FAMILY MEDICINE | Facility: CLINIC | Age: 45
End: 2025-02-24
Payer: COMMERCIAL

## 2025-02-24 VITALS
HEIGHT: 68 IN | SYSTOLIC BLOOD PRESSURE: 125 MMHG | DIASTOLIC BLOOD PRESSURE: 80 MMHG | TEMPERATURE: 97.9 F | WEIGHT: 158 LBS | BODY MASS INDEX: 23.95 KG/M2 | HEART RATE: 57 BPM | OXYGEN SATURATION: 98 %

## 2025-02-24 DIAGNOSIS — R63.4 UNINTENTIONAL WEIGHT LOSS: Primary | ICD-10-CM

## 2025-02-24 DIAGNOSIS — Z11.4 SCREENING FOR HIV (HUMAN IMMUNODEFICIENCY VIRUS): ICD-10-CM

## 2025-02-24 DIAGNOSIS — R10.9 ABDOMINAL DISCOMFORT: ICD-10-CM

## 2025-02-24 DIAGNOSIS — Z12.11 SCREEN FOR COLON CANCER: ICD-10-CM

## 2025-02-24 PROBLEM — L72.0 EPITHELIAL CYST: Status: RESOLVED | Noted: 2017-09-11 | Resolved: 2025-02-24

## 2025-02-24 LAB
ERYTHROCYTE [DISTWIDTH] IN BLOOD BY AUTOMATED COUNT: 12.1 % (ref 10–15)
HCT VFR BLD AUTO: 43.7 % (ref 40–53)
HGB BLD-MCNC: 15.2 G/DL (ref 13.3–17.7)
MCH RBC QN AUTO: 29.5 PG (ref 26.5–33)
MCHC RBC AUTO-ENTMCNC: 34.8 G/DL (ref 31.5–36.5)
MCV RBC AUTO: 85 FL (ref 78–100)
PLATELET # BLD AUTO: 305 10E3/UL (ref 150–450)
RBC # BLD AUTO: 5.15 10E6/UL (ref 4.4–5.9)
WBC # BLD AUTO: 10 10E3/UL (ref 4–11)

## 2025-02-24 PROCEDURE — 83690 ASSAY OF LIPASE: CPT | Performed by: FAMILY MEDICINE

## 2025-02-24 PROCEDURE — 87389 HIV-1 AG W/HIV-1&-2 AB AG IA: CPT | Performed by: FAMILY MEDICINE

## 2025-02-24 PROCEDURE — 36415 COLL VENOUS BLD VENIPUNCTURE: CPT | Performed by: FAMILY MEDICINE

## 2025-02-24 PROCEDURE — 86481 TB AG RESPONSE T-CELL SUSP: CPT | Performed by: FAMILY MEDICINE

## 2025-02-24 PROCEDURE — 80053 COMPREHEN METABOLIC PANEL: CPT | Performed by: FAMILY MEDICINE

## 2025-02-24 PROCEDURE — 82150 ASSAY OF AMYLASE: CPT | Performed by: FAMILY MEDICINE

## 2025-02-24 PROCEDURE — G2211 COMPLEX E/M VISIT ADD ON: HCPCS | Performed by: FAMILY MEDICINE

## 2025-02-24 PROCEDURE — 85027 COMPLETE CBC AUTOMATED: CPT | Performed by: FAMILY MEDICINE

## 2025-02-24 PROCEDURE — 99214 OFFICE O/P EST MOD 30 MIN: CPT | Performed by: FAMILY MEDICINE

## 2025-02-24 PROCEDURE — 84443 ASSAY THYROID STIM HORMONE: CPT | Performed by: FAMILY MEDICINE

## 2025-02-24 ASSESSMENT — PAIN SCALES - GENERAL: PAINLEVEL_OUTOF10: NO PAIN (0)

## 2025-02-24 NOTE — PATIENT INSTRUCTIONS
1) Get tetanus and pneumonia shots at pharmacy when able.   2) Please complete blood work and schedule screening colonoscopy.  3) Schedule followup 6-8 weeks.

## 2025-02-24 NOTE — PROGRESS NOTES
"Assessment & Plan     Unintentional weight loss  Abdominal discomfort  Significant weight loss of 14lbs since January 20th to today in the context of intermittent abdominal pain after recent stool washout. Will begin workup today with bloodwork with plan to follow up in 6-8 weeks. Patient encouraged to maintain regular, soft stools with fiber and miralax.   - CBC with platelets; Future  - Quantiferon TB Gold Plus; Future  - Lipase; Future  - Amylase; Future  - TSH with free T4 reflex; Future  - Comprehensive metabolic panel (BMP + Alb, Alk Phos, ALT, AST, Total. Bili, TP); Future    Screening for HIV (human immunodeficiency virus)  Will complete once-lifetime screening in the context of recent weight loss.  - HIV Antigen Antibody Combo    Screen for colon cancer  Routine screening  - Colonoscopy Screening  Referral; Future    MED REC REQUIRED  Post Medication Reconciliation Status:    see orders      Subjective   Mehran is a 45 year old, presenting for the following health issues:  Urgent Care F/U      2/24/2025     8:26 AM   Additional Questions   Roomed by Adolph MARTELL   Accompanied by Kay Kowalski (Mother)     HPI     ED/UC Followup:    Facility:  [facility]   Date of visit: 02/15/2025  Reason for visit: Abdominal pain, generalized  Constipation, unspecified constipation type  Current Status: \"doing okay\" pt's mother reports that he has been having some pain, but has been taking Tums to help. Pt reports still having pain every day, but the pain isn't as bad as it was when he went to urgent care    Mehran is a pleasant 46yo with PMH IBS, intellectual delay who presents today with concern for ongoing abdominal discomfort and marked weight loss over the past month (down to 155 lbs from 169 lbs in January).     Patient was recently seen one week ago at urgent care where he presented for evaluation of constant RLQ abdominal pain. Significant stool burden was noted on xray, and " "patient completed washout with \"a half a bottle of miralax and gatorade.\" After stooling, patient has felt significantly better and pain has improved. However, he continues to have intermittent pain with eating, and has been taking tums 3x/day on the advice of his mother. He is unable to specify the exact frequency or chronology of his current pain. He denies blood in his stool or acute changes in the texture of his stool. Reports that since the washout he has had loose stools every other day or so.    Today he denies significant changes to his diet, but his mother is concerned that he hasn't been eating as much. He typically eats one large meal for lunch and has frozen food for dinner. He was seen at the dentist six months ago; denies dental pain. He had some fevers one month ago, when he was diagnosed with influenza A at urgent care. He has gone to a group support facility 3x/week for the past six years; at its largest this included exposure to up to 450 people, many of whom live at group homes. He denies ever having sexual intercourse or injecting drugs.    The patient denies chest pain, chest pressure, shortness of breath, fevers/chills.     Review of Systems  Constitutional, HEENT, cardiovascular, pulmonary, gi and gu systems are negative, except as otherwise noted.      Objective    /80 (BP Location: Left arm, Patient Position: Sitting, Cuff Size: Adult Regular)   Pulse 57   Temp 97.9  F (36.6  C) (Temporal)   Ht 1.715 m (5' 7.52\")   Wt 71.7 kg (158 lb)   SpO2 98%   BMI 24.37 kg/m    Body mass index is 24.37 kg/m .  Physical Exam  Constitutional:       General: He is not in acute distress.     Appearance: Normal appearance. He is normal weight. He is not ill-appearing or toxic-appearing.   HENT:      Mouth/Throat:      Mouth: Mucous membranes are moist.      Pharynx: Oropharynx is clear. No oropharyngeal exudate or posterior oropharyngeal erythema.   Eyes:      General: No scleral icterus.        " Right eye: No discharge.         Left eye: No discharge.      Extraocular Movements: Extraocular movements intact.      Pupils: Pupils are equal, round, and reactive to light.   Cardiovascular:      Rate and Rhythm: Normal rate and regular rhythm.      Pulses: Normal pulses.      Heart sounds: Murmur heard.      Crescendo systolic murmur is present with a grade of 2/6.   Pulmonary:      Effort: Pulmonary effort is normal. No respiratory distress.      Breath sounds: Normal breath sounds. No stridor. No wheezing, rhonchi or rales.   Abdominal:      General: There is distension.      Palpations: There is no mass.      Tenderness: There is no abdominal tenderness. There is no guarding.      Hernia: No hernia is present.   Musculoskeletal:         General: Normal range of motion.      Cervical back: Normal range of motion and neck supple. No rigidity or tenderness.   Lymphadenopathy:      Cervical: No cervical adenopathy.   Skin:     General: Skin is warm and dry.      Capillary Refill: Capillary refill takes less than 2 seconds.   Neurological:      General: No focal deficit present.      Mental Status: He is alert and oriented to person, place, and time. Mental status is at baseline.   Psychiatric:         Mood and Affect: Mood normal.         Behavior: Behavior normal.         Thought Content: Thought content normal.         Judgment: Judgment normal.                IVANA gO, [employer] [facility] [employer] [facility] [employer]  The information in this document, created by the nurse practioner student for me, accurately reflects the services I personally performed and the decisions made by me. I have reviewed and approved this document for accuracy prior to leaving the patient care area.    Signed Electronically by: Arpan Flores MD

## 2025-02-25 DIAGNOSIS — F90.0 ATTENTION DEFICIT HYPERACTIVITY DISORDER (ADHD), PREDOMINANTLY INATTENTIVE TYPE: ICD-10-CM

## 2025-02-25 LAB
ALBUMIN SERPL BCG-MCNC: 4.7 G/DL (ref 3.5–5.2)
ALP SERPL-CCNC: 77 U/L (ref 40–150)
ALT SERPL W P-5'-P-CCNC: 23 U/L (ref 0–70)
AMYLASE SERPL-CCNC: 38 U/L (ref 28–100)
ANION GAP SERPL CALCULATED.3IONS-SCNC: 10 MMOL/L (ref 7–15)
AST SERPL W P-5'-P-CCNC: 28 U/L (ref 0–45)
BILIRUB SERPL-MCNC: 1.5 MG/DL
BUN SERPL-MCNC: 14.1 MG/DL (ref 6–20)
CALCIUM SERPL-MCNC: 9.7 MG/DL (ref 8.8–10.4)
CHLORIDE SERPL-SCNC: 107 MMOL/L (ref 98–107)
CREAT SERPL-MCNC: 0.73 MG/DL (ref 0.67–1.17)
EGFRCR SERPLBLD CKD-EPI 2021: >90 ML/MIN/1.73M2
GLUCOSE SERPL-MCNC: 94 MG/DL (ref 70–99)
HCO3 SERPL-SCNC: 25 MMOL/L (ref 22–29)
HIV 1+2 AB+HIV1 P24 AG SERPL QL IA: NONREACTIVE
LIPASE SERPL-CCNC: 31 U/L (ref 13–60)
POTASSIUM SERPL-SCNC: 4.4 MMOL/L (ref 3.4–5.3)
PROT SERPL-MCNC: 6.9 G/DL (ref 6.4–8.3)
QUANTIFERON MITOGEN: 10 IU/ML
QUANTIFERON NIL TUBE: 0.04 IU/ML
QUANTIFERON TB1 TUBE: 0.03 IU/ML
QUANTIFERON TB2 TUBE: 0.03
SODIUM SERPL-SCNC: 142 MMOL/L (ref 135–145)
TSH SERPL DL<=0.005 MIU/L-ACNC: 1.23 UIU/ML (ref 0.3–4.2)

## 2025-02-26 LAB
GAMMA INTERFERON BACKGROUND BLD IA-ACNC: 0.04 IU/ML
M TB IFN-G BLD-IMP: NEGATIVE
M TB IFN-G CD4+ BCKGRND COR BLD-ACNC: 9.96 IU/ML
MITOGEN IGNF BCKGRD COR BLD-ACNC: -0.01 IU/ML
MITOGEN IGNF BCKGRD COR BLD-ACNC: -0.01 IU/ML

## 2025-02-26 RX ORDER — GUANFACINE 1 MG/1
1 TABLET ORAL AT BEDTIME
Qty: 90 TABLET | Refills: 3 | Status: SHIPPED | OUTPATIENT
Start: 2025-02-26

## 2025-02-27 NOTE — RESULT ENCOUNTER NOTE
Jakob Villanueva: Mehran's recent results are within normal limits, with slightly elevated bili not worrisome. TB normal.  Contact if questions    Arpan Flores MD

## 2025-03-03 ENCOUNTER — TELEPHONE (OUTPATIENT)
Dept: GASTROENTEROLOGY | Facility: CLINIC | Age: 45
End: 2025-03-03

## 2025-03-03 ENCOUNTER — ANCILLARY PROCEDURE (OUTPATIENT)
Dept: GENERAL RADIOLOGY | Facility: CLINIC | Age: 45
End: 2025-03-03
Attending: PHYSICIAN ASSISTANT
Payer: COMMERCIAL

## 2025-03-03 ENCOUNTER — OFFICE VISIT (OUTPATIENT)
Dept: URGENT CARE | Facility: URGENT CARE | Age: 45
End: 2025-03-03
Payer: COMMERCIAL

## 2025-03-03 VITALS
SYSTOLIC BLOOD PRESSURE: 122 MMHG | HEART RATE: 63 BPM | BODY MASS INDEX: 23.44 KG/M2 | DIASTOLIC BLOOD PRESSURE: 78 MMHG | TEMPERATURE: 97 F | OXYGEN SATURATION: 97 % | WEIGHT: 152 LBS | RESPIRATION RATE: 16 BRPM

## 2025-03-03 DIAGNOSIS — M54.6 ACUTE LEFT-SIDED THORACIC BACK PAIN: Primary | ICD-10-CM

## 2025-03-03 DIAGNOSIS — M54.6 ACUTE LEFT-SIDED THORACIC BACK PAIN: ICD-10-CM

## 2025-03-03 LAB
AMYLASE SERPL-CCNC: 49 U/L (ref 28–100)
ERYTHROCYTE [DISTWIDTH] IN BLOOD BY AUTOMATED COUNT: 12.3 % (ref 10–15)
HCT VFR BLD AUTO: 48.5 % (ref 40–53)
HGB BLD-MCNC: 16.2 G/DL (ref 13.3–17.7)
LIPASE SERPL-CCNC: 37 U/L (ref 13–60)
MCH RBC QN AUTO: 28.7 PG (ref 26.5–33)
MCHC RBC AUTO-ENTMCNC: 33.4 G/DL (ref 31.5–36.5)
MCV RBC AUTO: 86 FL (ref 78–100)
PLATELET # BLD AUTO: 385 10E3/UL (ref 150–450)
RBC # BLD AUTO: 5.64 10E6/UL (ref 4.4–5.9)
WBC # BLD AUTO: 9.8 10E3/UL (ref 4–11)

## 2025-03-03 PROCEDURE — 93000 ELECTROCARDIOGRAM COMPLETE: CPT | Performed by: PHYSICIAN ASSISTANT

## 2025-03-03 PROCEDURE — 3074F SYST BP LT 130 MM HG: CPT | Performed by: PHYSICIAN ASSISTANT

## 2025-03-03 PROCEDURE — 82150 ASSAY OF AMYLASE: CPT | Performed by: PHYSICIAN ASSISTANT

## 2025-03-03 PROCEDURE — 83690 ASSAY OF LIPASE: CPT | Performed by: PHYSICIAN ASSISTANT

## 2025-03-03 PROCEDURE — 71101 X-RAY EXAM UNILAT RIBS/CHEST: CPT | Mod: TC | Performed by: INTERNAL MEDICINE

## 2025-03-03 PROCEDURE — 36415 COLL VENOUS BLD VENIPUNCTURE: CPT | Performed by: PHYSICIAN ASSISTANT

## 2025-03-03 PROCEDURE — 99214 OFFICE O/P EST MOD 30 MIN: CPT | Performed by: PHYSICIAN ASSISTANT

## 2025-03-03 PROCEDURE — 3078F DIAST BP <80 MM HG: CPT | Performed by: PHYSICIAN ASSISTANT

## 2025-03-03 PROCEDURE — 85027 COMPLETE CBC AUTOMATED: CPT | Performed by: PHYSICIAN ASSISTANT

## 2025-03-03 PROCEDURE — 1125F AMNT PAIN NOTED PAIN PRSNT: CPT | Performed by: PHYSICIAN ASSISTANT

## 2025-03-03 ASSESSMENT — PAIN SCALES - GENERAL: PAINLEVEL_OUTOF10: MILD PAIN (3)

## 2025-03-03 NOTE — TELEPHONE ENCOUNTER
Pre Assessment RN Review    Focused Assessments    Patient had ICD placed 11/2024 - no follow up with cards can be found. Patient should follow up with cards prior to having procedure and get clearance including if they can hold blood thinner.       Scheduling Status & Recommendations    Sedation: MAC/Deep Sedation - Per RN assessment. Patient has intellectual disability  Location Type: Hospital - Per exclusion criteria.

## 2025-03-03 NOTE — TELEPHONE ENCOUNTER
"Endoscopy Scheduling Screen    Have you had any respiratory illness or flu-like symptoms in the last 10 days?  No    What is your communication preference for Instructions and/or Bowel Prep?   MyChart    What insurance is in the chart?  Other:  [facility]    Ordering/Referring Provider:     MANASA YANEZ      (If ordering provider performs procedure, schedule with ordering provider unless otherwise instructed. )    BMI: Estimated body mass index is 23.44 kg/m  as calculated from the following:    Height as of 2/24/25: 1.715 m (5' 7.52\").    Weight as of an earlier encounter on 3/3/25: 68.9 kg (152 lb).     Sedation Ordered  moderate sedation.   If patient BMI > 50 do not schedule in [facility].    If patient BMI > 45 do not schedule at [facility].    Are you taking methadone or Suboxone?  NO, No RN review required.    Have you been diagnosed and are being treated for severe PTSD or severe anxiety?  NO, No RN review required.    Are you taking any prescription medications for pain 3 or more times per week?   NO, No RN review required.    Do you have a history of malignant hyperthermia?  No    (Females) Are you currently pregnant?        Have you been diagnosed or told you have pulmonary hypertension?   No    Do you have an LVAD?  No    Have you been told you have moderate to severe sleep apnea?  No.    Have you been told you have COPD, asthma, or any other lung disease?  No    Has your doctor ordered any cardiac tests like echo, angiogram, stress test, ablation, or EKG, that you have not completed yet?  No    Do you  have a history of any heart conditions?  Yes     Have you had any hospitalizations  in the last year for heart related issues, for example a stent placement, heart attack, or cardiomyopathy?  Yes (RN Review required for scheduling.)    Do you have any implantable devices in your body (pacemaker, ICD)?  Pacemaker (schedule colonoscopy in Hospital Only) and ICD (schedule colonoscopy in Hospital Only)    Do you take " "nitroglycerine?  No    Have you ever had or are you waiting for an organ transplant?  No. Continue scheduling, no site restrictions.    Have you had a stroke or transient ischemic attack (TIA aka \"mini stroke\") in the last 2 years?   No.    Have you been diagnosed with or been told you have cirrhosis of the liver?   No.    Are you currently on dialysis?   No    Do you need assistance transferring?   No    BMI: Estimated body mass index is 23.44 kg/m  as calculated from the following:    Height as of 2/24/25: 1.715 m (5' 7.52\").    Weight as of an earlier encounter on 3/3/25: 68.9 kg (152 lb).     Is patients BMI > 40 and scheduling location [facility]?  No    Do you take an injectable or oral medication for weight loss or diabetes (excluding insulin)?  No    Do you take the medication Naltrexone?  No    Do you take blood thinners?  Yes, you must contact your prescribing provider for directions on holding or bridging with a different medication.       Prep   Are you currently on dialysis or do you have chronic kidney disease?  No    Do you have a diagnosis of diabetes?  No    Do you have a diagnosis of cystic fibrosis (CF)?  No    On a regular basis do you go 3 -5 days between bowel movements?  No    BMI > 40?  No    Preferred Pharmacy:    [id number] - [place], [place] - [address] AT [facility] [address]  [address]  [place] [address]  Phone: [phone] Fax: [phone]      Final Scheduling Details     Procedure scheduled  Colonoscopy    Surgeon:       Date of procedure: WAITING FOR RN TO DO ASSESSMENT ON PATIENT - WILL CALL BACK       Pre-OP / PAC:   No - Not required for this site.    Location       Sedation   Moderate Sedation - Per order.      Patient Reminders:   You will receive a call from a Nurse to review instructions and health history.  This assessment must be completed prior to your procedure.  Failure to complete the Nurse assessment may result in the procedure being " cancelled.      On the day of your procedure, please designate an adult(s) who can drive you home stay with you for the next 24 hours. The medicines used in the exam will make you sleepy. You will not be able to drive.      You cannot take public transportation, ride share services, or non-medical taxi service without a responsible caregiver.  Medical transport services are allowed with the requirement that a responsible caregiver will receive you at your destination.  We require that drivers and caregivers are confirmed prior to your procedure.

## 2025-03-03 NOTE — PROGRESS NOTES
SUBJECTIVE:  Mehran Ibarra is a 45 year old male who presents to the office with the CC of chest pain. Lateral mid thoracic pain.   Patient complains of mild pain.  The pain is characterized as  2 out of 10 achey located left chest with radiation to none. Symptoms began 2 week(s) ago, still present  Pain is associated with none and though has had 17 pounds weight loss last 1 month, work up started by PCP.  Pain is exacerbated by pushing on it.  Pain is relieved by none.      Past Medical History:   Diagnosis Date    CARDIOVASCULAR SCREENING; LDL GOAL LESS THAN 160 6/9/2012    Elevated blood pressure reading without diagnosis of hypertension 8/21/2015    Generalized anxiety disorder 6/8/2012     Diagnosis updated by automated process. Provider to review and confirm.    GERD (gastroesophageal reflux disease) 3/8/2011    Global developmental delay     IBS (irritable bowel syndrome) 3/8/2011    With constipation     Left ventricular hypertrophy 10/1/2017    Echo 9/17, referral Dr Lopez annual followup, and prn. MR in future.    Pectus excavatum 6/8/2012    Scolioses     Undiagnosed cardiac murmurs 9/11/2017         Current Outpatient Medications:     amphetamine-dextroamphetamine (ADDERALL XR) 5 MG 24 hr capsule, Take 1 capsule (5 mg) by mouth 2 times daily., Disp: 60 capsule, Rfl: 0    amphetamine-dextroamphetamine (ADDERALL XR) 5 MG 24 hr capsule, Take 1 capsule (5 mg) by mouth every morning., Disp: 60 capsule, Rfl: 0    amphetamine-dextroamphetamine (ADDERALL XR) 5 MG 24 hr capsule, Take 1 capsule (5 mg) by mouth 2 times daily, Disp: 60 capsule, Rfl: 0    apixaban ANTICOAGULANT (ELIQUIS) 5 MG tablet, Take 1 tablet (5 mg) by mouth 2 times daily., Disp: 180 tablet, Rfl: 3    citalopram (CELEXA) 10 MG tablet, TAKE 1 TABLET(10 MG) BY MOUTH DAILY, Disp: 90 tablet, Rfl: 3    guanFACINE (TENEX) 1 MG tablet, TAKE 1 TABLET(1 MG) BY MOUTH AT BEDTIME, Disp: 90 tablet, Rfl: 3    metoprolol tartrate (LOPRESSOR) 25 MG tablet, Take  1 tablet (25 mg) by mouth 2 times daily., Disp: 180 tablet, Rfl: 3    benzonatate (TESSALON) 100 MG capsule, Take 1-2 capsules by mouth up to 3 x a day as needed with food (Patient not taking: Reported on 2/15/2025), Disp: 45 capsule, Rfl: 0    guaiFENesin (MUCINEX) 600 MG 12 hr tablet, Take 1-2 tablets as needed up to twice a day (Patient not taking: Reported on 2/15/2025), Disp: 30 tablet, Rfl: 0    Social History     Tobacco Use    Smoking status: Former     Current packs/day: 0.00     Average packs/day: (0.2 ttl pk-yrs)     Types: Cigarettes, Cigars     Start date: 2003     Quit date: 2013     Years since quittin.0    Smokeless tobacco: Never   Substance Use Topics    Alcohol use: No       ROS:Review of systems negative except as stated above.    EXAM:  /78   Pulse 63   Temp 97  F (36.1  C) (Temporal)   Resp 16   Wt 68.9 kg (152 lb)   SpO2 97%   BMI 23.44 kg/m    GENERAL APPEARANCE: healthy, alert and no distress  RESP: lungs clear to auscultation - no rales, rhonchi or wheezes  CV: regular rates and rhythm, normal S1 S2, no murmur noted  NEURO: Normal strength and tone, sensory exam grossly normal,  normal speech and mentation  SKIN: no suspicious lesions or rashes     EKG: consistent with previous on file    Results for orders placed or performed in visit on 25   CBC with platelets     Status: Normal   Result Value Ref Range    WBC Count 9.8 4.0 - 11.0 10e3/uL    RBC Count 5.64 4.40 - 5.90 10e6/uL    Hemoglobin 16.2 13.3 - 17.7 g/dL    Hematocrit 48.5 40.0 - 53.0 %    MCV 86 78 - 100 fL    MCH 28.7 26.5 - 33.0 pg    MCHC 33.4 31.5 - 36.5 g/dL    RDW 12.3 10.0 - 15.0 %    Platelet Count 385 150 - 450 10e3/uL         Assessment  / IMPRESSION  (M54.6) Acute left-sided thoracic back pain  (primary encounter diagnosis)  Comment: unclear etiology, does not appear to be acute cardiac  Plan: EKG 12-lead complete w/read - Clinics, XR Ribs        & Chest Left G/E 3 Views, CBC with  platelets,         Lipase, Amylase          Patient Instructions   Follow up with PCP this week    ER with worsening symptoms

## 2025-03-18 DIAGNOSIS — F90.0 ATTENTION DEFICIT HYPERACTIVITY DISORDER (ADHD), PREDOMINANTLY INATTENTIVE TYPE: ICD-10-CM

## 2025-03-19 RX ORDER — DEXTROAMPHETAMINE SACCHARATE, AMPHETAMINE ASPARTATE MONOHYDRATE, DEXTROAMPHETAMINE SULFATE AND AMPHETAMINE SULFATE 1.25; 1.25; 1.25; 1.25 MG/1; MG/1; MG/1; MG/1
5 CAPSULE, EXTENDED RELEASE ORAL 2 TIMES DAILY
Qty: 60 CAPSULE | Refills: 0 | Status: SHIPPED | OUTPATIENT
Start: 2025-03-19

## 2025-03-27 ENCOUNTER — DOCUMENTATION ONLY (OUTPATIENT)
Dept: OTHER | Facility: CLINIC | Age: 45
End: 2025-03-27
Payer: COMMERCIAL

## 2025-04-07 ENCOUNTER — OFFICE VISIT (OUTPATIENT)
Dept: FAMILY MEDICINE | Facility: CLINIC | Age: 45
End: 2025-04-07
Payer: COMMERCIAL

## 2025-04-07 VITALS
RESPIRATION RATE: 13 BRPM | HEART RATE: 50 BPM | HEIGHT: 67 IN | SYSTOLIC BLOOD PRESSURE: 116 MMHG | WEIGHT: 148.5 LBS | BODY MASS INDEX: 23.31 KG/M2 | DIASTOLIC BLOOD PRESSURE: 76 MMHG | TEMPERATURE: 97.6 F | OXYGEN SATURATION: 96 %

## 2025-04-07 DIAGNOSIS — K58.1 IRRITABLE BOWEL SYNDROME WITH CONSTIPATION: ICD-10-CM

## 2025-04-07 DIAGNOSIS — R63.4 UNINTENTIONAL WEIGHT LOSS: Primary | ICD-10-CM

## 2025-04-07 PROCEDURE — 99213 OFFICE O/P EST LOW 20 MIN: CPT | Performed by: FAMILY MEDICINE

## 2025-04-07 PROCEDURE — 3078F DIAST BP <80 MM HG: CPT | Performed by: FAMILY MEDICINE

## 2025-04-07 PROCEDURE — 3074F SYST BP LT 130 MM HG: CPT | Performed by: FAMILY MEDICINE

## 2025-04-07 PROCEDURE — 1126F AMNT PAIN NOTED NONE PRSNT: CPT | Performed by: FAMILY MEDICINE

## 2025-04-07 ASSESSMENT — PAIN SCALES - GENERAL: PAINLEVEL_OUTOF10: NO PAIN (0)

## 2025-04-07 NOTE — PROGRESS NOTES
"Assessment & Plan     Unintentional weight loss, unresolved  IBS, well-managed with fiber supplement  Patient continuing to lose weight, but otherwise well. Given absence of ongoing symptoms and negative diagnostic workup, will plan for watchful waiting with follow-up in July after colonoscopy. If patient continues to lose weight and colonoscopy is negative, next step would be CT chest-abdomen-pelvis to rule out malignancy.    Mercedes Laurent is a 45 year old, presenting for the following health issues:  Follow Up      4/7/2025     9:07 AM   Additional Questions   Roomed by june   Accompanied by saud     History of Present Illness       Reason for visit:  Follow up for digestive issues and weight loss    He eats 2-3 servings of fruits and vegetables daily.He consumes 1 sweetened beverage(s) daily.He exercises with enough effort to increase his heart rate 20 to 29 minutes per day.  He exercises with enough effort to increase his heart rate 4 days per week.   He is taking medications regularly.      Mehran Ibarra is a 46yo with PMH IBS, intellectual delay, mitral valve regurgitation who presents today for a follow-up for unintentional weight loss and abdominal pain. He is accompanied by his mother at this visit.    The patient has lost 4 pounds since his last visit on 2/24/25, for a total loss of 21 lbs (12% body weight) since January. The patient states that he is feeling well, his abdominal pain has resolved, and he has a good appetite. He is no longer taking TUMS routinely. He has a colonoscopy scheduled for July.    The patient has been taking daily fiber supplements with citrosil, and states that his stools has been easily and \"usually loose.\" He denies pain with defecation, and states that he's been eating vegetables more regularly. He is a poor historian and is unclear about how often he's been stooling. He states \"I just go when I need to.\"    He and his mother feel that his appetite has been good. He " "typically eats one large meal a day for lunch, with a smaller meal for dinner. He reports that he feels full after eating his usual meals. He also eats ice cream regularly as a snack.     Denies nausea, chest pain, shortness of breath, constipation/diarrhea.    Review of Systems  Constitutional, HEENT, cardiovascular, pulmonary, gi and gu systems are negative, except as otherwise noted.      Objective    /76   Pulse 50   Temp 97.6  F (36.4  C) (Temporal)   Resp 13   Ht 1.705 m (5' 7.13\")   Wt 67.4 kg (148 lb 8 oz)   SpO2 96%   BMI 23.17 kg/m    Body mass index is 23.17 kg/m .  Physical Exam  Constitutional:       General: He is not in acute distress.     Appearance: Normal appearance. He is not ill-appearing or toxic-appearing.   Cardiovascular:      Rate and Rhythm: Normal rate and regular rhythm.      Pulses: Normal pulses.      Heart sounds: Murmur heard.   Pulmonary:      Effort: Pulmonary effort is normal. No respiratory distress.      Breath sounds: Normal breath sounds. No stridor. No wheezing or rales.   Abdominal:      General: Abdomen is flat. Bowel sounds are normal. There is no distension.      Palpations: Abdomen is soft. There is no mass.      Tenderness: There is no abdominal tenderness. There is no guarding.      Hernia: No hernia is present.   Skin:     General: Skin is warm and dry.      Capillary Refill: Capillary refill takes less than 2 seconds.   Neurological:      Mental Status: He is alert.   Psychiatric:         Mood and Affect: Mood normal.         Behavior: Behavior normal.         Thought Content: Thought content normal.         Judgment: Judgment normal.              IVANA Og, [employer]  The information in this document, created by the nurse practioner student for me, accurately reflects the services I personally performed and the decisions made by me. I have reviewed and approved this document for accuracy prior to leaving the " patient care area.    Signed Electronically by: Arpan Flores MD

## 2025-04-07 NOTE — PATIENT INSTRUCTIONS
It's so good to hear that you're feeling well! Your diagnostic workup was negative. Because of this, I think it would be best to just wait and watch before doing further testing. Please schedule a follow-up here at the clinic after you have your colonoscopy and we can figure out next steps.

## 2025-04-24 ENCOUNTER — HOSPITAL ENCOUNTER (OUTPATIENT)
Dept: CT IMAGING | Facility: CLINIC | Age: 45
Discharge: HOME | End: 2025-04-24
Attending: FAMILY MEDICINE
Payer: COMMERCIAL

## 2025-04-24 DIAGNOSIS — R63.4 RECENT UNEXPLAINED WEIGHT LOSS: ICD-10-CM

## 2025-04-24 PROCEDURE — 250N000009 HC RX 250: Performed by: FAMILY MEDICINE

## 2025-04-24 PROCEDURE — 74177 CT ABD & PELVIS W/CONTRAST: CPT

## 2025-04-24 PROCEDURE — 250N000011 HC RX IP 250 OP 636: Performed by: FAMILY MEDICINE

## 2025-04-24 RX ORDER — IOPAMIDOL 755 MG/ML
72 INJECTION, SOLUTION INTRAVASCULAR ONCE
Status: COMPLETED | OUTPATIENT
Start: 2025-04-24 | End: 2025-04-24

## 2025-04-24 RX ADMIN — SODIUM CHLORIDE 61 ML: 9 INJECTION, SOLUTION INTRAVENOUS at 08:27

## 2025-04-24 RX ADMIN — IOPAMIDOL 72 ML: 755 INJECTION, SOLUTION INTRAVENOUS at 08:27

## 2025-04-27 DIAGNOSIS — R93.7 ABNORMAL CT OF THORACIC SPINE: Primary | ICD-10-CM

## 2025-05-05 ENCOUNTER — ANCILLARY PROCEDURE (OUTPATIENT)
Dept: GENERAL RADIOLOGY | Facility: CLINIC | Age: 45
End: 2025-05-05
Attending: FAMILY MEDICINE
Payer: COMMERCIAL

## 2025-05-05 DIAGNOSIS — R93.7 ABNORMAL CT OF THORACIC SPINE: ICD-10-CM

## 2025-05-05 PROCEDURE — 72070 X-RAY EXAM THORAC SPINE 2VWS: CPT | Mod: TC | Performed by: RADIOLOGY

## 2025-05-05 NOTE — RESULT ENCOUNTER NOTE
Jakob Laurent: Your recent spine XRs show a stable old compression fracture of the 12th thoracic spine vertebra. Please contact if questions.     Arpan Flores MD

## 2025-06-23 ENCOUNTER — MYC MEDICAL ADVICE (OUTPATIENT)
Dept: FAMILY MEDICINE | Facility: CLINIC | Age: 45
End: 2025-06-23
Payer: COMMERCIAL

## 2025-06-23 NOTE — TELEPHONE ENCOUNTER
Called pt's mother, confirmed she saw the results. Pt has no current back complaints.   Thank you,  Milton MEEHAN  [facility]

## 2025-07-09 ENCOUNTER — OFFICE VISIT (OUTPATIENT)
Dept: FAMILY MEDICINE | Facility: CLINIC | Age: 45
End: 2025-07-09
Payer: COMMERCIAL

## 2025-07-09 ENCOUNTER — RESULTS FOLLOW-UP (OUTPATIENT)
Dept: FAMILY MEDICINE | Facility: CLINIC | Age: 45
End: 2025-07-09

## 2025-07-09 VITALS
DIASTOLIC BLOOD PRESSURE: 76 MMHG | TEMPERATURE: 97.6 F | WEIGHT: 143.5 LBS | BODY MASS INDEX: 22.52 KG/M2 | HEART RATE: 52 BPM | SYSTOLIC BLOOD PRESSURE: 116 MMHG | OXYGEN SATURATION: 97 % | RESPIRATION RATE: 13 BRPM | HEIGHT: 67 IN

## 2025-07-09 DIAGNOSIS — R63.4 UNINTENTIONAL WEIGHT LOSS: Primary | ICD-10-CM

## 2025-07-09 DIAGNOSIS — I48.0 PAROXYSMAL ATRIAL FIBRILLATION (H): ICD-10-CM

## 2025-07-09 DIAGNOSIS — R73.01 ELEVATED FASTING GLUCOSE: Primary | ICD-10-CM

## 2025-07-09 DIAGNOSIS — R73.01 ELEVATED FASTING GLUCOSE: ICD-10-CM

## 2025-07-09 DIAGNOSIS — F90.0 ATTENTION DEFICIT HYPERACTIVITY DISORDER (ADHD), PREDOMINANTLY INATTENTIVE TYPE: ICD-10-CM

## 2025-07-09 LAB
ALBUMIN SERPL BCG-MCNC: 4.7 G/DL (ref 3.5–5.2)
ALP SERPL-CCNC: 80 U/L (ref 40–150)
ALT SERPL W P-5'-P-CCNC: 25 U/L (ref 0–70)
ANION GAP SERPL CALCULATED.3IONS-SCNC: 10 MMOL/L (ref 7–15)
AST SERPL W P-5'-P-CCNC: 28 U/L (ref 0–45)
BASOPHILS # BLD AUTO: 0 10E3/UL (ref 0–0.2)
BASOPHILS NFR BLD AUTO: 0 %
BILIRUB SERPL-MCNC: 1.2 MG/DL
BUN SERPL-MCNC: 13.6 MG/DL (ref 6–20)
CALCIUM SERPL-MCNC: 9.9 MG/DL (ref 8.8–10.4)
CHLORIDE SERPL-SCNC: 105 MMOL/L (ref 98–107)
CREAT SERPL-MCNC: 0.72 MG/DL (ref 0.67–1.17)
EGFRCR SERPLBLD CKD-EPI 2021: >90 ML/MIN/1.73M2
EOSINOPHIL # BLD AUTO: 0.1 10E3/UL (ref 0–0.7)
EOSINOPHIL NFR BLD AUTO: 1 %
ERYTHROCYTE [DISTWIDTH] IN BLOOD BY AUTOMATED COUNT: 11.8 % (ref 10–15)
GLUCOSE SERPL-MCNC: 102 MG/DL (ref 70–99)
HCO3 SERPL-SCNC: 24 MMOL/L (ref 22–29)
HCT VFR BLD AUTO: 45.6 % (ref 40–53)
HGB BLD-MCNC: 15.6 G/DL (ref 13.3–17.7)
IMM GRANULOCYTES # BLD: 0 10E3/UL
IMM GRANULOCYTES NFR BLD: 0 %
LYMPHOCYTES # BLD AUTO: 1.6 10E3/UL (ref 0.8–5.3)
LYMPHOCYTES NFR BLD AUTO: 18 %
MCH RBC QN AUTO: 29.3 PG (ref 26.5–33)
MCHC RBC AUTO-ENTMCNC: 34.2 G/DL (ref 31.5–36.5)
MCV RBC AUTO: 86 FL (ref 78–100)
MONOCYTES # BLD AUTO: 0.7 10E3/UL (ref 0–1.3)
MONOCYTES NFR BLD AUTO: 8 %
NEUTROPHILS # BLD AUTO: 6.4 10E3/UL (ref 1.6–8.3)
NEUTROPHILS NFR BLD AUTO: 72 %
PLATELET # BLD AUTO: 293 10E3/UL (ref 150–450)
POTASSIUM SERPL-SCNC: 4.4 MMOL/L (ref 3.4–5.3)
PROT SERPL-MCNC: 6.8 G/DL (ref 6.4–8.3)
RBC # BLD AUTO: 5.32 10E6/UL (ref 4.4–5.9)
SODIUM SERPL-SCNC: 139 MMOL/L (ref 135–145)
WBC # BLD AUTO: 8.9 10E3/UL (ref 4–11)

## 2025-07-09 PROCEDURE — 3074F SYST BP LT 130 MM HG: CPT | Performed by: FAMILY MEDICINE

## 2025-07-09 PROCEDURE — 3078F DIAST BP <80 MM HG: CPT | Performed by: FAMILY MEDICINE

## 2025-07-09 PROCEDURE — 36415 COLL VENOUS BLD VENIPUNCTURE: CPT | Performed by: FAMILY MEDICINE

## 2025-07-09 PROCEDURE — 83036 HEMOGLOBIN GLYCOSYLATED A1C: CPT | Performed by: FAMILY MEDICINE

## 2025-07-09 PROCEDURE — 99214 OFFICE O/P EST MOD 30 MIN: CPT | Performed by: FAMILY MEDICINE

## 2025-07-09 PROCEDURE — 85025 COMPLETE CBC W/AUTO DIFF WBC: CPT | Performed by: FAMILY MEDICINE

## 2025-07-09 PROCEDURE — 80053 COMPREHEN METABOLIC PANEL: CPT | Performed by: FAMILY MEDICINE

## 2025-07-09 RX ORDER — DEXTROAMPHETAMINE SACCHARATE, AMPHETAMINE ASPARTATE MONOHYDRATE, DEXTROAMPHETAMINE SULFATE AND AMPHETAMINE SULFATE 1.25; 1.25; 1.25; 1.25 MG/1; MG/1; MG/1; MG/1
5 CAPSULE, EXTENDED RELEASE ORAL 2 TIMES DAILY
Qty: 60 CAPSULE | Refills: 0 | Status: SHIPPED | OUTPATIENT
Start: 2025-08-08 | End: 2025-09-07

## 2025-07-09 RX ORDER — DEXTROAMPHETAMINE SACCHARATE, AMPHETAMINE ASPARTATE MONOHYDRATE, DEXTROAMPHETAMINE SULFATE AND AMPHETAMINE SULFATE 1.25; 1.25; 1.25; 1.25 MG/1; MG/1; MG/1; MG/1
5 CAPSULE, EXTENDED RELEASE ORAL 2 TIMES DAILY
Qty: 60 CAPSULE | Refills: 0 | Status: SHIPPED | OUTPATIENT
Start: 2025-09-07 | End: 2025-10-07

## 2025-07-09 RX ORDER — DEXTROAMPHETAMINE SACCHARATE, AMPHETAMINE ASPARTATE MONOHYDRATE, DEXTROAMPHETAMINE SULFATE AND AMPHETAMINE SULFATE 1.25; 1.25; 1.25; 1.25 MG/1; MG/1; MG/1; MG/1
5 CAPSULE, EXTENDED RELEASE ORAL 2 TIMES DAILY
Qty: 60 CAPSULE | Refills: 0 | Status: SHIPPED | OUTPATIENT
Start: 2025-07-09 | End: 2025-08-08

## 2025-07-09 NOTE — PROGRESS NOTES
Assessment & Plan     Unintentional weight loss:  - Weight loss of 30 lbs from January to March was unexplained but has stabilized since April. CT abdomen pelvis and labs from 3-4 months ago were normal. Afebrile or night sweats reported.  - Recommend checking labs again to ensure stability.    Attention deficit hyperactivity disorder (ADHD), predominantly inattentive type:  - ADHD managed with adderall 5 mg twice a day. Previous psychiatrist adjusted dosing due to difficulty obtaining medication.  - Continue adderall, sourced from [facility]. Monitor medication supply and adjust pharmacy as needed.    Paroxysmal atrial fibrillation (H):  - Atrial fibrillation episode associated with pneumonia led to hospitalization. Managed with metoprolol and eliquis. No recent falls or bruising reported.  - Continue eliquis and metoprolol. Attempt to obtain a 90-day supply of eliquis from [facility] for convenience.    Consent was obtained from the patient to use an AI documentation tool in the creation of this note.      Mercedes Laurent is a 45 year old, presenting for the following health issues:  Medication Refill (MEET NEW DOCTOR )        7/9/2025     9:57 AM   Additional Questions   Roomed by Lisseth HALL   Accompanied by mother     Medication Refill    History of Present Illness       Reason for visit:  Meet new doctor for prescription refill   He is taking medications regularly.   Mehran Ibarra, 45 years    - January to March 2025: Experienced unexplained 30 lb weight loss.  - March 2025: No fever or night sweats reported during period of weight loss.  - Mid-April 2025: Weight stabilized, current weight between 139-145 lbs. Last home weight was 139 lbs, in clinic today 143 lbs. Weight has remained stable in the 139-145 lb range since mid-April.  - 3-4 months prior to this visit: Labs performed, results within normal limits.  - Prior to this visit: CT abdomen/pelvis performed, no abnormalities  "found.  - July 2025: First colonoscopy scheduled due to age and recent weight loss.  - No pain or trouble eating reported at any time.  - Using fiber supplements (Metamucil and others) for regularity, reports benefit.  - Longstanding history of ADHD, on adderall 5 mg for years (since age 18-19). Previously trialed low-dose guanfacine (Tenex) and celexa for impulsivity and anxiety. Noted increased impulsivity when off guanfacine. History of impulsivity, including urge to touch others' hair, improved with medication.  - Hospitalized for atrial fibrillation episode with pneumonia (date not specified, but prior to this visit). Defibrillator implant placed following hospitalization. Placed on eliquis and metoprolol after hospital discharge. Under care of cardiologist at [facility].  - No recent falls or bruising reported.        Objective    /76 (BP Location: Right arm, Patient Position: Sitting, Cuff Size: Adult Regular)   Pulse 52   Temp 97.6  F (36.4  C) (Temporal)   Resp 13   Ht 1.702 m (5' 7\")   Wt 65.1 kg (143 lb 8 oz)   SpO2 97%   BMI 22.48 kg/m    Body mass index is 22.48 kg/m .  Physical Exam  Constitutional:       General: He is not in acute distress.  Eyes:      General: No scleral icterus.  Pulmonary:      Effort: No respiratory distress.   Musculoskeletal:      Right lower leg: No edema.      Left lower leg: No edema.   Skin:     Findings: No bruising.   Neurological:      Mental Status: He is alert.   Psychiatric:         Mood and Affect: Mood normal.         Behavior: Behavior normal.                  Signed Electronically by: Roberto Jennings,     "

## 2025-07-10 LAB
EST. AVERAGE GLUCOSE BLD GHB EST-MCNC: 111 MG/DL
HBA1C MFR BLD: 5.5 % (ref 0–5.6)

## 2025-07-14 ENCOUNTER — TELEPHONE (OUTPATIENT)
Dept: GASTROENTEROLOGY | Facility: CLINIC | Age: 45
End: 2025-07-14
Payer: COMMERCIAL

## 2025-07-14 NOTE — TELEPHONE ENCOUNTER
Patient has co legal guardians: Kay Kowalski (mother) and Luisa Wellington (sister) per chart review.  Please ensure legal guardians will be available in person or by telephone on day of procedure to provide procedural consent.     ----------------------------------------------------------------------    Pre visit planning completed.      Procedure details:    Patient scheduled for Colonoscopy on 7/30/25.     Arrival time: 0845. Procedure time 0945    Facility location: [facility]; [address] [place] [id number]. Check in location: [address] [place] [address].     Sedation type: MAC - Intellectual disability, impulse control disorder, and anxiety.    Pre op exam needed? Yes. Patient needs to complete an in person pre-operative exam within 30 days of procedure. Informed patient pre op is needed via [employer]. Of mention, patient had an OV on 7/9/25 but too brief and does not include a full H&P. Will need a pre op exam appointment for H&P.     Indication for procedure: Screening      Chart review:    Electronic implanted devices? Yes: ICD    Recent diagnosis of diverticulitis within the last 6 weeks? No      Medication review:    Diabetic? No    Anticoagulants? Yes Apixaban (Eliquis): Recommended HOLD 2 days before procedure.  Consult with your managing provider.    Weight loss medication/injectable? No GLP-1 medication per patient's medication list. Nursing to verify with pre-assessment call.    Other medication HOLDING recommendations:  Fiber supplement HOLD 7 days before procedure      Prep for procedure:    Bowel prep recommendation: Standard Miralax.   Due to: standard bowel prep    Procedure information and instructions sent via [employer]         Graciela Martin RN  Endoscopy Procedure Pre Assessment   [phone] option 3

## 2025-07-14 NOTE — TELEPHONE ENCOUNTER
Attempted to contact patient in order to complete pre assessment questions.     No answer. Left message to return call to [phone] option 3    Callback communication sent via OLED-T.      Milagro Hernandez RN  Endoscopy Procedure Pre Assessment

## 2025-07-16 NOTE — TELEPHONE ENCOUNTER
Pre assessment completed for upcoming procedure.   (Please see previous telephone encounter notes for complete details)    Completed PA call with Mother/Guardian Kay.     Procedure details:    Procedure date 7/30/25, arrival time 0845 and facility location reviewed.    Pre op exam needed? Yes. Patient needs to complete an in person pre-operative exam within 30 days of procedure. Informed patient pre op is needed via mychart.    Designated  policy reviewed. Instructed to have someone stay 24  hours post procedure.       Medication review:    Medications reviewed. Please see supporting documentation below. Holding recommendations discussed (if applicable).       Prep for procedure:     Procedure prep instructions reviewed.        Any additional information needed:  N/A      Legal guardian verbalized understanding and had no questions or concerns at this time.      Lilli Olsen RN  Endoscopy Procedure Pre Assessment   [phone] option 3

## 2025-07-24 ENCOUNTER — ANESTHESIA EVENT (OUTPATIENT)
Dept: GASTROENTEROLOGY | Facility: CLINIC | Age: 45
End: 2025-07-24
Payer: COMMERCIAL

## 2025-07-24 ENCOUNTER — PRE VISIT (OUTPATIENT)
Dept: SURGERY | Facility: CLINIC | Age: 45
End: 2025-07-24

## 2025-07-24 NOTE — TELEPHONE ENCOUNTER
FUTURE VISIT INFORMATION      SURGERY INFORMATION:  Date: 7/30/25  Location:  GI  Surgeon:  Jayson Oscar MD  Anesthesia Type:  MAC  Procedure: Colonoscopy  Consult: 7/9/25    RECORDS REQUESTED FROM:       Primary Care Provider: Roberto Jennings MD    Pertinent Medical History: Unintentional weight loss, paroxysmal atrial fibrillation    Most recent EKG+ Tracing: 3/3/25    Most recent ECHO: 9/26/17

## 2025-07-28 ENCOUNTER — OFFICE VISIT (OUTPATIENT)
Dept: SURGERY | Facility: CLINIC | Age: 45
End: 2025-07-28
Payer: COMMERCIAL

## 2025-07-28 ENCOUNTER — PRE VISIT (OUTPATIENT)
Dept: SURGERY | Facility: CLINIC | Age: 45
End: 2025-07-28

## 2025-07-28 VITALS
OXYGEN SATURATION: 98 % | DIASTOLIC BLOOD PRESSURE: 70 MMHG | WEIGHT: 140.4 LBS | HEIGHT: 67 IN | TEMPERATURE: 97.6 F | RESPIRATION RATE: 16 BRPM | BODY MASS INDEX: 22.03 KG/M2 | HEART RATE: 51 BPM | SYSTOLIC BLOOD PRESSURE: 104 MMHG

## 2025-07-28 DIAGNOSIS — Z13.9 SCREENING DUE: ICD-10-CM

## 2025-07-28 DIAGNOSIS — Z01.818 PREOP EXAMINATION: Primary | ICD-10-CM

## 2025-07-28 PROCEDURE — 99203 OFFICE O/P NEW LOW 30 MIN: CPT | Performed by: PHYSICIAN ASSISTANT

## 2025-07-28 RX ORDER — AMPICILLIN TRIHYDRATE 500 MG
1000 CAPSULE ORAL DAILY
COMMUNITY

## 2025-07-28 RX ORDER — CALCIUM CARBONATE 500 MG/1
1 TABLET, CHEWABLE ORAL PRN
COMMUNITY

## 2025-07-28 ASSESSMENT — LIFESTYLE VARIABLES: TOBACCO_USE: 1

## 2025-07-28 ASSESSMENT — PAIN SCALES - GENERAL: PAINLEVEL_OUTOF10: NO PAIN (0)

## 2025-07-28 ASSESSMENT — ENCOUNTER SYMPTOMS: DYSRHYTHMIAS: 1

## 2025-07-28 NOTE — PATIENT INSTRUCTIONS
Name:  Mehran Ibarra   MRN:  [id number]   :  1980   Today's Date:  2025     GI Lab procedures:    A representative from the GI Lab will contact you regarding arrival date and time.      You were seen today in the PAC Clinic.   (Pre-operative Anesthesia Assessment Center)  [address]  [address]   phone [phone]    You had a pre-operative assessment done.    Anesthesia recommendations for medications:    Hold Aspirin for 2 days before procedure.  Hold Multivitamins for 7 days before procedure.   Hold Herbal medications and Supplements for 7 days before procedure.Methylcellulose (Citrucel)   Hold Ibuprofen for 2 days before procedure.   Hold Naproxen for 2 days before procedure.     No alcohol or cannabis products for 24 hours before your procedure      Special instructions for anticoagulation medications:  Hold Eliquis for 48 hours before procedure      Please hold the following medications the day of procedure:  Vitamin D  Adderall  TUMS      Please take these medications the day of procedure:  Citalopram (Celexa)  Guanfacine (Tenex)  Metoprolol (Lopressor)    **Please follow bowel prep and diet instructions printed for today.**    For questions or appointments, call:  [facility]: [phone], option 3.  Monday through Friday, 8 a.m. to 4:30 p.m.  (If it is after hours, please reach out to the clinic or provider that scheduled your appointment)

## 2025-07-28 NOTE — H&P
Pre-Operative H & P     CC:  Preoperative exam to assess for increased cardiopulmonary risk while undergoing surgery and anesthesia.    Date of Encounter: 7/28/2025  Primary Care Physician:  Roberto Jennings     Reason for visit:   Encounter Diagnoses   Name Primary?    Preop examination Yes    Screening due        HPI  Mehran Ibarra is a 45 year old male who presents for pre-operative H & P in preparation for  Procedure Information       Case: [id number] Date/Time: 07/30/25 0945    Procedure: Colonoscopy (Rectum)    Anesthesia type: MAC    Diagnosis: Screen for colon cancer [Z12.11]    Pre-op diagnosis: Screen for colon cancer [Z12.11]    Location:  GI  [facility] / [facility]    Providers: Jayson Oscar MD            Mr. Ibarra has a past medical history significant for hypertrophic cardiomyopathy, paroxysmal atrial fibrillation on Eliquis, mitral valve regurgitation, dual-chamber ICD, developmental delay, ADD, anxiety, and thoracic scoliosis with pectus excavatum.  He has been experiencing unintentional weight loss and the above procedure is planned for screening purposes.    History is obtained from the patient and chart review    Hx of abnormal bleeding or anti-platelet use: Eliquis      Past Medical History  Past Medical History:   Diagnosis Date    CARDIOVASCULAR SCREENING; LDL GOAL LESS THAN 160 06/09/2012    Elevated blood pressure reading without diagnosis of hypertension 08/21/2015    Generalized anxiety disorder 06/08/2012     Diagnosis updated by automated process. Provider to review and confirm.    GERD (gastroesophageal reflux disease) 03/08/2011    Global developmental delay     IBS (irritable bowel syndrome) 03/08/2011    With constipation     Left ventricular hypertrophy 10/01/2017    Echo 9/17, referral Dr Lopez annual followup, and prn. MR in future.    Pectus excavatum 06/08/2012    Scolioses     Undiagnosed cardiac murmurs 09/11/2017       Past Surgical History  Past Surgical History:   Procedure  Laterality Date    HERNIA REPAIR, INGUINAL RT/LT      Hernia Repair, RT/LT    ingrown toenail      THORACOSCOPIC REPAIR PECTUS EXCAVATUM CHILD ([place])      tooth removal         Prior to Admission Medications  Current Outpatient Medications   Medication Sig Dispense Refill    amphetamine-dextroamphetamine (ADDERALL XR) 5 MG 24 hr capsule Take 1 capsule (5 mg) by mouth 2 times daily. (Patient taking differently: Take 5 mg by mouth 2 times daily as needed.) 60 capsule 0    [START ON 8/8/2025] amphetamine-dextroamphetamine (ADDERALL XR) 5 MG 24 hr capsule Take 1 capsule (5 mg) by mouth 2 times daily. (Patient taking differently: Take 5 mg by mouth 2 times daily as needed.) 60 capsule 0    [START ON 9/7/2025] amphetamine-dextroamphetamine (ADDERALL XR) 5 MG 24 hr capsule Take 1 capsule (5 mg) by mouth 2 times daily. (Patient taking differently: Take 5 mg by mouth 2 times daily as needed.) 60 capsule 0    amphetamine-dextroamphetamine (ADDERALL XR) 5 MG 24 hr capsule TAKE 1 CAPSULE (5 MG) BY MOUTH 2 TIMES DAILY. (Patient taking differently: Take 5 mg by mouth 2 times daily as needed.) 60 capsule 0    apixaban ANTICOAGULANT (ELIQUIS) 5 MG tablet Take 1 tablet (5 mg) by mouth 2 times daily. 180 tablet 1    calcium carbonate (TUMS) 500 MG chewable tablet Take 1 chew tab by mouth as needed for heartburn.      citalopram (CELEXA) 10 MG tablet Take 1 tablet (10 mg) by mouth daily. (Patient taking differently: Take 10 mg by mouth every morning.) 90 tablet 1    guanFACINE (TENEX) 1 MG tablet Take 1 tablet (1 mg) by mouth at bedtime. 90 tablet 1    methylcellulose (CITRUCEL) 500 MG TABS tablet Take 500 mg by mouth as needed.      metoprolol tartrate (LOPRESSOR) 25 MG tablet Take 1 tablet (25 mg) by mouth 2 times daily. 180 tablet 0    Cholecalciferol (D 1000) 25 MCG (1000 UT) CAPS Take 1,000 Units by mouth daily.         Allergies  No Known Allergies    Social History  Social History     Socioeconomic History    Marital status:  Single     Spouse name: Not on file    Number of children: Not on file    Years of education: Not on file    Highest education level: Not on file   Occupational History    Not on file   Tobacco Use    Smoking status: Former     Current packs/day: 0.00     Average packs/day: (0.2 ttl pk-yrs)     Types: Cigarettes, Cigars     Start date: 2003     Quit date: 2013     Years since quittin.4    Smokeless tobacco: Never   Vaping Use    Vaping status: Never Used   Substance and Sexual Activity    Alcohol use: No    Drug use: No    Sexual activity: Not Currently     Partners: Female     Birth control/protection: Abstinence   Other Topics Concern    Parent/sibling w/ CABG, MI or angioplasty before 65F 55M? No   Social History Narrative    Not on file     Social Drivers of Health     Financial Resource Strain: Low Risk  (2024)    Financial Resource Strain     Within the past 12 months, have you or your family members you live with been unable to get utilities (heat, electricity) when it was really needed?: No   Food Insecurity: Low Risk  (2024)    Food Insecurity     Within the past 12 months, did you worry that your food would run out before you got money to buy more?: No     Within the past 12 months, did the food you bought just not last and you didn t have money to get more?: No   Transportation Needs: Low Risk  (2024)    Transportation Needs     Within the past 12 months, has lack of transportation kept you from medical appointments, getting your medicines, non-medical meetings or appointments, work, or from getting things that you need?: No   Physical Activity: Sufficiently Active (2024)    Exercise Vital Sign     Days of Exercise per Week: 5 days     Minutes of Exercise per Session: 60 min   Stress: No Stress Concern Present (2024)    [employer] [place] [employer] - Occupational Stress Questionnaire     Feeling of Stress : Not at all   Social Connections: Unknown  (11/25/2024)    Social Connection and Isolation Panel [NHANES]     Frequency of Communication with Friends and Family: Not on file     Frequency of Social Gatherings with Friends and Family: Twice a week     Attends Ep[employer] Services: Not on file     Active Member of Clubs or Organizations: Not on file     Attends Club or Organization Meetings: Not on file     Marital Status: Not on file   Interpersonal Safety: Not on file   Housing Stability: Low Risk  (11/25/2024)    Housing Stability     Do you have housing? : Yes     Are you worried about losing your housing?: No       Family History  Family History   Problem Relation Age of Onset    Cardiovascular Mother     Hypertrophic cardiomyopathy Mother     Hypertension Father     Colitis Father     Alopecia Father     Hypertension Maternal Grandmother     Heart Failure Maternal Grandmother     Heart Disease Maternal Grandmother     Diverticulitis Maternal Grandmother     Diabetes Paternal Grandfather     Anesthesia Reaction No family hx of     Venous thrombosis No family hx of        Review of Systems  The complete review of systems is negative other than noted in the HPI or here.     Anesthesia Evaluation   Pt has had prior anesthetic.     No history of anesthetic complications       ROS/MED HX  ENT/Pulmonary:     (+)                tobacco use, Past use,                    (-) asthma and recent URI   Neurologic: Comment: ADHD  Developmental delay    (+)                         Developmental delay,       Cardiovascular: Comment: Hypertrophic cardiomyopathy    (+)  - -   -  - -   Taking blood thinners                ICD   type;[employer]   dysrhythmias (NSVT), a-fib,  valvular problems/murmurs type: MR mild to moderate.    Previous cardiac testing   Echo: Date: 10/1/24 Results:  Final Impressions:    1. Hypertrophic cardiomyopathy.    2. Small LV cavity size, EF of 70 - 75%.    3. Severe asymmetric septal hypertrophy.    4. Systolic anterior motion of mitral  "leaflet, no significant LVOT obstruction detected, peak Valsalva gradient 22 mmHg.    5. The mitral valve is normal, mild to moderate mitral regurgitation.    6. Echo contrast was administered to enhance visualization of all left ventricular segments.     Stress Test:  Date: Results:    ECG Reviewed:  Date: 10/30/24 Results:  Sinus bradycardia   Possible Left atrial enlargement   Incomplete right bundle branch block   Left anterior fascicular block   Left ventricular hypertrophy with repolarization abnormality ( R in aVL , [place]   product , Romhilt-Zarco )   Possible Anterolateral infarct , age undetermined   Abnormal ECG     Cath:  Date: Results:      METS/Exercise Tolerance: >4 METS    Hematologic:  - neg hematologic  ROS     Musculoskeletal: Comment: Scoliosis   Pectus excavatum      GI/Hepatic: Comment: IBS    (+) GERD (intermittent, uses OTC TUMS),                   Renal/Genitourinary:  - neg Renal ROS     Endo:  - neg endo ROS     Psychiatric/Substance Use:     (+) psychiatric history anxiety       Infectious Disease:  - neg infectious disease ROS     Malignancy:  - neg malignancy ROS     Other:  - neg other ROS          /70 (BP Location: Right arm, Patient Position: Sitting, Cuff Size: Adult Regular)   Pulse 51   Temp 97.6  F (36.4  C) (Oral)   Resp 16   Ht 1.702 m (5' 7\")   Wt 63.7 kg (140 lb 6.4 oz)   SpO2 98%   BMI 21.99 kg/m      Physical Exam   Constitutional: Awake, alert, cooperative, no apparent distress, and appears stated age.  Eyes: Pupils equal, round and reactive to light, extra ocular muscles intact, sclera clear, conjunctiva normal.  HENT: Normocephalic, oral pharynx with moist mucus membranes, good dentition. No goiter appreciated.   Respiratory: Clear to auscultation bilaterally, no crackles or wheezing.  Cardiovascular: Regular rate and rhythm, and murmur noted.  No edema. Palpable pulses to radial arteries.   GI: soft, non-distended, non-tender  Lymph/Hematologic: No " cervical lymphadenopathy and no supraclavicular lymphadenopathy.  Genitourinary:  deferred  Skin: Warm and dry.  No rashes at anticipated surgical site.   Musculoskeletal: Limited ROM of neck. Head forward position. Pectus excavatum. There is no redness, warmth, or swelling of the joints. Gross motor strength is normal.    Neurologic: Awake, alert, oriented to name, place and time. Cranial nerves II-XII are grossly intact. Gait is normal.   Neuropsychiatric: Calm, cooperative. Normal affect.     Prior Labs/Diagnostic Studies   All labs and imaging pertinent to the visit personally reviewed     Component      Latest Ref Rng 7/9/2025  10:32 AM   Sodium      135 - 145 mmol/L 139    Potassium      3.4 - 5.3 mmol/L 4.4    Carbon Dioxide (CO2)      22 - 29 mmol/L 24    Anion Gap      7 - 15 mmol/L 10    Urea Nitrogen      6.0 - 20.0 mg/dL 13.6    Creatinine      0.67 - 1.17 mg/dL 0.72    GFR Estimate      >60 mL/min/1.73m2 >90    Calcium      8.8 - 10.4 mg/dL 9.9    Chloride      98 - 107 mmol/L 105    Glucose      70 - 99 mg/dL 102 (H)    Alkaline Phosphatase      40 - 150 U/L 80    AST      0 - 45 U/L 28    ALT      0 - 70 U/L 25    Protein Total      6.4 - 8.3 g/dL 6.8    Albumin      3.5 - 5.2 g/dL 4.7    Bilirubin Total      <=1.2 mg/dL 1.2       Legend:  (H) High    Component      Latest Ref Rn 7/9/2025  10:32 AM   WBC      4.0 - 11.0 10e3/uL 8.9    RBC Count      4.40 - 5.90 10e6/uL 5.32    Hemoglobin      13.3 - 17.7 g/dL 15.6    Hematocrit      40.0 - 53.0 % 45.6    MCV      78 - 100 fL 86    MCH      26.5 - 33.0 pg 29.3    MCHC      31.5 - 36.5 g/dL 34.2    RDW      10.0 - 15.0 % 11.8    Platelet Count      150 - 450 10e3/uL 293    % Neutrophils      % 72    % Lymphocytes      % 18    % Monocytes      % 8    % Eosinophils      % 1    % Basophils      % 0    % Immature Granulocytes      % 0    Absolute Neutrophil      1.6 - 8.3 10e3/uL 6.4    Absolute Lymphocytes      0.8 - 5.3 10e3/uL 1.6    Absolute  Monocytes      0.0 - 1.3 10e3/uL 0.7    Absolute Eosinophils      0.0 - 0.7 10e3/uL 0.1    Absolute Basophils      0.0 - 0.2 10e3/uL 0.0    Absolute Immature Granulocytes      <=0.4 10e3/uL 0.0          EKG/echo /stress test - if available please see in ROS above     Cardiac device check 4/14/2025:  [facility] note:    Received remote alert on 4/12/2025 (reading alert on 4/14/25) for > 6 hours of Afib.    Patient had 8.5 hour AFib episode on 4/10/2025, patient is on AC (Eliquis). Rate control histogram below (> 100 bpm ~ 60% of the time).    Presenting rhythm 4/12/2025 @ 11:02 am shows Sinus Rhythm w/ occasional far-field oversensing. (False AF episodes also stored because of far-field, not a new finding).    AP 8%   0%    Battery 12.5 years remaining.    Lead measurements WNL         The patient's records and results pertinent to the visit personally reviewed by this provider.     Outside records reviewed from: Care Everywhere        Assessment    Mehran Ibarra is a 45 year old male seen as a PAC referral for risk assessment and optimization for anesthesia.    Plan/Recommendations  Pt will be optimized for the proposed procedure.  See below for details on the assessment, risk, and preoperative recommendations    NEUROLOGY  - No history of TIA, CVA or seizure  -ADD, hold Adderall on day of procedure  -Developmental delay  -Post Op delirium risk factors:  No risk identified    ENT  - No current airway concerns.  Will need to be reassessed day of surgery.  Mallampati: III  TM: > 3    CARDIAC  -Hypertrophic cardiomyopathy with extensive scar burden status post dual-chamber ICD placement 11/5/2024  -Paroxysmal atrial fibrillation on Eliquis.  Hold Eliquis 24 hours prior to colonoscopy  -Continue metoprolol  -Patient follows with outside cardiology, last seen in person for ICD placement in November, follow-up by telephone and remote device checks  -Most recent device check 4/14/2025, see above  -Today patient  "denies chest pain, SOB, JOHNSON, palpitations    - METS (Metabolic Equivalents)  Patient performs 4 or more METS exercise without symptoms             Total Score: 0      RCRI-Very low risk: Class 1 0.4% complication rate             Total Score: 0        PULMONARY    YAMILEX Low Risk             Total Score: 1    YAMILEX: Male      - Denies asthma or inhaler use    - Tobacco History    History   Smoking Status    Former    Packs/day: 0.02    Years: 10.00    Types: Cigarettes, Cigars    Quit date: 2/28/2013   Smokeless Tobacco    Never       GI  - intermittent GERD, takes TUMS  PONV Low Risk  Total Score: 1           1 AN PONV: Patient is not a current smoker        /RENAL  - Baseline Creatinine 0.72    ENDOCRINE    - BMI: Estimated body mass index is 21.99 kg/m  as calculated from the following:    Height as of this encounter: 1.702 m (5' 7\").    Weight as of this encounter: 63.7 kg (140 lb 6.4 oz).  Healthy Weight (BMI 18.5-24.9)  - No history of Diabetes Mellitus    HEME  VTE Low Risk 0.5%             Total Score: 2    VTE: Male      - Coagulopathy second to Apixaban (Eliquis)  - hold Elliquis 24 hours prior to low bleed risk colonoscopy      MSK  - thoracic scoliosis  - pectus excavatum    PSYCH  - Anxiety, continue guanfacine and Celexa      Different anesthesia methods/types have been discussed with the patient, but they are aware that the final plan will be decided by the assigned anesthesia provider on the date of service.      The patient is optimized for their procedure. AVS with information on surgery time/arrival time, meds and NPO status given by nursing staff. No further diagnostic testing indicated.        30 minutes were spent on the date of the encounter performing chart review, history and exam, documentation and/or discussion with other providers about the issues documented above.    Lyssa Alvarado PA-C  Preoperative Assessment [facility]  [facility]  Phone: " [phone]  Fax: [phone]

## 2025-07-29 ASSESSMENT — LIFESTYLE VARIABLES: TOBACCO_USE: 1

## 2025-07-29 ASSESSMENT — ENCOUNTER SYMPTOMS: DYSRHYTHMIAS: 1

## 2025-07-29 NOTE — ANESTHESIA PREPROCEDURE EVALUATION
Anesthesia Pre-Procedure Evaluation    Patient: Mehran Ibarra   MRN: [id number] : 1980          Procedure : Procedure(s):  Colonoscopy         Past Medical History:   Diagnosis Date    CARDIOVASCULAR SCREENING; LDL GOAL LESS THAN 160 2012    Elevated blood pressure reading without diagnosis of hypertension 2015    Generalized anxiety disorder 2012     Diagnosis updated by automated process. Provider to review and confirm.    GERD (gastroesophageal reflux disease) 2011    Global developmental delay     IBS (irritable bowel syndrome) 2011    With constipation     Left ventricular hypertrophy 10/01/2017    Echo , referral Dr Lopez annual followup, and prn. MR in future.    Pectus excavatum 2012    Scolioses     Undiagnosed cardiac murmurs 2017      Past Surgical History:   Procedure Laterality Date    HERNIA REPAIR, INGUINAL RT/LT      Hernia Repair, RT/LT    ingrown toenail      THORACOSCOPIC REPAIR PECTUS EXCAVATUM CHILD ([place])      tooth removal        No Known Allergies   Social History     Tobacco Use    Smoking status: Former     Current packs/day: 0.00     Average packs/day: (0.2 ttl pk-yrs)     Types: Cigarettes, Cigars     Start date: 2003     Quit date: 2013     Years since quittin.4    Smokeless tobacco: Never   Substance Use Topics    Alcohol use: No      Wt Readings from Last 1 Encounters:   25 63.7 kg (140 lb 6.4 oz)        Anesthesia Evaluation   Pt has had prior anesthetic.     No history of anesthetic complications       ROS/MED HX  ENT/Pulmonary:     (+)                tobacco use, Past use,                    (-) sleep apnea   Neurologic: Comment: ADHD  Developmental delay    (+)                         Developmental delay,       Cardiovascular: Comment: Hypertrophic cardiomyopathy    (+)  - -   -  - -   Taking blood thinners (eliquis stopped 3 days ago)                ICD Reason placed:HOCM.  type;[employer]    dysrhythmias (NSVT), a-fib,  valvular problems/murmurs type: MR mild to moderate.    Previous cardiac testing   Echo: Date: 10/1/24 Results:  Final Impressions:    1. Hypertrophic cardiomyopathy.    2. Small LV cavity size, EF of 70 - 75%.    3. Severe asymmetric septal hypertrophy.    4. Systolic anterior motion of mitral leaflet, no significant LVOT obstruction detected, peak Valsalva gradient 22 mmHg.    5. The mitral valve is normal, mild to moderate mitral regurgitation.    6. Echo contrast was administered to enhance visualization of all left ventricular segments.     Stress Test:  Date: Results:    ECG Reviewed:  Date: 10/30/24 Results:  Sinus bradycardia   Possible Left atrial enlargement   Incomplete right bundle branch block   Left anterior fascicular block   Left ventricular hypertrophy with repolarization abnormality ( R in aVL , [place]   product , Romhilt-Zarco )   Possible Anterolateral infarct , age undetermined   Abnormal ECG     Cath:  Date: Results:      METS/Exercise Tolerance: >4 METS    Hematologic:       Musculoskeletal: Comment: Scoliosis   Pectus excavatum      GI/Hepatic: Comment: IBS    (+) GERD (intermittent, uses OTC TUMS),                   Renal/Genitourinary:       Endo:       Psychiatric/Substance Use:     (+) psychiatric history anxiety       Infectious Disease:       Malignancy:       Other:              Physical Exam  Airway  Mallampati: III  TM distance: >3 FB  Neck ROM: full  Mouth opening: >= 4 cm    Cardiovascular - normal exam Comments: Hypertrophic cardiomyopathy  Dental   (+) Minor Abnormalities - some fillings, tiny chips      Pulmonary - normal exam      Neurological   Other Findings       OUTSIDE LABS:  CBC:   Lab Results   Component Value Date    WBC 8.9 07/09/2025    WBC 9.8 03/03/2025    HGB 15.6 07/09/2025    HGB 16.2 03/03/2025    HCT 45.6 07/09/2025    HCT 48.5 03/03/2025     07/09/2025     03/03/2025     BMP:   Lab Results   Component Value Date     " 07/09/2025     02/24/2025    POTASSIUM 4.4 07/09/2025    POTASSIUM 4.4 02/24/2025    CHLORIDE 105 07/09/2025    CHLORIDE 107 02/24/2025    CO2 24 07/09/2025    CO2 25 02/24/2025    BUN 13.6 07/09/2025    BUN 14.1 02/24/2025    CR 0.72 07/09/2025    CR 0.73 02/24/2025     (H) 07/09/2025    GLC 94 02/24/2025     COAGS: No results found for: \"PTT\", \"INR\", \"FIBR\"  POC: No results found for: \"BGM\", \"HCG\", \"HCGS\"  HEPATIC:   Lab Results   Component Value Date    ALBUMIN 4.7 07/09/2025    PROTTOTAL 6.8 07/09/2025    ALT 25 07/09/2025    AST 28 07/09/2025    ALKPHOS 80 07/09/2025    BILITOTAL 1.2 07/09/2025     OTHER:   Lab Results   Component Value Date    LACT 1.1 05/05/2022    A1C 5.5 07/09/2025    SHANDA 9.9 07/09/2025    LIPASE 37 03/03/2025    AMYLASE 49 03/03/2025    TSH 1.23 02/24/2025       Anesthesia Plan    ASA Status:  4      NPO Status: NPO Appropriate   Anesthesia Type: MAC.   Techniques and Equipment:       - Monitoring Plan: standard ASA monitoring     Consents    Anesthesia Plan(s) and associated risks, benefits, and realistic alternatives discussed. Questions answered and patient/representative(s) expressed understanding.     - Discussed:     - Discussed with:  Patient               Postoperative Care    Pain management: multimodal analgesia.     Comments:    Other Comments: Avoid hypotension and tachycardia.    Phenylephrine if hypotensive  Add fentanyl and avoid high dose propofol               Lakhwinder Ling MD    I have reviewed the pertinent notes and labs in the chart from the past 30 days and (re)examined the patient.  Any updates or changes from those notes are reflected in this note.    Clinically Significant Risk Factors Present on Admission                                              "

## 2025-07-30 ENCOUNTER — ANESTHESIA (OUTPATIENT)
Dept: GASTROENTEROLOGY | Facility: CLINIC | Age: 45
End: 2025-07-30
Payer: COMMERCIAL

## 2025-07-30 ENCOUNTER — HOSPITAL ENCOUNTER (OUTPATIENT)
Facility: CLINIC | Age: 45
Discharge: HOME | End: 2025-07-30
Attending: INTERNAL MEDICINE | Admitting: INTERNAL MEDICINE
Payer: COMMERCIAL

## 2025-07-30 VITALS
RESPIRATION RATE: 20 BRPM | DIASTOLIC BLOOD PRESSURE: 72 MMHG | OXYGEN SATURATION: 96 % | HEIGHT: 67 IN | SYSTOLIC BLOOD PRESSURE: 116 MMHG | BODY MASS INDEX: 21.97 KG/M2 | WEIGHT: 140 LBS | HEART RATE: 52 BPM

## 2025-07-30 LAB — COLONOSCOPY: NORMAL

## 2025-07-30 PROCEDURE — 250N000011 HC RX IP 250 OP 636: Performed by: NURSE ANESTHETIST, CERTIFIED REGISTERED

## 2025-07-30 PROCEDURE — 45385 COLONOSCOPY W/LESION REMOVAL: CPT | Performed by: INTERNAL MEDICINE

## 2025-07-30 PROCEDURE — 88305 TISSUE EXAM BY PATHOLOGIST: CPT | Mod: TC | Performed by: INTERNAL MEDICINE

## 2025-07-30 PROCEDURE — 370N000017 HC ANESTHESIA TECHNICAL FEE, PER MIN: Performed by: INTERNAL MEDICINE

## 2025-07-30 PROCEDURE — 250N000009 HC RX 250: Performed by: NURSE ANESTHETIST, CERTIFIED REGISTERED

## 2025-07-30 PROCEDURE — 999N000010 HC STATISTIC ANES STAT CODE-CRNA PER MINUTE: Performed by: INTERNAL MEDICINE

## 2025-07-30 PROCEDURE — 258N000003 HC RX IP 258 OP 636: Performed by: NURSE ANESTHETIST, CERTIFIED REGISTERED

## 2025-07-30 RX ORDER — LIDOCAINE HYDROCHLORIDE 20 MG/ML
INJECTION, SOLUTION INFILTRATION; PERINEURAL PRN
Status: DISCONTINUED | OUTPATIENT
Start: 2025-07-30 | End: 2025-07-30

## 2025-07-30 RX ORDER — SODIUM CHLORIDE, SODIUM LACTATE, POTASSIUM CHLORIDE, CALCIUM CHLORIDE 600; 310; 30; 20 MG/100ML; MG/100ML; MG/100ML; MG/100ML
INJECTION, SOLUTION INTRAVENOUS CONTINUOUS PRN
Status: DISCONTINUED | OUTPATIENT
Start: 2025-07-30 | End: 2025-07-30

## 2025-07-30 RX ORDER — PROPOFOL 10 MG/ML
INJECTION, EMULSION INTRAVENOUS PRN
Status: DISCONTINUED | OUTPATIENT
Start: 2025-07-30 | End: 2025-07-30

## 2025-07-30 RX ADMIN — SODIUM CHLORIDE, SODIUM LACTATE, POTASSIUM CHLORIDE, AND CALCIUM CHLORIDE: .6; .31; .03; .02 INJECTION, SOLUTION INTRAVENOUS at 09:44

## 2025-07-30 RX ADMIN — LIDOCAINE HYDROCHLORIDE 40 MG: 20 INJECTION, SOLUTION INFILTRATION; PERINEURAL at 09:45

## 2025-07-30 RX ADMIN — PROPOFOL 150 MCG/KG/MIN: 10 INJECTION, EMULSION INTRAVENOUS at 09:46

## 2025-07-30 RX ADMIN — PROPOFOL 80 MG: 10 INJECTION, EMULSION INTRAVENOUS at 09:45

## 2025-07-30 ASSESSMENT — ACTIVITIES OF DAILY LIVING (ADL)
ADLS_ACUITY_SCORE: 41
ADLS_ACUITY_SCORE: 41

## 2025-07-30 NOTE — ANESTHESIA POSTPROCEDURE EVALUATION
Patient: Mehran Ibarra    Procedure: Procedure(s):  Colonoscopy       Anesthesia Type:  MAC    Note:     Postop Pain Control: Uneventful            Sign Out: Well controlled pain   PONV: No   Neuro/Psych: Uneventful            Sign Out: Acceptable/Baseline neuro status   Airway/Respiratory: Uneventful            Sign Out: Acceptable/Baseline resp. status   CV/Hemodynamics: Uneventful            Sign Out: Acceptable CV status; No obvious hypovolemia; No obvious fluid overload   Other NRE: NONE   DID A NON-ROUTINE EVENT OCCUR? No           Last vitals:  Vitals Value Taken Time   /72 07/30/25 10:30   Temp     Pulse 59 07/30/25 10:38   Resp 13 07/30/25 10:38   SpO2 97 % 07/30/25 10:38   Vitals shown include unfiled device data.    Electronically Signed By: Lakhwinder Ling MD  July 30, 2025  11:41 AM

## 2025-07-30 NOTE — ANESTHESIA CARE TRANSFER NOTE
Patient: Mehran Ibarra    Procedure: Procedure(s):  Colonoscopy       Diagnosis: Screen for colon cancer [Z12.11]  Diagnosis Additional Information: No value filed.    Anesthesia Type:   MAC     Note:    Oropharynx: oropharynx clear of all foreign objects  Level of Consciousness: drowsy  Oxygen Supplementation: room air    Independent Airway: airway patency satisfactory and stable  Dentition: dentition unchanged  Vital Signs Stable: post-procedure vital signs reviewed and stable  Report to RN Given: handoff report given  Patient transferred to: Phase II    Handoff Report: Identifed the Patient, Identified the Reponsible Provider, Reviewed the pertinent medical history, Discussed the surgical course, Reviewed Intra-OP anesthesia mangement and issues during anesthesia, Set expectations for post-procedure period and Allowed opportunity for questions and acknowledgement of understanding      Vitals:  Vitals Value Taken Time   /69 07/30/25 10:08   Temp 36.6 C    Pulse 55 07/30/25 10:09   Resp 20 07/30/25 10:09   SpO2 97 % 07/30/25 10:09   Vitals shown include unfiled device data.    Electronically Signed By: SHIRA Daugherty CRNA  July 30, 2025  10:10 AM

## 2025-07-30 NOTE — H&P
[facility]  Pre-Endoscopy History and Physical     Mehran Ibarra MRN# [id number]   YOB: 1980 Age: 45 year old     Date of Procedure: 7/30/2025  Primary care provider: Roberto Jennings  Type of Endoscopy: colonoscopy  Reason for Procedure: screening  Type of Anesthesia Anticipated: MAC    HPI:    Mehran is a 45 year old male who will be undergoing the above procedure.      A history and physical has been performed. The patient's medications and allergies have been reviewed. The risks and benefits of the procedure and the sedation options and risks were discussed with the patient.  All questions were answered and informed consent was obtained.      He denies a personal or family history of anesthesia complications or bleeding disorders.     No Known Allergies     Prior to Admission Medications   Prescriptions Last Dose Informant Patient Reported? Taking?   Cholecalciferol (D 1000) 25 MCG (1000 UT) CAPS 7/29/2025  Yes Yes   Sig: Take 1,000 Units by mouth daily.   amphetamine-dextroamphetamine (ADDERALL XR) 5 MG 24 hr capsule 7/29/2025  No Yes   Sig: TAKE 1 CAPSULE (5 MG) BY MOUTH 2 TIMES DAILY.   Patient taking differently: Take 5 mg by mouth 2 times daily as needed.   amphetamine-dextroamphetamine (ADDERALL XR) 5 MG 24 hr capsule 7/29/2025  No Yes   Sig: Take 1 capsule (5 mg) by mouth 2 times daily.   Patient taking differently: Take 5 mg by mouth 2 times daily as needed.   amphetamine-dextroamphetamine (ADDERALL XR) 5 MG 24 hr capsule 7/29/2025  No Yes   Sig: Take 1 capsule (5 mg) by mouth 2 times daily.   Patient taking differently: Take 5 mg by mouth 2 times daily as needed.   amphetamine-dextroamphetamine (ADDERALL XR) 5 MG 24 hr capsule 7/29/2025  No Yes   Sig: Take 1 capsule (5 mg) by mouth 2 times daily.   Patient taking differently: Take 5 mg by mouth 2 times daily as needed.   apixaban ANTICOAGULANT (ELIQUIS) 5 MG tablet 7/27/2025  No No   Sig: Take 1 tablet (5 mg)  by mouth 2 times daily.   calcium carbonate (TUMS) 500 MG chewable tablet 7/29/2025  Yes Yes   Sig: Take 1 chew tab by mouth as needed for heartburn.   citalopram (CELEXA) 10 MG tablet 7/29/2025  No Yes   Sig: Take 1 tablet (10 mg) by mouth daily.   Patient taking differently: Take 10 mg by mouth every morning.   guanFACINE (TENEX) 1 MG tablet 7/29/2025  No Yes   Sig: Take 1 tablet (1 mg) by mouth at bedtime.   methylcellulose (CITRUCEL) 500 MG TABS tablet Unknown  Yes No   Sig: Take 500 mg by mouth as needed.   metoprolol tartrate (LOPRESSOR) 25 MG tablet 7/29/2025  No Yes   Sig: Take 1 tablet (25 mg) by mouth 2 times daily.      Facility-Administered Medications: None       Patient Active Problem List   Diagnosis    GERD (gastroesophageal reflux disease)    IBS (irritable bowel syndrome)    Pectus excavatum    Attention deficit disorder    Generalized anxiety disorder    CARDIOVASCULAR SCREENING; LDL GOAL LESS THAN 130    Global developmental delay    Atypical nevus of back    Bunion of great toe of left foot    Scolioses    Impulse control disorder    Intellectual disability    Tobacco dependence in remission    Lung nodule, solitary    Hypertrophic cardiomyopathy (H)    Paroxysmal atrial fibrillation (H)    History of community acquired pneumonia    Nonrheumatic mitral valve regurgitation    Unintentional weight loss        Past Medical History:   Diagnosis Date    CARDIOVASCULAR SCREENING; LDL GOAL LESS THAN 160 06/09/2012    Elevated blood pressure reading without diagnosis of hypertension 08/21/2015    Generalized anxiety disorder 06/08/2012     Diagnosis updated by automated process. Provider to review and confirm.    GERD (gastroesophageal reflux disease) 03/08/2011    Global developmental delay     IBS (irritable bowel syndrome) 03/08/2011    With constipation     Left ventricular hypertrophy 10/01/2017    Echo 9/17, referral Dr Lopez annual followup, and prn. MR in future.    Pectus excavatum 06/08/2012     "Scolioses     Undiagnosed cardiac murmurs 2017        Past Surgical History:   Procedure Laterality Date    HERNIA REPAIR, INGUINAL RT/LT      Hernia Repair, RT/LT    ingrown toenail      THORACOSCOPIC REPAIR PECTUS EXCAVATUM CHILD ([place])      tooth removal         Social History     Tobacco Use    Smoking status: Former     Current packs/day: 0.00     Average packs/day: (0.2 ttl pk-yrs)     Types: Cigarettes, Cigars     Start date: 2003     Quit date: 2013     Years since quittin.4    Smokeless tobacco: Never   Substance Use Topics    Alcohol use: No       Family History   Problem Relation Age of Onset    Cardiovascular Mother     Hypertrophic cardiomyopathy Mother     Hypertension Father     Colitis Father     Alopecia Father     Hypertension Maternal Grandmother     Heart Failure Maternal Grandmother     Heart Disease Maternal Grandmother     Diverticulitis Maternal Grandmother     Diabetes Paternal Grandfather     Anesthesia Reaction No family hx of     Venous thrombosis No family hx of        REVIEW OF SYSTEMS:     5 point ROS negative except as noted above in HPI, including Gen., Resp., CV, GI &  system review.      PHYSICAL EXAM:   BP (!) 121/98   Resp 16   Ht 1.702 m (5' 7\")   Wt 63.5 kg (140 lb)   SpO2 98%   BMI 21.93 kg/m   Estimated body mass index is 21.93 kg/m  as calculated from the following:    Height as of this encounter: 1.702 m (5' 7\").    Weight as of this encounter: 63.5 kg (140 lb).   GENERAL APPEARANCE: healthy, alert, and no distress  MENTAL STATUS: alert  AIRWAY EXAM: Mallampatti per anesthesia\  RESP: lungs clear to auscultation - no rales, rhonchi or wheezes  CV: regular rates and rhythm      DIAGNOSTICS:    Not indicated      IMPRESSION   ASA Class per anesthesia          PLAN:       Plan for colonoscopy. We discussed the risks, benefits and alternatives and the patient wished to proceed.    The above has been forwarded to the consulting provider.      Signed " Electronically by: Jayson Oscar MD  July 30, 2025    Jayson Oscar MD  [facility]  Cell: [phone]  Office Phone: [phone]  Office Fax: [phone]

## 2025-07-31 LAB
PATH REPORT.COMMENTS IMP SPEC: NORMAL
PATH REPORT.COMMENTS IMP SPEC: NORMAL
PATH REPORT.FINAL DX SPEC: NORMAL
PATH REPORT.GROSS SPEC: NORMAL
PATH REPORT.MICROSCOPIC SPEC OTHER STN: NORMAL
PATH REPORT.RELEVANT HX SPEC: NORMAL
PHOTO IMAGE: NORMAL

## 2025-07-31 PROCEDURE — 88305 TISSUE EXAM BY PATHOLOGIST: CPT | Mod: 26 | Performed by: PATHOLOGY

## 2025-08-13 PROBLEM — D12.6 TUBULAR ADENOMA OF COLON: Status: ACTIVE | Noted: 2025-08-13

## 2025-08-14 ENCOUNTER — PATIENT OUTREACH (OUTPATIENT)
Dept: GASTROENTEROLOGY | Facility: CLINIC | Age: 45
End: 2025-08-14

## 2025-08-14 ENCOUNTER — VIRTUAL VISIT (OUTPATIENT)
Dept: FAMILY MEDICINE | Facility: CLINIC | Age: 45
End: 2025-08-14
Payer: COMMERCIAL

## 2025-08-14 DIAGNOSIS — Z11.59 NEED FOR HEPATITIS C SCREENING TEST: ICD-10-CM

## 2025-08-14 DIAGNOSIS — Z13.220 SCREENING CHOLESTEROL LEVEL: ICD-10-CM

## 2025-08-14 DIAGNOSIS — Z28.39 INCOMPLETE IMMUNIZATION SERIES: ICD-10-CM

## 2025-08-14 DIAGNOSIS — I42.2 HYPERTROPHIC CARDIOMYOPATHY (H): ICD-10-CM

## 2025-08-14 DIAGNOSIS — K62.5 RECTAL BLEEDING: Primary | ICD-10-CM

## 2025-08-14 PROCEDURE — 98005 SYNCH AUDIO-VIDEO EST LOW 20: CPT | Performed by: NURSE PRACTITIONER

## 2025-08-14 PROCEDURE — 3049F LDL-C 100-129 MG/DL: CPT | Mod: 95 | Performed by: NURSE PRACTITIONER

## 2025-08-14 RX ORDER — OMEGA-3S/DHA/EPA/FISH OIL 1000-1400
3 CAPSULE,DELAYED RELEASE (ENTERIC COATED) ORAL DAILY
COMMUNITY
Start: 2025-08-14

## 2025-08-18 ENCOUNTER — LAB (OUTPATIENT)
Dept: LAB | Facility: CLINIC | Age: 45
End: 2025-08-18
Payer: COMMERCIAL

## 2025-08-18 DIAGNOSIS — K62.5 RECTAL BLEEDING: ICD-10-CM

## 2025-08-18 DIAGNOSIS — Z11.59 NEED FOR HEPATITIS C SCREENING TEST: ICD-10-CM

## 2025-08-18 DIAGNOSIS — Z13.220 SCREENING CHOLESTEROL LEVEL: ICD-10-CM

## 2025-08-18 LAB
CHOLEST SERPL-MCNC: 195 MG/DL
FASTING STATUS PATIENT QL REPORTED: ABNORMAL
HCV AB SERPL QL IA: NONREACTIVE
HDLC SERPL-MCNC: 47 MG/DL
HGB BLD-MCNC: 13.1 G/DL (ref 13.3–17.7)
LDLC SERPL CALC-MCNC: 108 MG/DL
MCV RBC AUTO: 89.2 FL (ref 78–100)
NONHDLC SERPL-MCNC: 148 MG/DL
TRIGL SERPL-MCNC: 202 MG/DL

## 2025-08-18 PROCEDURE — 86803 HEPATITIS C AB TEST: CPT

## 2025-08-18 PROCEDURE — 80061 LIPID PANEL: CPT

## 2025-08-18 PROCEDURE — 36415 COLL VENOUS BLD VENIPUNCTURE: CPT

## 2025-08-18 PROCEDURE — 85018 HEMOGLOBIN: CPT

## 2025-08-19 ENCOUNTER — MYC MEDICAL ADVICE (OUTPATIENT)
Dept: FAMILY MEDICINE | Facility: CLINIC | Age: 45
End: 2025-08-19
Payer: COMMERCIAL